# Patient Record
Sex: MALE | Race: WHITE | Employment: OTHER | URBAN - NONMETROPOLITAN AREA
[De-identification: names, ages, dates, MRNs, and addresses within clinical notes are randomized per-mention and may not be internally consistent; named-entity substitution may affect disease eponyms.]

---

## 2018-01-28 ENCOUNTER — NURSE TRIAGE (OUTPATIENT)
Dept: ADMINISTRATIVE | Age: 83
End: 2018-01-28

## 2018-01-28 NOTE — TELEPHONE ENCOUNTER
Reason for Disposition   [1] Fever AND [2] no signs of serious infection or localizing symptoms (all other triage questions negative)     Taking antibiotics for UTI. Answer Assessment - Initial Assessment Questions  1. TEMPERATURE: \"What is the most recent temperature? \"  \"How was it measured? \"       100.0  2. ONSET: \"When did the fever start? \"       Today   3. SYMPTOMS: \"Do you have any other symptoms besides the fever? \"  (e.g., colds, headache, sore throat, earache, cough, rash, diarrhea, vomiting, abdominal pain)      Cough and Dx. With UTI. 4. CAUSE: If there are no symptoms, ask: \"What do you think is causing the fever? \"      UTI   5. CONTACTS: \"Does anyone else in the family have an infection? \"      No   6. TREATMENT: \"What have you done so far to treat this fever? \" (e.g., medications)      Rx. Cephalexin. 7. IMMUNOCOMPROMISE: \"Do you have of the following: diabetes, HIV positive, splenectomy, cancer chemotherapy, chronic steroid treatment, transplant patient, etc.\"      No.   8. PREGNANCY: \"Is there any chance you are pregnant? \" \"When was your last menstrual period? \"      Male   9. TRAVEL: \"Have you traveled out of the country in the last month? \" (e.g., travel history, exposures)      No.    Protocols used:  [facility]

## 2019-05-29 ENCOUNTER — HOSPITAL ENCOUNTER (INPATIENT)
Age: 84
LOS: 9 days | Discharge: HOME | DRG: 472 | End: 2019-06-07
Attending: HOSPITALIST | Admitting: HOSPITALIST
Payer: MEDICARE

## 2019-05-29 DIAGNOSIS — Z00.6 ENCOUNTER FOR EXAMINATION FOR NORMAL COMPARISON AND CONTROL IN CLINICAL RESEARCH PROGRAM: ICD-10-CM

## 2019-05-29 PROBLEM — M46.22 ACUTE OSTEOMYELITIS OF CERVICAL SPINE (HCC): Status: ACTIVE | Noted: 2019-05-29

## 2019-05-29 LAB
ALBUMIN SERPL-MCNC: 3.4 G/DL (ref 3.5–5.1)
ALP BLD-CCNC: 84 U/L (ref 38–126)
ALT SERPL-CCNC: 31 U/L (ref 11–66)
ANION GAP SERPL CALCULATED.3IONS-SCNC: 12 MEQ/L (ref 8–16)
AST SERPL-CCNC: 24 U/L (ref 5–40)
BASOPHILS # BLD: 0.3 %
BASOPHILS ABSOLUTE: 0 THOU/MM3 (ref 0–0.1)
BILIRUB SERPL-MCNC: 0.4 MG/DL (ref 0.3–1.2)
BUN BLDV-MCNC: 24 MG/DL (ref 7–22)
C-REACTIVE PROTEIN: 0.69 MG/DL (ref 0–1)
C-REACTIVE PROTEIN: 0.79 MG/DL (ref 0–1)
CALCIUM SERPL-MCNC: 8.7 MG/DL (ref 8.5–10.5)
CHLORIDE BLD-SCNC: 104 MEQ/L (ref 98–111)
CO2: 23 MEQ/L (ref 23–33)
CREAT SERPL-MCNC: 1.4 MG/DL (ref 0.4–1.2)
EOSINOPHIL # BLD: 1.5 %
EOSINOPHILS ABSOLUTE: 0.2 THOU/MM3 (ref 0–0.4)
ERYTHROCYTE [DISTWIDTH] IN BLOOD BY AUTOMATED COUNT: 12.9 % (ref 11.5–14.5)
ERYTHROCYTE [DISTWIDTH] IN BLOOD BY AUTOMATED COUNT: 46.9 FL (ref 35–45)
GFR SERPL CREATININE-BSD FRML MDRD: 48 ML/MIN/1.73M2
GLUCOSE BLD-MCNC: 157 MG/DL (ref 70–108)
HCT VFR BLD CALC: 40 % (ref 42–52)
HEMOGLOBIN: 13.1 GM/DL (ref 14–18)
IMMATURE GRANS (ABS): 0.07 THOU/MM3 (ref 0–0.07)
IMMATURE GRANULOCYTES: 0.6 %
LYMPHOCYTES # BLD: 19.5 %
LYMPHOCYTES ABSOLUTE: 2.4 THOU/MM3 (ref 1–4.8)
MCH RBC QN AUTO: 32.8 PG (ref 26–33)
MCHC RBC AUTO-ENTMCNC: 32.8 GM/DL (ref 32.2–35.5)
MCV RBC AUTO: 100 FL (ref 80–94)
MONOCYTES # BLD: 9.3 %
MONOCYTES ABSOLUTE: 1.2 THOU/MM3 (ref 0.4–1.3)
NUCLEATED RED BLOOD CELLS: 0 /100 WBC
PLATELET # BLD: 212 THOU/MM3 (ref 130–400)
PMV BLD AUTO: 9.2 FL (ref 9.4–12.4)
POTASSIUM REFLEX MAGNESIUM: 4.9 MEQ/L (ref 3.5–5.2)
RBC # BLD: 4 MILL/MM3 (ref 4.7–6.1)
SEDIMENTATION RATE, ERYTHROCYTE: 27 MM/HR (ref 0–10)
SEG NEUTROPHILS: 68.8 %
SEGMENTED NEUTROPHILS ABSOLUTE COUNT: 8.5 THOU/MM3 (ref 1.8–7.7)
SODIUM BLD-SCNC: 139 MEQ/L (ref 135–145)
TOTAL PROTEIN: 6.6 G/DL (ref 6.1–8)
WBC # BLD: 12.4 THOU/MM3 (ref 4.8–10.8)

## 2019-05-29 PROCEDURE — 36415 COLL VENOUS BLD VENIPUNCTURE: CPT

## 2019-05-29 PROCEDURE — 1200000000 HC SEMI PRIVATE

## 2019-05-29 PROCEDURE — 86140 C-REACTIVE PROTEIN: CPT

## 2019-05-29 PROCEDURE — 85651 RBC SED RATE NONAUTOMATED: CPT

## 2019-05-29 PROCEDURE — 6370000000 HC RX 637 (ALT 250 FOR IP): Performed by: HOSPITALIST

## 2019-05-29 PROCEDURE — 80053 COMPREHEN METABOLIC PANEL: CPT

## 2019-05-29 PROCEDURE — 85025 COMPLETE CBC W/AUTO DIFF WBC: CPT

## 2019-05-29 PROCEDURE — 99222 1ST HOSP IP/OBS MODERATE 55: CPT | Performed by: NEUROLOGICAL SURGERY

## 2019-05-29 PROCEDURE — 6360000002 HC RX W HCPCS: Performed by: HOSPITALIST

## 2019-05-29 PROCEDURE — 99223 1ST HOSP IP/OBS HIGH 75: CPT | Performed by: HOSPITALIST

## 2019-05-29 PROCEDURE — 2580000003 HC RX 258: Performed by: HOSPITALIST

## 2019-05-29 PROCEDURE — 2709999900 HC NON-CHARGEABLE SUPPLY

## 2019-05-29 PROCEDURE — 87040 BLOOD CULTURE FOR BACTERIA: CPT

## 2019-05-29 RX ORDER — PANTOPRAZOLE SODIUM 40 MG/1
40 TABLET, DELAYED RELEASE ORAL
Status: DISCONTINUED | OUTPATIENT
Start: 2019-05-30 | End: 2019-06-07 | Stop reason: HOSPADM

## 2019-05-29 RX ORDER — LEVOTHYROXINE SODIUM 0.12 MG/1
125 TABLET ORAL DAILY
Status: DISCONTINUED | OUTPATIENT
Start: 2019-05-30 | End: 2019-06-07 | Stop reason: HOSPADM

## 2019-05-29 RX ORDER — TRIMETHOPRIM 100 MG/1
100 TABLET ORAL DAILY
COMMUNITY

## 2019-05-29 RX ORDER — ISOSORBIDE MONONITRATE 60 MG/1
60 TABLET, EXTENDED RELEASE ORAL DAILY
Status: DISCONTINUED | OUTPATIENT
Start: 2019-05-30 | End: 2019-06-07 | Stop reason: HOSPADM

## 2019-05-29 RX ORDER — SODIUM CHLORIDE 0.9 % (FLUSH) 0.9 %
10 SYRINGE (ML) INJECTION PRN
Status: DISCONTINUED | OUTPATIENT
Start: 2019-05-29 | End: 2019-06-05

## 2019-05-29 RX ORDER — LEVOTHYROXINE SODIUM 0.12 MG/1
125 TABLET ORAL DAILY
COMMUNITY

## 2019-05-29 RX ORDER — ACETAMINOPHEN 325 MG/1
650 TABLET ORAL EVERY 6 HOURS PRN
Status: DISCONTINUED | OUTPATIENT
Start: 2019-05-29 | End: 2019-06-05

## 2019-05-29 RX ORDER — SODIUM CHLORIDE 9 MG/ML
INJECTION, SOLUTION INTRAVENOUS CONTINUOUS
Status: DISCONTINUED | OUTPATIENT
Start: 2019-05-29 | End: 2019-06-03

## 2019-05-29 RX ORDER — AMLODIPINE BESYLATE 5 MG/1
5 TABLET ORAL 2 TIMES DAILY
Status: DISCONTINUED | OUTPATIENT
Start: 2019-05-29 | End: 2019-06-07 | Stop reason: HOSPADM

## 2019-05-29 RX ORDER — ATORVASTATIN CALCIUM 40 MG/1
40 TABLET, FILM COATED ORAL NIGHTLY
Status: DISCONTINUED | OUTPATIENT
Start: 2019-05-29 | End: 2019-06-07 | Stop reason: HOSPADM

## 2019-05-29 RX ORDER — SENNA PLUS 8.6 MG/1
1 TABLET ORAL DAILY PRN
Status: DISCONTINUED | OUTPATIENT
Start: 2019-05-29 | End: 2019-06-05

## 2019-05-29 RX ORDER — CLOPIDOGREL BISULFATE 75 MG/1
75 TABLET ORAL DAILY
Status: DISCONTINUED | OUTPATIENT
Start: 2019-05-30 | End: 2019-05-29

## 2019-05-29 RX ORDER — NITROGLYCERIN 0.4 MG/1
0.4 TABLET SUBLINGUAL EVERY 5 MIN PRN
Status: DISCONTINUED | OUTPATIENT
Start: 2019-05-29 | End: 2019-06-07 | Stop reason: HOSPADM

## 2019-05-29 RX ORDER — ATORVASTATIN CALCIUM 40 MG/1
40 TABLET, FILM COATED ORAL NIGHTLY
COMMUNITY

## 2019-05-29 RX ORDER — POTASSIUM CHLORIDE 7.45 MG/ML
10 INJECTION INTRAVENOUS PRN
Status: DISCONTINUED | OUTPATIENT
Start: 2019-05-29 | End: 2019-06-05

## 2019-05-29 RX ORDER — ASPIRIN 81 MG/1
81 TABLET ORAL DAILY
Status: DISCONTINUED | OUTPATIENT
Start: 2019-05-30 | End: 2019-06-07 | Stop reason: HOSPADM

## 2019-05-29 RX ORDER — TRIMETHOPRIM 100 MG/1
100 TABLET ORAL DAILY
Status: DISCONTINUED | OUTPATIENT
Start: 2019-05-30 | End: 2019-06-07 | Stop reason: HOSPADM

## 2019-05-29 RX ORDER — ONDANSETRON 2 MG/ML
4 INJECTION INTRAMUSCULAR; INTRAVENOUS EVERY 6 HOURS PRN
Status: DISCONTINUED | OUTPATIENT
Start: 2019-05-29 | End: 2019-06-05

## 2019-05-29 RX ORDER — PANTOPRAZOLE SODIUM 40 MG/1
40 GRANULE, DELAYED RELEASE ORAL
COMMUNITY

## 2019-05-29 RX ORDER — RANOLAZINE 500 MG/1
500 TABLET, EXTENDED RELEASE ORAL 2 TIMES DAILY
COMMUNITY

## 2019-05-29 RX ORDER — DOCUSATE SODIUM 100 MG/1
100 CAPSULE, LIQUID FILLED ORAL 2 TIMES DAILY PRN
Status: DISCONTINUED | OUTPATIENT
Start: 2019-05-29 | End: 2019-06-07 | Stop reason: HOSPADM

## 2019-05-29 RX ORDER — SODIUM CHLORIDE 0.9 % (FLUSH) 0.9 %
10 SYRINGE (ML) INJECTION EVERY 12 HOURS SCHEDULED
Status: DISCONTINUED | OUTPATIENT
Start: 2019-05-29 | End: 2019-06-05

## 2019-05-29 RX ORDER — ISOSORBIDE MONONITRATE 60 MG/1
60 TABLET, EXTENDED RELEASE ORAL DAILY
COMMUNITY

## 2019-05-29 RX ORDER — POTASSIUM CHLORIDE 20 MEQ/1
40 TABLET, EXTENDED RELEASE ORAL PRN
Status: DISCONTINUED | OUTPATIENT
Start: 2019-05-29 | End: 2019-06-05

## 2019-05-29 RX ORDER — RANOLAZINE 500 MG/1
500 TABLET, EXTENDED RELEASE ORAL 2 TIMES DAILY
Status: DISCONTINUED | OUTPATIENT
Start: 2019-05-29 | End: 2019-06-07 | Stop reason: HOSPADM

## 2019-05-29 RX ADMIN — ATORVASTATIN CALCIUM 40 MG: 40 TABLET, FILM COATED ORAL at 20:30

## 2019-05-29 RX ADMIN — CEFTRIAXONE SODIUM 1 G: 1 INJECTION, POWDER, FOR SOLUTION INTRAMUSCULAR; INTRAVENOUS at 12:41

## 2019-05-29 RX ADMIN — DOCUSATE SODIUM 100 MG: 100 CAPSULE, LIQUID FILLED ORAL at 20:39

## 2019-05-29 RX ADMIN — METOPROLOL TARTRATE 25 MG: 25 TABLET ORAL at 20:30

## 2019-05-29 RX ADMIN — SODIUM CHLORIDE: 9 INJECTION, SOLUTION INTRAVENOUS at 12:27

## 2019-05-29 RX ADMIN — ACETAMINOPHEN 650 MG: 325 TABLET ORAL at 12:24

## 2019-05-29 RX ADMIN — ENOXAPARIN SODIUM 40 MG: 40 INJECTION SUBCUTANEOUS at 20:32

## 2019-05-29 RX ADMIN — AMLODIPINE BESYLATE 5 MG: 5 TABLET ORAL at 20:30

## 2019-05-29 RX ADMIN — RANOLAZINE 500 MG: 500 TABLET, FILM COATED, EXTENDED RELEASE ORAL at 20:30

## 2019-05-29 RX ADMIN — ACETAMINOPHEN 650 MG: 325 TABLET ORAL at 17:43

## 2019-05-29 ASSESSMENT — PAIN DESCRIPTION - ORIENTATION: ORIENTATION: INNER;MID;POSTERIOR

## 2019-05-29 ASSESSMENT — PAIN DESCRIPTION - FREQUENCY: FREQUENCY: INTERMITTENT

## 2019-05-29 ASSESSMENT — PAIN DESCRIPTION - ONSET: ONSET: ON-GOING

## 2019-05-29 ASSESSMENT — PAIN DESCRIPTION - LOCATION: LOCATION: BACK;NECK

## 2019-05-29 ASSESSMENT — PAIN DESCRIPTION - DESCRIPTORS: DESCRIPTORS: SHOOTING;SHARP

## 2019-05-29 ASSESSMENT — PAIN SCALES - GENERAL
PAINLEVEL_OUTOF10: 4
PAINLEVEL_OUTOF10: 5
PAINLEVEL_OUTOF10: 5
PAINLEVEL_OUTOF10: 4

## 2019-05-29 ASSESSMENT — PAIN DESCRIPTION - PAIN TYPE: TYPE: ACUTE PAIN

## 2019-05-29 NOTE — H&P
Yes Historical Provider, MD   isosorbide mononitrate (IMDUR) 60 MG extended release tablet Take 60 mg by mouth daily   Yes Historical Provider, MD   trimethoprim (TRIMPEX) 100 MG tablet Take 100 mg by mouth daily   Yes Historical Provider, MD   atorvastatin (LIPITOR) 40 MG tablet Take 40 mg by mouth nightly   Yes Historical Provider, MD   ranolazine (RANEXA) 500 MG extended release tablet Take 500 mg by mouth 2 times daily   Yes Historical Provider, MD   pantoprazole sodium (PROTONIX) 40 MG PACK packet Take 40 mg by mouth every morning (before breakfast)   Yes Historical Provider, MD   clopidogrel (PLAVIX) 75 MG tablet Take 75 mg by mouth daily. Yes Historical Provider, MD   metoprolol (LOPRESSOR) 50 MG tablet Take 25 mg by mouth 2 times daily. Yes Historical Provider, MD   aspirin EC 81 MG EC tablet Take 81 mg by mouth daily. Yes Historical Provider, MD   amlodipine (NORVASC) 10 MG tablet Take 5 mg by mouth 2 times daily. Yes Historical Provider, MD   acetaminophen (TYLENOL) 500 MG tablet Take 500 mg by mouth every 6 hours as needed. Yes Historical Provider, MD   docusate sodium (COLACE) 100 MG capsule Take 100 mg by mouth 2 times daily as needed    Yes Historical Provider, MD   nitroGLYCERIN (NITROSTAT) 0.4 MG SL tablet Place 0.4 mg under the tongue every 5 minutes as needed. Historical Provider, MD       Allergies:  Lopid [gemfibrozil]; Zocor [simvastatin]; Flomax [tamsulosin hcl]; Levaquin [levofloxacin]; and Amoxicillin-pot clavulanate    Social History:      The patient currently lives at home    TOBACCO:   reports that he has quit smoking. He quit smokeless tobacco use about 52 years ago. ETOH:   reports that he drinks alcohol.       Family History:      Reviewed in detail and Positive as follows:        Problem Relation Age of Onset    Diabetes Mother     Stroke Mother     Heart Disease Father        Diet:  DIET GENERAL; No Added Salt (3-4 GM)    REVIEW OF SYSTEMS:   Pertinent positives as noted in the HPI. All other systems reviewed and negative. PHYSICAL EXAM:    /62   Pulse 66   Temp 97.8 °F (36.6 °C) (Oral)   Resp 16   Ht 5' 8\" (1.727 m)   Wt 179 lb 2 oz (81.3 kg)   SpO2 94%   BMI 27.24 kg/m²     General appearance:  No apparent distress, appears stated age and cooperative. HEENT:  Normal cephalic, atraumatic without obvious deformity. Pupils equal, round, and reactive to light. Extra ocular muscles intact. Conjunctivae/corneas clear. Neck: No jugular venous distention. Trachea midline. Respiratory:  Normal respiratory effort. Clear to auscultation, bilaterally without Rales/Wheezes/Rhonchi. Cardiovascular:  Regular rate and rhythm with normal S1/S2 without murmurs, rubs or gallops. Abdomen: Soft, non-tender, non-distended with normal bowel sounds. Musculoskeletal:  No clubbing, cyanosis or edema bilaterally. Full range of motion without deformity. Skin: Skin color, texture, turgor normal.  No rashes or lesions. Neurologic:  Neurovascularly intact without any focal sensory/motor deficits. Cranial nerves: II-XII intact, grossly non-focal.  Psychiatric:  Alert and oriented, thought content appropriate, normal insight  Capillary Refill: Brisk,< 3 seconds   Peripheral Pulses: +2 palpable, equal bilaterally       Labs:     Recent Labs     05/29/19  1154   WBC 12.4*   HGB 13.1*   HCT 40.0*        Recent Labs     05/29/19  1154      K 4.9      CO2 23   BUN 24*   CREATININE 1.4*   CALCIUM 8.7     Recent Labs     05/29/19  1154   AST 24   ALT 31   BILITOT 0.4   ALKPHOS 84     No results for input(s): INR in the last 72 hours. No results for input(s): [place] Audrey in the last 72 hours.     Urinalysis:      Lab Results   Component Value Date    NITRU NEGATIVE 11/22/2011    WBCUA 15-25 11/22/2011    BACTERIA NONE 11/22/2011    RBCUA 0-2 11/22/2011    BLOODU NEGATIVE 11/22/2011    GLUCOSEU NEGATIVE 11/22/2011       Intake & Output:  No intake/output data recorded. No intake/output data recorded. Radiology:       No orders to display        DVT prophylaxis: Lovenox    Code Status: Full Code      Disposition:Home    Active Hospital Problems    Diagnosis Date Noted    [facility] [M46.22] 05/29/2019     Priority: High    Hyperlipidemia [E78.5]     Hypertension [I10]     CAD (coronary artery disease) [I25.10]     Personal history of DVT (deep vein thrombosis) [Z86.718]        PLAN:    1. Acute osteomyelitis of cervical spine - On Vancomycin and Rocephin empirically. Consult ID. Blood culture and CRP ordered. MRI report not available at this time. 2. HTN, HLD, CAD - stable. Continue home medications. Thank you Sophai Hassan for the opportunity to be involved in this patient's care.     Electronically signed by Frankey Fee, DO on 5/29/2019 at 1:47 PM

## 2019-05-29 NOTE — PROGRESS NOTES
Pharmacy Note  Vancomycin Consult    Fer Payment is a 81 y.o. male started on Vancomycin for osteomyelitis of cervical spine; consult received from Dr. Nehal Martinez to manage therapy. Also receiving the following antibiotics: ceftriaxone. Patient Active Problem List   Diagnosis    CAD (coronary artery disease)    Personal history of DVT (deep vein thrombosis)    Hyperlipidemia    Hypertension    Stented coronary artery, last 2004    Transverse myelitis, 1997    Pulmonary embolism (HCC)    Acute osteomyelitis of cervical spine (HCC)       Allergies:  Lopid [gemfibrozil]; Zocor [simvastatin]; Flomax [tamsulosin hcl]; Levaquin [levofloxacin]; and Amoxicillin-pot clavulanate     Temp max: 97.8    Recent Labs     05/29/19  1154   BUN 24*       Recent Labs     05/29/19  1154   CREATININE 1.4*       Recent Labs     05/29/19  1154   WBC 12.4*       No intake or output data in the 24 hours ending 05/29/19 1244    Culture Date Source Results   none                            Ht Readings from Last 1 Encounters:   05/29/19 5' 8\" (1.727 m)        Wt Readings from Last 1 Encounters:   05/29/19 179 lb 2 oz (81.3 kg)         Body mass index is 27.24 kg/m². Estimated Creatinine Clearance: 39 mL/min (A) (based on SCr of 1.4 mg/dL (H)). Goal Trough Level: 15 to 20 mcg/mL    Assessment/Plan:  Will initiate vancomycin 1250mg IV every 24 hours. Timing of trough level will be determined based on culture results, renal function, and clinical response. Thank you for the consult. Will continue to follow.      Tory Alvarado, PharmD, BCPS 5/29/2019 12:49 PM

## 2019-05-29 NOTE — CONSULTS
[address] [id number]                                  CONSULTATION    PATIENT NAME: Amaya Sheridan                      :        1935  MED REC NO:   [id number]                           ROOM:       0014  ACCOUNT NO:   [de-identified]                           ADMIT DATE: 2019  PROVIDER:     Jammie Tenorio. Tanner Hoffman M.D.    Barbsara Yen:  2019    HISTORY OF PRESENT ILLNESS:  This is an 81-year-old male patient who was  admitted to the hospital due to neck pain for the last three weeks. The  patient had a previous history of neck surgery 40 years ago, and for the  last three weeks, he has been having neck pain, radiating to his upper  extremities. This has been going on for the last three weeks. The  patient had an MRI of his neck yesterday and there was a finding  suspicious for like discitis, for which reason, he was advised to come  to the hospital.    His MRI done on 2019 reported that there is abnormal edema within  the paraspinal soft tissue and prevertebral soft tissue, just ventral to  the C6 and C7 vertebral bodies on the T1 sequence. He has degenerative  disk disease with loss of disk space, abnormal edema identified on the  endplate of C6 to C7. There was a question, small amount of epidural  phlegmon versus prominence of the posterior longitudinal ligament, and  the impression was severe endplate edema with paraspinal and  prevertebral edema at C6-C7. Findings could be seen in the setting of  discitis or osteomyelitis or neurogenic Charcot changes. The patient denies any fever or chills. He has no any history of trauma  or recent intervention. His neck surgery was almost 40 years ago. He  had a spinal fusion in  and , and he had lower back surgeries  two in , one in , , and . He was empirically started on  antibiotics. He denies any weakness on his extremities.   No fever or  chills. PAST MEDICAL HISTORY:  Include coronary artery disease, basal skin  cancer, hyperlipidemia, hypertension, history of DVT, coronary artery  disease, degenerative back disease, history of transverse myelitis in  1997, blood clot in 1997, basal cell cancer 2002. PAST SURGICAL HISTORY:  Include appendix surgery 1938, tonsils removed  in 1942, finger surgery in 1958, spinal fusion as noted above on his  neck 1976 and 1977, angioplasty, heart catheterization, the last one was  in 2017; stents in 2002, right hip replaced in 2011, Zenker's  diverticulum 2012 and 2017, three heart bypass, six heart bypass in  2012, shoulder replaced in 2018. MEDICATIONS:  Include cholecalciferol, amlodipine, aspirin, isosorbide  mononitrate, levothyroxine, Colace, metoprolol, Lipitor, Plavix,  pantoprazole, and nitroglycerin. ALLERGIES:  She has allergies to LOPID, ZOCOR, FLOMAX, LEVAQUIN, and  AUGMENTIN. REVIEW OF SYSTEMS:  As noted above. PHYSICAL EXAMINATION:  VITAL SIGNS:  Temperature is 97.8, respirations 16, pulse 66, blood  pressure 117/62. HEENT:  He has slightly pale conjunctivae. Anicteric sclerae. CHEST:  Bilateral air entry. CARDIOVASCULAR SYSTEM:  Regular. ABDOMEN:  Soft. EXTREMITIES:  No cyanosis or clubbing. CNS:  He is conscious, oriented to person, place, and time. No weakness  was noted on CNS. Reflexes were normal.    DIAGNOSTICS:  Sodium of 139, potassium 4.9, chloride 104, bicarb 23, BUN  24, creatinine 1.4. ALT of 31, AST 24. WBC 12.4, hemoglobin 13.1,  hematocrit 40, and platelets of 086. Blood culture, preliminary was  negative. IMPRESSION:  He is an 56-year-old male patient who presented with a  three-week history of neck pain, radiating into his arms. He had a  previous history of fusion on his neck. He has an abnormal MRI with  findings as noted above. RECOMMENDATIONS:  We will check his ESR, C-reactive protein.   At this  time, I do not see any neurologic deficit or obvious abscess to warrant  IV antibiotics. We will ask Interventional Radiology to aspirate the  area for gram stain and culture. We will consult orthopedic surgeon to  evaluate the patient. Based on findings, further recommendations will  follow.         Jose Nash M.D.    D: 05/29/2019 14:46:02       T: 05/29/2019 15:57:23     [email or web address]  Job#: [id number]     Doc#: [id number]    CC:

## 2019-05-30 ENCOUNTER — APPOINTMENT (OUTPATIENT)
Dept: MRI IMAGING | Age: 84
DRG: 472 | End: 2019-05-30
Attending: HOSPITALIST
Payer: MEDICARE

## 2019-05-30 LAB
ALBUMIN SERPL-MCNC: 3.5 G/DL (ref 3.5–5.1)
ALP BLD-CCNC: 79 U/L (ref 38–126)
ALT SERPL-CCNC: 32 U/L (ref 11–66)
ANION GAP SERPL CALCULATED.3IONS-SCNC: 13 MEQ/L (ref 8–16)
AST SERPL-CCNC: 22 U/L (ref 5–40)
BILIRUB SERPL-MCNC: 0.5 MG/DL (ref 0.3–1.2)
BUN BLDV-MCNC: 21 MG/DL (ref 7–22)
CALCIUM SERPL-MCNC: 8.8 MG/DL (ref 8.5–10.5)
CHLORIDE BLD-SCNC: 107 MEQ/L (ref 98–111)
CO2: 23 MEQ/L (ref 23–33)
CREAT SERPL-MCNC: 1.2 MG/DL (ref 0.4–1.2)
ERYTHROCYTE [DISTWIDTH] IN BLOOD BY AUTOMATED COUNT: 12.8 % (ref 11.5–14.5)
ERYTHROCYTE [DISTWIDTH] IN BLOOD BY AUTOMATED COUNT: 45.9 FL (ref 35–45)
GFR SERPL CREATININE-BSD FRML MDRD: 58 ML/MIN/1.73M2
GLUCOSE BLD-MCNC: 126 MG/DL (ref 70–108)
HCT VFR BLD CALC: 39.7 % (ref 42–52)
HEMOGLOBIN: 12.8 GM/DL (ref 14–18)
MCH RBC QN AUTO: 31.9 PG (ref 26–33)
MCHC RBC AUTO-ENTMCNC: 32.2 GM/DL (ref 32.2–35.5)
MCV RBC AUTO: 99 FL (ref 80–94)
PLATELET # BLD: 208 THOU/MM3 (ref 130–400)
PMV BLD AUTO: 9.2 FL (ref 9.4–12.4)
POTASSIUM REFLEX MAGNESIUM: 5.1 MEQ/L (ref 3.5–5.2)
RBC # BLD: 4.01 MILL/MM3 (ref 4.7–6.1)
SODIUM BLD-SCNC: 143 MEQ/L (ref 135–145)
TOTAL PROTEIN: 6.5 G/DL (ref 6.1–8)
WBC # BLD: 7.5 THOU/MM3 (ref 4.8–10.8)

## 2019-05-30 PROCEDURE — 6370000000 HC RX 637 (ALT 250 FOR IP): Performed by: HOSPITALIST

## 2019-05-30 PROCEDURE — 2709999900 HC NON-CHARGEABLE SUPPLY

## 2019-05-30 PROCEDURE — 85027 COMPLETE CBC AUTOMATED: CPT

## 2019-05-30 PROCEDURE — 3209999900 MRI COMPARISON OF OUTSIDE FILMS

## 2019-05-30 PROCEDURE — 1200000000 HC SEMI PRIVATE

## 2019-05-30 PROCEDURE — 36415 COLL VENOUS BLD VENIPUNCTURE: CPT

## 2019-05-30 PROCEDURE — 6360000004 HC RX CONTRAST MEDICATION: Performed by: INTERNAL MEDICINE

## 2019-05-30 PROCEDURE — A9579 GAD-BASE MR CONTRAST NOS,1ML: HCPCS | Performed by: INTERNAL MEDICINE

## 2019-05-30 PROCEDURE — 72156 MRI NECK SPINE W/O & W/DYE: CPT

## 2019-05-30 PROCEDURE — 80053 COMPREHEN METABOLIC PANEL: CPT

## 2019-05-30 PROCEDURE — 99232 SBSQ HOSP IP/OBS MODERATE 35: CPT | Performed by: INTERNAL MEDICINE

## 2019-05-30 PROCEDURE — 6360000002 HC RX W HCPCS: Performed by: INTERNAL MEDICINE

## 2019-05-30 RX ORDER — LORAZEPAM 2 MG/ML
1 INJECTION INTRAMUSCULAR ONCE
Status: COMPLETED | OUTPATIENT
Start: 2019-05-30 | End: 2019-05-30

## 2019-05-30 RX ADMIN — ACETAMINOPHEN 650 MG: 325 TABLET ORAL at 01:15

## 2019-05-30 RX ADMIN — RANOLAZINE 500 MG: 500 TABLET, FILM COATED, EXTENDED RELEASE ORAL at 10:00

## 2019-05-30 RX ADMIN — DOCUSATE SODIUM 100 MG: 100 CAPSULE, LIQUID FILLED ORAL at 20:10

## 2019-05-30 RX ADMIN — LORAZEPAM 1 MG: 2 INJECTION INTRAMUSCULAR; INTRAVENOUS at 16:13

## 2019-05-30 RX ADMIN — GADOTERIDOL 15 ML: 279.3 INJECTION, SOLUTION INTRAVENOUS at 17:40

## 2019-05-30 RX ADMIN — PANTOPRAZOLE SODIUM 40 MG: 40 TABLET, DELAYED RELEASE ORAL at 05:43

## 2019-05-30 RX ADMIN — METOPROLOL TARTRATE 25 MG: 25 TABLET ORAL at 10:00

## 2019-05-30 RX ADMIN — AMLODIPINE BESYLATE 5 MG: 5 TABLET ORAL at 20:10

## 2019-05-30 RX ADMIN — ACETAMINOPHEN 650 MG: 325 TABLET ORAL at 08:16

## 2019-05-30 RX ADMIN — RANOLAZINE 500 MG: 500 TABLET, FILM COATED, EXTENDED RELEASE ORAL at 20:10

## 2019-05-30 RX ADMIN — ACETAMINOPHEN 650 MG: 325 TABLET ORAL at 14:23

## 2019-05-30 RX ADMIN — AMLODIPINE BESYLATE 5 MG: 5 TABLET ORAL at 10:00

## 2019-05-30 RX ADMIN — VITAMIN D, TAB 1000IU (100/BT) 1000 UNITS: 25 TAB at 10:00

## 2019-05-30 RX ADMIN — METOPROLOL TARTRATE 25 MG: 25 TABLET ORAL at 20:10

## 2019-05-30 RX ADMIN — ISOSORBIDE MONONITRATE 60 MG: 60 TABLET ORAL at 10:00

## 2019-05-30 RX ADMIN — ATORVASTATIN CALCIUM 40 MG: 40 TABLET, FILM COATED ORAL at 20:10

## 2019-05-30 RX ADMIN — LEVOTHYROXINE SODIUM 125 MCG: 125 TABLET ORAL at 05:43

## 2019-05-30 ASSESSMENT — PAIN SCALES - GENERAL
PAINLEVEL_OUTOF10: 7
PAINLEVEL_OUTOF10: 3
PAINLEVEL_OUTOF10: 6
PAINLEVEL_OUTOF10: 5
PAINLEVEL_OUTOF10: 3
PAINLEVEL_OUTOF10: 5

## 2019-05-30 ASSESSMENT — PAIN DESCRIPTION - LOCATION: LOCATION: BACK;NECK

## 2019-05-30 ASSESSMENT — PAIN DESCRIPTION - FREQUENCY: FREQUENCY: INTERMITTENT

## 2019-05-30 ASSESSMENT — PAIN DESCRIPTION - DESCRIPTORS: DESCRIPTORS: SHOOTING;SHARP

## 2019-05-30 ASSESSMENT — PAIN DESCRIPTION - PAIN TYPE: TYPE: ACUTE PAIN

## 2019-05-30 ASSESSMENT — PAIN DESCRIPTION - ORIENTATION: ORIENTATION: INNER;MID;POSTERIOR

## 2019-05-30 ASSESSMENT — PAIN DESCRIPTION - ONSET: ONSET: ON-GOING

## 2019-05-30 NOTE — PROGRESS NOTES
Hospitalist Progress Note    Patient:  Mary Walsh      Unit/Bed:5K-14/014-A    YOB: 1935    MRN: [id number]       Acct: [de-identified]     PCP: Cooper Campbell    Date of Admission: 5/29/2019    Chief Complaint: neck pain     Hospital Course: 81 y.o. male was sent to [address] [place] [address] by his PCP with MRI result of cervical osteomyelitis. Patient was having neck pain and arm pain for past 3 weeks without fever or chills. Patient was getting multiple imaging studies and medical management but his symptoms persisted. MRI finally showed a lesion on cervical bone at the level of C6-7. Patient denies having any other symptom associated with infection other than pain. No recent illness. Denies IV drug abuse. Pt had previous neck surgery about 40 years ago. MRI done on 05/28/2019 reported that there is abnormal edema within the paraspinal soft tissue and prevertebral soft tissue, just ventral to the C6 and C7 vertebral bodies on the T1 sequence. He has degenerative disk disease with loss of disk space, abnormal edema identified on the endplate of C6 to C7. There was a question, small amount of epidural phlegmon versus prominence of the posterior longitudinal ligament, and the impression was severe endplate edema with paraspinal and prevertebral edema at C6-C7. Findings could be seen in the setting of discitis or osteomyelitis or neurogenic Charcot changes. Pt admitted for IR bone biopsy, ID and NSx on board. Subjective: no acute events overnight. Continues to have neck pain. No extremity weakness, numbness or burning.        Medications:  Reviewed    Infusion Medications    sodium chloride 75 mL/hr at 05/30/19 7073     Scheduled Medications    amLODIPine  5 mg Oral BID    [Held by provider] aspirin EC  81 mg Oral Daily    atorvastatin  40 mg Oral Nightly    isosorbide mononitrate  60 mg Oral Daily    levothyroxine  125 mcg Oral Daily    metoprolol tartrate  25 mg Oral BID  pantoprazole  40 mg Oral QAM AC    ranolazine  500 mg Oral BID    trimethoprim  100 mg Oral Daily    vitamin D  1,000 Units Oral Daily    sodium chloride flush  10 mL Intravenous 2 times per day    [Held by provider] enoxaparin  40 mg Subcutaneous Daily     PRN Meds: docusate sodium, nitroGLYCERIN, sodium chloride flush, ondansetron, potassium chloride **OR** potassium alternative oral replacement **OR** potassium chloride, magnesium sulfate, senna, acetaminophen      Intake/Output Summary (Last 24 hours) at 5/30/2019 1318  Last data filed at 5/30/2019 0519  Gross per 24 hour   Intake 1210.93 ml   Output 1875 ml   Net -664.07 ml       Diet:  DIET GENERAL; No Added Salt (3-4 GM)    Exam:  BP (!) 146/66   Pulse 64   Temp 98.7 °F (37.1 °C) (Oral)   Resp 18   Ht 5' 8\" (1.727 m)   Wt 179 lb 2 oz (81.3 kg)   SpO2 94%   BMI 27.24 kg/m²     General appearance: No apparent distress, appears stated age and cooperative. HEENT: Pupils equal, round, and reactive to light. Conjunctivae/corneas clear. Neck: neck pain. No jugular venous distention. Trachea midline. Respiratory:  Normal respiratory effort. Clear to auscultation, bilaterally without Rales/Wheezes/Rhonchi. Cardiovascular: Regular rate and rhythm with normal S1/S2 without murmurs, rubs or gallops. Abdomen: Soft, non-tender, non-distended with normal bowel sounds. Musculoskeletal: passive and active ROM x 4 extremities. Skin: Skin color, texture, turgor normal.  No rashes or lesions. Neurologic:  Neurovascularly intact without any focal sensory/motor deficits.  Cranial nerves: II-XII intact, grossly non-focal.  Psychiatric: Alert and oriented, thought content appropriate, normal insight  Capillary Refill: Brisk,< 3 seconds   Peripheral Pulses: +2 palpable, equal bilaterally       Labs:   Recent Labs     05/29/19  1154 05/30/19  0629   WBC 12.4* 7.5   HGB 13.1* 12.8*   HCT 40.0* 39.7*    208     Recent Labs     05/29/19  1154 05/30/19  0629  143   K 4.9 5.1    107   CO2 23 23   BUN 24* 21   CREATININE 1.4* 1.2   CALCIUM 8.7 8.8     Recent Labs     05/29/19  1154 05/30/19  0629   AST 24 22   ALT 31 32   BILITOT 0.4 0.5   ALKPHOS 84 79     No results for input(s): INR in the last 72 hours. No results for input(s): Zohreh Lacrosse in the last 72 hours. Urinalysis:      Lab Results   Component Value Date    NITRU NEGATIVE 11/22/2011    WBCUA 15-25 11/22/2011    BACTERIA NONE 11/22/2011    RBCUA 0-2 11/22/2011    BLOODU NEGATIVE 11/22/2011    GLUCOSEU NEGATIVE 11/22/2011       Radiology:  IR Interventional Radiology Procedure Request    (Results Pending)       Diet: DIET GENERAL; No Added Salt (3-4 GM)    DVT prophylaxis: [] Lovenox                                 [x] SCDs                                 [] SQ Heparin                                 [] Encourage ambulation           [] Already on Anticoagulation     Disposition:    [] Home       [] TCU       [] Rehab       [] Psych       [] SNF       [] [place]       [] Other-    Code Status: Full Code    PT/OT Eval Status: pending     Assessment/Plan:    Anticipated Discharge in : TBD     Active Hospital Problems    Diagnosis Date Noted    Acute osteomyelitis of cervical spine ([facility] [M46.22] 05/29/2019    Hyperlipidemia [E78.5]     Hypertension [I10]     CAD (coronary artery disease) [I25.10]     Personal history of DVT (deep vein thrombosis) [Z86.718]        Assessment/Plan:    1. Acute OM of Cervical Spine: no history of IV drug abuse. History of cervical neck surgery 40 years ago. Pending Cervical spin mri with contrast. pending IR guided aspirate for gram stain and culture. NSx on board. Hold Abx for now. 2. Essential HTN: cont Norvasc, IMDUR, and Lopressor. 3. Hyperlipidemia: cont Statin  4. CAD: hold ASA and Plavix. Cont BB and statin. 5. Hypothyroidism: cont synthroid 125 mcg daily   6.  Urinary Incontinence: pt self cath's         Electronically signed by Harper Gray MD on 5/30/2019 at 1:18 PM

## 2019-05-30 NOTE — PROGRESS NOTES
Message sent to Dr Gayla Holliday as he will need something to medicate him for the MRI because he is claustrophobic.

## 2019-05-30 NOTE — CARE COORDINATION
5/30/19, 8:55 AM      Cinthia Pablo       Admitted from: Direct admit from PCP's office due to results of MRI of cervical spine. 5/29/2019/ [address] day: 1   Location: [facility]014[facility] Reason for admit: Acute osteomyelitis of cervical spine ([facility] [M46.22] Status: Inpt. Admit order signed?: yes  PMH:  has a past medical history of CAD (coronary artery disease), Cancer ([facility] Hyperlipidemia, Hypertension, Personal history of DVT (deep vein thrombosis), and Transverse myelitis ([facility] Procedure: 5/30/19 planned biopsy of cervical lesion. Pertinent abnormal Imaging:No scans to report. [facility] cervical spine [facility].   Medications:  Scheduled Meds:   amLODIPine  5 mg Oral BID    aspirin EC  81 mg Oral Daily    atorvastatin  40 mg Oral Nightly    isosorbide mononitrate  60 mg Oral Daily    levothyroxine  125 mcg Oral Daily    metoprolol tartrate  25 mg Oral BID    pantoprazole  40 mg Oral QAM AC    ranolazine  500 mg Oral BID    trimethoprim  100 mg Oral Daily    vitamin D  1,000 Units Oral Daily    sodium chloride flush  10 mL Intravenous 2 times per day    enoxaparin  40 mg Subcutaneous Daily     Continuous Infusions:   sodium chloride 75 mL/hr at 05/30/19 7683      Pertinent Info/Orders/Treatment Plan:  Blood cultures pending. Consults to ID and Ortho, IV fluids, Aspirin and Lovenox on hold, prn Tylenol for pain, Potassium replacement protocol, aspiration of cervical lesion in radiology today, oxygen, up with assistance. Diet: DIET GENERAL; No Added Salt (3-4 GM)   Smoking status:  reports that he has quit smoking. He quit smokeless tobacco use about 52 years ago.    PCP: Benjamin Mckenzie  Readmission: No  Readmission Risk Score: 9%    Discharge Planning  Current Residence:  Private Residence  Living Arrangements:  Spouse/Significant Other   Support Systems:  Spouse/Significant Other, Children  Current Services PTA:     Potential Assistance Needed:  N/A  Potential Assistance Purchasing Medications:  No  Does patient want to participate in local refill/ meds to beds program?  No  Type of Home Care Services:  None  Patient expects to be discharged to:  home  Expected Discharge date:  05/31/19  Follow Up Appointment: Best Day/ Time:    Discharge Plan: Met with Lexii Watters. He is from home with his wife. He states he was still able to drive until two days ago when the pain in his neck became so severe he could not stand to drive. Lexii Watters states his wife is able to drive without any difficulties. He gets his medications [facility] the [facility]. Lexii Watters states he is indepenedent with his ADL's. He has a walker and wheelchair at home if needed. Lexii Watters denies the need for services or DME at discharge.     Evaluation: no

## 2019-05-30 NOTE — PROGRESS NOTES
Progress note: Infectious diseases    Patient - Monika Velasquez,  Age - 81 y.o.    - 1935      Room Number - 5K-14/014-A   MRN -  [id number]   Acct # - [de-identified]  Date of Admission -  2019 10:53 AM    SUBJECTIVE:   He has neck pain  Discussed with hospitalist.  OBJECTIVE   VITALS    height is 5' 8\" (1.727 m) and weight is 179 lb 2 oz (81.3 kg). His oral temperature is 97.7 °F (36.5 °C). His blood pressure is 109/60 and his pulse is 66. His respiration is 16 and oxygen saturation is 94%. Wt Readings from Last 3 Encounters:   19 179 lb 2 oz (81.3 kg)   11 173 lb (78.5 kg)       I/O (24 Hours)    Intake/Output Summary (Last 24 hours) at 2019 1619  Last data filed at 2019 1444  Gross per 24 hour   Intake 2096.3 ml   Output 2973 ml   Net -876.7 ml       General Appearance  Awake, alert, oriented,  not  In acute distress  HEENT - normocephalic, atraumatic, pink conjunctiva,  anicteric sclera  Neck - Supple, no mass  Lungs -  Bilateral good air entry, no rhonchi, no wheeze  Cardiovascular - Heart sounds are normal.  Regular rate and rhythm without murmur, gallop or rub.   Abdomen - soft, not distended, nontender,   Neurologic - oriented  Skin - No bruising or bleeding  Extremities - No edema, no cyanosis, clubbing     MEDICATIONS:      amLODIPine  5 mg Oral BID    [Held by provider] aspirin EC  81 mg Oral Daily    atorvastatin  40 mg Oral Nightly    isosorbide mononitrate  60 mg Oral Daily    levothyroxine  125 mcg Oral Daily    metoprolol tartrate  25 mg Oral BID    pantoprazole  40 mg Oral QAM AC    ranolazine  500 mg Oral BID    trimethoprim  100 mg Oral Daily    vitamin D  1,000 Units Oral Daily    sodium chloride flush  10 mL Intravenous 2 times per day    [Held by provider] enoxaparin  40 mg Subcutaneous Daily      sodium chloride 75 mL/hr at 19 0625     docusate sodium,

## 2019-05-31 LAB
ANION GAP SERPL CALCULATED.3IONS-SCNC: 13 MEQ/L (ref 8–16)
BASOPHILS # BLD: 0.5 %
BASOPHILS ABSOLUTE: 0 THOU/MM3 (ref 0–0.1)
BUN BLDV-MCNC: 17 MG/DL (ref 7–22)
CALCIUM SERPL-MCNC: 8.4 MG/DL (ref 8.5–10.5)
CHLORIDE BLD-SCNC: 104 MEQ/L (ref 98–111)
CO2: 20 MEQ/L (ref 23–33)
CREAT SERPL-MCNC: 1 MG/DL (ref 0.4–1.2)
EOSINOPHIL # BLD: 2.8 %
EOSINOPHILS ABSOLUTE: 0.2 THOU/MM3 (ref 0–0.4)
ERYTHROCYTE [DISTWIDTH] IN BLOOD BY AUTOMATED COUNT: 12.9 % (ref 11.5–14.5)
ERYTHROCYTE [DISTWIDTH] IN BLOOD BY AUTOMATED COUNT: 46.9 FL (ref 35–45)
GFR SERPL CREATININE-BSD FRML MDRD: 71 ML/MIN/1.73M2
GLUCOSE BLD-MCNC: 104 MG/DL (ref 70–108)
HCT VFR BLD CALC: 38.7 % (ref 42–52)
HEMOGLOBIN: 12.5 GM/DL (ref 14–18)
IMMATURE GRANS (ABS): 0.03 THOU/MM3 (ref 0–0.07)
IMMATURE GRANULOCYTES: 0.4 %
LYMPHOCYTES # BLD: 27.3 %
LYMPHOCYTES ABSOLUTE: 2.2 THOU/MM3 (ref 1–4.8)
MAGNESIUM: 1.9 MG/DL (ref 1.6–2.4)
MCH RBC QN AUTO: 32.2 PG (ref 26–33)
MCHC RBC AUTO-ENTMCNC: 32.3 GM/DL (ref 32.2–35.5)
MCV RBC AUTO: 99.7 FL (ref 80–94)
MONOCYTES # BLD: 12.5 %
MONOCYTES ABSOLUTE: 1 THOU/MM3 (ref 0.4–1.3)
NUCLEATED RED BLOOD CELLS: 0 /100 WBC
PLATELET # BLD: 200 THOU/MM3 (ref 130–400)
PMV BLD AUTO: 9.4 FL (ref 9.4–12.4)
POTASSIUM SERPL-SCNC: 4.2 MEQ/L (ref 3.5–5.2)
RBC # BLD: 3.88 MILL/MM3 (ref 4.7–6.1)
SEG NEUTROPHILS: 56.5 %
SEGMENTED NEUTROPHILS ABSOLUTE COUNT: 4.5 THOU/MM3 (ref 1.8–7.7)
SODIUM BLD-SCNC: 137 MEQ/L (ref 135–145)
WBC # BLD: 7.9 THOU/MM3 (ref 4.8–10.8)

## 2019-05-31 PROCEDURE — 85025 COMPLETE CBC W/AUTO DIFF WBC: CPT

## 2019-05-31 PROCEDURE — 80048 BASIC METABOLIC PNL TOTAL CA: CPT

## 2019-05-31 PROCEDURE — 99232 SBSQ HOSP IP/OBS MODERATE 35: CPT | Performed by: INTERNAL MEDICINE

## 2019-05-31 PROCEDURE — 36415 COLL VENOUS BLD VENIPUNCTURE: CPT

## 2019-05-31 PROCEDURE — 1200000000 HC SEMI PRIVATE

## 2019-05-31 PROCEDURE — 6370000000 HC RX 637 (ALT 250 FOR IP): Performed by: HOSPITALIST

## 2019-05-31 PROCEDURE — 83735 ASSAY OF MAGNESIUM: CPT

## 2019-05-31 PROCEDURE — 2709999900 HC NON-CHARGEABLE SUPPLY

## 2019-05-31 RX ADMIN — ACETAMINOPHEN 650 MG: 325 TABLET ORAL at 10:43

## 2019-05-31 RX ADMIN — ATORVASTATIN CALCIUM 40 MG: 40 TABLET, FILM COATED ORAL at 19:30

## 2019-05-31 RX ADMIN — RANOLAZINE 500 MG: 500 TABLET, FILM COATED, EXTENDED RELEASE ORAL at 11:21

## 2019-05-31 RX ADMIN — LEVOTHYROXINE SODIUM 125 MCG: 125 TABLET ORAL at 06:16

## 2019-05-31 RX ADMIN — ACETAMINOPHEN 650 MG: 325 TABLET ORAL at 16:31

## 2019-05-31 RX ADMIN — METOPROLOL TARTRATE 25 MG: 25 TABLET ORAL at 11:21

## 2019-05-31 RX ADMIN — ISOSORBIDE MONONITRATE 60 MG: 60 TABLET ORAL at 11:21

## 2019-05-31 RX ADMIN — AMLODIPINE BESYLATE 5 MG: 5 TABLET ORAL at 19:30

## 2019-05-31 RX ADMIN — VITAMIN D, TAB 1000IU (100/BT) 1000 UNITS: 25 TAB at 11:21

## 2019-05-31 RX ADMIN — METOPROLOL TARTRATE 25 MG: 25 TABLET ORAL at 19:30

## 2019-05-31 RX ADMIN — ACETAMINOPHEN 650 MG: 325 TABLET ORAL at 03:34

## 2019-05-31 RX ADMIN — RANOLAZINE 500 MG: 500 TABLET, FILM COATED, EXTENDED RELEASE ORAL at 19:30

## 2019-05-31 RX ADMIN — DOCUSATE SODIUM 100 MG: 100 CAPSULE, LIQUID FILLED ORAL at 11:32

## 2019-05-31 RX ADMIN — AMLODIPINE BESYLATE 5 MG: 5 TABLET ORAL at 11:21

## 2019-05-31 RX ADMIN — ACETAMINOPHEN 650 MG: 325 TABLET ORAL at 23:05

## 2019-05-31 RX ADMIN — PANTOPRAZOLE SODIUM 40 MG: 40 TABLET, DELAYED RELEASE ORAL at 06:16

## 2019-05-31 RX ADMIN — TRIMETHOPRIM 100 MG: 100 TABLET ORAL at 11:21

## 2019-05-31 ASSESSMENT — PAIN SCALES - GENERAL
PAINLEVEL_OUTOF10: 4
PAINLEVEL_OUTOF10: 1
PAINLEVEL_OUTOF10: 1
PAINLEVEL_OUTOF10: 3
PAINLEVEL_OUTOF10: 4
PAINLEVEL_OUTOF10: 8

## 2019-05-31 ASSESSMENT — PAIN DESCRIPTION - PAIN TYPE
TYPE: ACUTE PAIN
TYPE: ACUTE PAIN

## 2019-05-31 ASSESSMENT — PAIN DESCRIPTION - LOCATION
LOCATION: BACK;NECK
LOCATION: BACK;NECK

## 2019-05-31 ASSESSMENT — PAIN DESCRIPTION - ORIENTATION: ORIENTATION: INNER;MID;POSTERIOR

## 2019-05-31 NOTE — PROGRESS NOTES
Dr Masood Everett notified the results of the MRi and Neurosurgery also notified. Results were called to me that the MRI showed osteomylitis at C5 and C6 with also a slight epidural leak. These were called to me by Dr Enedina Saldaña in radiology. Message also sent to Dr Vanessa Ocaiso.

## 2019-05-31 NOTE — PROGRESS NOTES
Progress note: Infectious diseases    Patient - Fletcher Herrera,  Age - 81 y.o.    - 1935      Room Number - 5K-14/014-A   MRN -  [id number]   Acct # - [de-identified]  Date of Admission -  2019 10:53 AM    SUBJECTIVE:   No new issues  MRI report noted  Has changes discites/OM and fluid collection  ESR and CRP are normal  OBJECTIVE   VITALS    height is 5' 8\" (1.727 m) and weight is 179 lb 2 oz (81.3 kg). His oral temperature is 97.7 °F (36.5 °C). His blood pressure is 135/68 and his pulse is 66. His respiration is 16 and oxygen saturation is 95%. Wt Readings from Last 3 Encounters:   19 179 lb 2 oz (81.3 kg)   11 173 lb (78.5 kg)       I/O (24 Hours)    Intake/Output Summary (Last 24 hours) at 2019 1050  Last data filed at 2019 2110  Gross per 24 hour   Intake 2020.29 ml   Output 2073 ml   Net -52.71 ml       General Appearance  Awake, alert, oriented,  not  In acute distress  HEENT - normocephalic, atraumatic, pink conjunctiva,  anicteric sclera  Neck - Supple, no mass  Lungs -  Bilateral good air entry, no rhonchi, no wheeze  Cardiovascular - Heart sounds are normal.  Regular rate and rhythm without murmur, gallop or rub.   Abdomen - soft, not distended, nontender,   Neurologic - oriented  Skin - No bruising or bleeding  Extremities - No edema, no cyanosis, clubbing     MEDICATIONS:      amLODIPine  5 mg Oral BID    [Held by provider] aspirin EC  81 mg Oral Daily    atorvastatin  40 mg Oral Nightly    isosorbide mononitrate  60 mg Oral Daily    levothyroxine  125 mcg Oral Daily    metoprolol tartrate  25 mg Oral BID    pantoprazole  40 mg Oral QAM AC    ranolazine  500 mg Oral BID    trimethoprim  100 mg Oral Daily    vitamin D  1,000 Units Oral Daily    sodium chloride flush  10 mL Intravenous 2 times per day    [Held by provider] enoxaparin  40 mg Subcutaneous Daily     

## 2019-05-31 NOTE — PROGRESS NOTES
Hospitalist Progress Note    Patient:  Kya Najera      Unit/Bed:5K-14/014-A    YOB: 1935    MRN: [id number]       Acct: [de-identified]     PCP: Kiara Newell    Date of Admission: 5/29/2019    Chief Complaint: neck pain     Hospital Course: 81 y. o. male was sent to [facility] by his PCP with MRI result of cervical osteomyelitis.  Patient was having neck pain and arm pain for past 3 weeks without fever or chills.  Patient was getting multiple imaging studies and medical management but his symptoms persisted. MRI finally showed a lesion on cervical bone at the level of C6-7. Patient denies having any other symptom associated with infection other than pain. No recent illness.  Denies IV drug abuse. Pt had previous neck surgery about 40 years ago.      MRI done on 05/28/2019 reported that there is abnormal edema within the paraspinal soft tissue and prevertebral soft tissue, just ventral to the C6 and C7 vertebral bodies on the T1 sequence.  He has degenerative disk disease with loss of disk space, abnormal edema identified on the endplate of C6 to C7.  There was a question, small amount of epidural phlegmon versus prominence of the posterior longitudinal ligament, and the impression was severe endplate edema with paraspinal and prevertebral edema at C6-C7.  Findings could be seen in the setting of discitis or osteomyelitis or neurogenic Charcot changes.      Pt admitted for IR bone biopsy, ID and NSx on board. Subjective:  no acute events overnight. Continues to have neck pain. No extremity weakness, numbness or burning.       Medications:  Reviewed    Infusion Medications    sodium chloride 75 mL/hr at 05/30/19 7723     Scheduled Medications    amLODIPine  5 mg Oral BID    [Held by provider] aspirin EC  81 mg Oral Daily    atorvastatin  40 mg Oral Nightly    isosorbide mononitrate  60 mg Oral Daily    levothyroxine  125 mcg Oral Daily    metoprolol tartrate  25 mg Oral BID    pantoprazole  40 mg Oral QAM AC    ranolazine  500 mg Oral BID    trimethoprim  100 mg Oral Daily    vitamin D  1,000 Units Oral Daily    sodium chloride flush  10 mL Intravenous 2 times per day    [Held by provider] enoxaparin  40 mg Subcutaneous Daily     PRN Meds: docusate sodium, nitroGLYCERIN, sodium chloride flush, ondansetron, potassium chloride **OR** potassium alternative oral replacement **OR** potassium chloride, magnesium sulfate, senna, acetaminophen      Intake/Output Summary (Last 24 hours) at 5/31/2019 1236  Last data filed at 5/30/2019 2110  Gross per 24 hour   Intake 2020.29 ml   Output 2073 ml   Net -52.71 ml       Diet:  DIET GENERAL; No Added Salt (3-4 GM)    Exam:  /68   Pulse 66   Temp 97.7 °F (36.5 °C) (Oral)   Resp 16   Ht 5' 8\" (1.727 m)   Wt 179 lb 2 oz (81.3 kg)   SpO2 95%   BMI 27.24 kg/m²     General appearance: No apparent distress, appears stated age and cooperative. HEENT: Pupils equal, round, and reactive to light. Conjunctivae/corneas clear. Neck: Supple, with full range of motion. No jugular venous distention. Trachea midline. Respiratory:  Normal respiratory effort. Clear to auscultation, bilaterally without Rales/Wheezes/Rhonchi. Cardiovascular: Regular rate and rhythm with normal S1/S2 without murmurs, rubs or gallops. Abdomen: Soft, non-tender, non-distended with normal bowel sounds. Musculoskeletal: passive and active ROM x 4 extremities. Skin: Skin color, texture, turgor normal.  No rashes or lesions. Neurologic:  Neurovascularly intact without any focal sensory/motor deficits.  Cranial nerves: II-XII intact, grossly non-focal.  Psychiatric: Alert and oriented, thought content appropriate, normal insight  Capillary Refill: Brisk,< 3 seconds   Peripheral Pulses: +2 palpable, equal bilaterally       Labs:   Recent Labs     05/29/19  1154 05/30/19  0629 05/31/19  0529   WBC 12.4* 7.5 7.9   HGB 13.1* 12.8* 12.5*   HCT 40.0* 39.7* 38.7*    208 200     Recent Labs     05/29/19  1154 05/30/19  0629 05/31/19  0529    143 137   K 4.9 5.1 4.2    107 104   CO2 23 23 20*   BUN 24* 21 17   CREATININE 1.4* 1.2 1.0   CALCIUM 8.7 8.8 8.4*     Recent Labs     05/29/19  1154 05/30/19  0629   AST 24 22   ALT 31 32   BILITOT 0.4 0.5   ALKPHOS 84 79     No results for input(s): INR in the last 72 hours. No results for input(s): Bakari Shi in the last 72 hours. Urinalysis:      Lab Results   Component Value Date    NITRU NEGATIVE 11/22/2011    WBCUA 15-25 11/22/2011    BACTERIA NONE 11/22/2011    RBCUA 0-2 11/22/2011    BLOODU NEGATIVE 11/22/2011    GLUCOSEU NEGATIVE 11/22/2011       Radiology:  MRI CERVICAL SPINE W WO CONTRAST   Final Result      1. Subtle hyperintense T2 signal and enhancement is noted within the C6-7 disc space. Marrow edema and enhancement is also noted within the adjacent C6 and C7 marrow spaces. This is suspicious for discitis/osteomyelitis. There is also a small,    peripherally enhancing fluid collection extending from C6 to T1 which is suspicious for a small epidural abscess. 2. Soft tissue swelling and enhancement is noted within the prevertebral soft tissues surrounding the C6-7 level which is suspicious for infectious inflammatory changes. No defined fluid collection is identified. Note is also made of an area of signal    loss adjacent to the esophagus along the C5-6 level which may correspond to a gas-filled esophageal diverticulum. 3. Multilevel spondylotic changes are present throughout the cervical spine and are further discussed by level in the findings. Findings were conveyed to the patient's nurse, Lorne Murdock, at [phone] hours on 5/31/2019. **This report has been created using voice recognition software. It may contain minor errors which are inherent in voice recognition technology. **      Final report electronically signed by Dr. Wayne Olivarez on 5/31/2019 7:58 AM      MRI COMPARISON OF OUTSIDE FILMS   Final Result          Diet: DIET GENERAL; No Added Salt (3-4 GM)    DVT prophylaxis: [] Lovenox                                 [x] SCDs                                 [] SQ Heparin                                 [] Encourage ambulation           [] Already on Anticoagulation     Disposition:    [x] Home       [] TCU       [] Rehab       [] Psych       [] SNF       [] [facility]       [] Other-    Code Status: Full Code    Assessment/Plan:    Anticipated Discharge in : Biopsy on hold until Monday (5 days for plavix and ASA washout)     Active Hospital Problems    Diagnosis Date Noted    Acute osteomyelitis of cervical spine ([facility] [address] [M46.22] 05/29/2019    Hyperlipidemia [E78.5]     Hypertension [I10]     CAD (coronary artery disease) [I25.10]     Personal history of DVT (deep vein thrombosis) [Z86.718]          Assessment/Plan:     1. Acute OM of Cervical Spine: no history of IV drug abuse. History of cervical neck surgery 40 years ago. Cervical spin mri with contrast showing OM with possible abscess. Pending IR guided aspirate for gram stain and culture. Will have to wait until 6/3 for plavix and asa washout. NSx on board. Hold Abx for now. 2. Essential HTN: cont Norvasc, IMDUR, and Lopressor. 3. Hyperlipidemia: cont Statin  4. CAD: hold ASA and Plavix. Cont BB and statin. 5. Hypothyroidism: cont synthroid 125 mcg daily   6.  Recurrent UTI with Urinary Incontinence: pt self cath's and on Trimpex                Electronically signed by Meka Veloz MD on 5/31/2019 at 12:36 PM

## 2019-06-01 ENCOUNTER — APPOINTMENT (OUTPATIENT)
Dept: CT IMAGING | Age: 84
DRG: 472 | End: 2019-06-01
Attending: HOSPITALIST
Payer: MEDICARE

## 2019-06-01 PROCEDURE — 1200000000 HC SEMI PRIVATE

## 2019-06-01 PROCEDURE — 72125 CT NECK SPINE W/O DYE: CPT

## 2019-06-01 PROCEDURE — 99232 SBSQ HOSP IP/OBS MODERATE 35: CPT | Performed by: HOSPITALIST

## 2019-06-01 PROCEDURE — 99231 SBSQ HOSP IP/OBS SF/LOW 25: CPT | Performed by: PHYSICIAN ASSISTANT

## 2019-06-01 PROCEDURE — 2709999900 HC NON-CHARGEABLE SUPPLY

## 2019-06-01 PROCEDURE — 6370000000 HC RX 637 (ALT 250 FOR IP): Performed by: HOSPITALIST

## 2019-06-01 PROCEDURE — 2580000003 HC RX 258: Performed by: HOSPITALIST

## 2019-06-01 RX ADMIN — RANOLAZINE 500 MG: 500 TABLET, FILM COATED, EXTENDED RELEASE ORAL at 21:41

## 2019-06-01 RX ADMIN — RANOLAZINE 500 MG: 500 TABLET, FILM COATED, EXTENDED RELEASE ORAL at 09:52

## 2019-06-01 RX ADMIN — AMLODIPINE BESYLATE 5 MG: 5 TABLET ORAL at 09:52

## 2019-06-01 RX ADMIN — ISOSORBIDE MONONITRATE 60 MG: 60 TABLET ORAL at 09:52

## 2019-06-01 RX ADMIN — PANTOPRAZOLE SODIUM 40 MG: 40 TABLET, DELAYED RELEASE ORAL at 05:59

## 2019-06-01 RX ADMIN — TRIMETHOPRIM 100 MG: 100 TABLET ORAL at 09:52

## 2019-06-01 RX ADMIN — AMLODIPINE BESYLATE 5 MG: 5 TABLET ORAL at 21:44

## 2019-06-01 RX ADMIN — ATORVASTATIN CALCIUM 40 MG: 40 TABLET, FILM COATED ORAL at 21:41

## 2019-06-01 RX ADMIN — METOPROLOL TARTRATE 25 MG: 25 TABLET ORAL at 09:52

## 2019-06-01 RX ADMIN — VITAMIN D, TAB 1000IU (100/BT) 1000 UNITS: 25 TAB at 09:52

## 2019-06-01 RX ADMIN — SODIUM CHLORIDE: 9 INJECTION, SOLUTION INTRAVENOUS at 10:22

## 2019-06-01 RX ADMIN — ACETAMINOPHEN 650 MG: 325 TABLET ORAL at 17:08

## 2019-06-01 RX ADMIN — ACETAMINOPHEN 650 MG: 325 TABLET ORAL at 05:56

## 2019-06-01 RX ADMIN — LEVOTHYROXINE SODIUM 125 MCG: 125 TABLET ORAL at 05:59

## 2019-06-01 RX ADMIN — METOPROLOL TARTRATE 25 MG: 25 TABLET ORAL at 21:41

## 2019-06-01 ASSESSMENT — PAIN SCALES - GENERAL
PAINLEVEL_OUTOF10: 0
PAINLEVEL_OUTOF10: 0
PAINLEVEL_OUTOF10: 2
PAINLEVEL_OUTOF10: 4
PAINLEVEL_OUTOF10: 6
PAINLEVEL_OUTOF10: 3

## 2019-06-01 ASSESSMENT — PAIN DESCRIPTION - PROGRESSION: CLINICAL_PROGRESSION: NOT CHANGED

## 2019-06-01 ASSESSMENT — PAIN DESCRIPTION - FREQUENCY: FREQUENCY: INTERMITTENT

## 2019-06-01 ASSESSMENT — PAIN DESCRIPTION - LOCATION: LOCATION: BACK;NECK

## 2019-06-01 ASSESSMENT — PAIN DESCRIPTION - DESCRIPTORS: DESCRIPTORS: SHOOTING;SHARP

## 2019-06-01 ASSESSMENT — PAIN DESCRIPTION - PAIN TYPE: TYPE: ACUTE PAIN

## 2019-06-01 ASSESSMENT — PAIN - FUNCTIONAL ASSESSMENT: PAIN_FUNCTIONAL_ASSESSMENT: PREVENTS OR INTERFERES SOME ACTIVE ACTIVITIES AND ADLS

## 2019-06-01 ASSESSMENT — PAIN DESCRIPTION - ORIENTATION: ORIENTATION: MID;INNER;POSTERIOR

## 2019-06-01 ASSESSMENT — PAIN DESCRIPTION - ONSET: ONSET: ON-GOING

## 2019-06-01 NOTE — PROGRESS NOTES
Neurosurgery Progress Note    Patient:  Cinthia Pablo      Unit/Bed:5K-14/014-A    YOB: 1935    MRN: [id number]     Acct: [de-identified]     Admit date: 5/29/2019    No chief complaint on file. Patient Seen, Chart, Physician notes, Labs, Radiology studies reviewed. Subjective: Patient is status post admission for a follow-up to suspected osteomyelitis as imaged on MRI of the cervical spine from 5/30/2019. He is resting comfortably today sitting up in a chair without significant complaints. Past, Family, Social History unchanged from admission. Diet:  DIET GENERAL; No Added Salt (3-4 GM)    Medications:  Scheduled Meds:   amLODIPine  5 mg Oral BID    [Held by provider] aspirin EC  81 mg Oral Daily    atorvastatin  40 mg Oral Nightly    isosorbide mononitrate  60 mg Oral Daily    levothyroxine  125 mcg Oral Daily    metoprolol tartrate  25 mg Oral BID    pantoprazole  40 mg Oral QAM AC    ranolazine  500 mg Oral BID    trimethoprim  100 mg Oral Daily    vitamin D  1,000 Units Oral Daily    sodium chloride flush  10 mL Intravenous 2 times per day    [Held by provider] enoxaparin  40 mg Subcutaneous Daily     Continuous Infusions:   sodium chloride 75 mL/hr at 06/01/19 1022     PRN Meds:docusate sodium, nitroGLYCERIN, sodium chloride flush, ondansetron, potassium chloride **OR** potassium alternative oral replacement **OR** potassium chloride, magnesium sulfate, senna, acetaminophen    Objective:  She is sitting up in a chair comfortably without significant complaints. Family was present for the exam and participated in discussions with Dr. Estella Schaumann regarding the patient's probable diagnosis and plan of care. Vitals: /65   Pulse 60   Temp 98 °F (36.7 °C) (Oral)   Resp 18   Ht 5' 8\" (1.727 m)   Wt 179 lb 2 oz (81.3 kg)   SpO2 98%   BMI 27.24 kg/m²   Physical Exam:  Alert and attentive. Language appropriate, with no aphasia.   Pupils equal.  Facial strength symmetric. Physical Exam    ROS:  Review of Systems      24 hour intake/output:    Intake/Output Summary (Last 24 hours) at 6/1/2019 1557  Last data filed at 6/1/2019 1450  Gross per 24 hour   Intake 3450.95 ml   Output 3100 ml   Net 350.95 ml     Last 3 weights: Wt Readings from Last 3 Encounters:   05/29/19 179 lb 2 oz (81.3 kg)   12/01/11 173 lb (78.5 kg)         CBC:   Recent Labs     05/30/19  0629 05/31/19  0529   WBC 7.5 7.9   HGB 12.8* 12.5*    200     BMP:    Recent Labs     05/30/19  0629 05/31/19  0529    137   K 5.1 4.2    104   CO2 23 20*   BUN 21 17   CREATININE 1.2 1.0   GLUCOSE 126* 104     Calcium:  Recent Labs     05/31/19  0529   CALCIUM 8.4*     Magnesium:  Recent Labs     05/31/19  0529   MG 1.9     Glucose:No results for input(s): POCGLU in the last 72 hours. HgbA1C: No results for input(s): LABA1C in the last 72 hours. Lipids: No results for input(s): CHOL, TRIG, HDL, LDLCALC in the last 72 hours. Invalid input(s): LDL    Radiology reports as per the Radiologist  Radiology: Mri Cervical Spine W Wo Contrast    Result Date: 5/31/2019  PROCEDURE: MRI CERVICAL SPINE W WO CONTRAST CLINICAL INFORMATION: NECK PAIN, CERVICAL SPINE, mri on 5/28 showing possible cervical spine OM/discitis. COMPARISON: None available. Correlation is made to MRI of the cervical spine dated May 28, 2019 and performed at [address] [place] [address]. TECHNIQUE: Sagittal T1, T2 and STIR sequences were obtained through the cervical spine. Axial fast and echo and gradient echo T2-weighted images were obtained. Postcontrast axial and sagittal T1-weighted images were obtained. Postcontrast images were obtained after administration of 15 mL ProHance intravenous contrast. FINDINGS: The cervical spine is imaged from the craniocervical junction to the T3 level. No suspicious finding is identified at the cervical medullary junction. No abnormal signal or expansion is present within the cervical spinal cord.  No abnormal enhancement is seen within the cord parenchyma. There is a subtle, peripherally enhancing collection within the epidural space posterior to the C6-T1 levels. The collection measures approximately 0.3 x 0.7 x 3.5 cm. There is straightening of the expected cervical lordosis. Anterolisthesis is noted of C3 on C4 which measures approximately 0.2 cm. There is also minimal anterolisthesis of C7 on T1. Bony fusion is noted across the C4-5 and C5-6 disc spaces. There is subtle hyperintense T2 signal and enhancement within the C6-7 disc space. Marrow edema and enhancement is also noted within the adjacent C6 and C7 marrow spaces. With regards to the disc spaces, at C2-C3, the spinal canal is patent. Uncovertebral joint spurring and facet arthropathy results in mild neural foraminal narrowing on the left without significant neural foraminal narrowing on the right. At C3-C4, there is uncovering of the disc. The spinal canal is patent. Uncovertebral joint spurring and facet arthropathy causes mild to moderate neural foraminal narrowing bilaterally. At C4-C5, the spinal canal is patent. Uncovertebral joint spurring and facet arthropathy causes mild to moderate neural foraminal narrowing on the right and mild neural foraminal narrowing on the left. At C5-C6, the spinal canal is patent. Uncovertebral joint spurring is present causing mild bilateral neural foraminal narrowing. At C6-C7, there is a disc osteophyte complex. This results in mild spinal canal narrowing. Uncovertebral joint spurring is present causing mild bilateral neural foraminal narrowing. At C7-T1, there is a disc osteophyte complex. The spinal canal is patent. Uncovertebral joint spurring causes mild neural foraminal narrowing bilaterally. Soft tissue swelling and enhancement is noted involving the prevertebral soft tissues along the C6-7 level. No defined fluid collection is identified.  Note is also made of signal loss along the right side of the esophagus at the

## 2019-06-01 NOTE — PROGRESS NOTES
Hospitalist Progress Note    Patient:  Cami Urias      Unit/Bed:5K-14/014-A    YOB: 1935    MRN: [id number]       Acct: [de-identified]     PCP: Nicola Fery    Date of Admission: 5/29/2019    Assessment/Plan:    80 y/o male w/ h/o HTN, CAD s/p stents and CABG, lipidemia, and hypothyroid is admitted for acute discites/OM and fluid collection. 1) Acute osteomyelitis of cervical spine: Likely will need cervical surgical intervention. Will consult cardiology for cardiac clearance given extensive h/o cardiac stents and CABG. MRI disclosed subtle hyperintense T2 signal and enhancement is noted within the C6-7 disc space. Marrow edema and enhancement is also noted within the adjacent C6 and C7 marrow spaces. This is suspicious for discitis/osteomyelitis  Stable exam noted. CT cervical spine pending. Followed by ID and neurosurgery. Inflammatory markers negative. 2) H/o CAD: Extensive cardiac history. Plavix and ASA on hold. Cards consulted for clearance. 3) HTN: SBP 130s  C/w norvasc, imdur, and lopressor. Expected discharge date:  4-5 days    Disposition:    [x] Home       [] TCU       [] Rehab       [] Psych       [] SNF       [] [facility]       [] Other-    Subjective (past 24 hours): No new complaint. No overnight event.       Medications:  Reviewed    Infusion Medications    sodium chloride 75 mL/hr at 06/01/19 1022     Scheduled Medications    amLODIPine  5 mg Oral BID    [Held by provider] aspirin EC  81 mg Oral Daily    atorvastatin  40 mg Oral Nightly    isosorbide mononitrate  60 mg Oral Daily    levothyroxine  125 mcg Oral Daily    metoprolol tartrate  25 mg Oral BID    pantoprazole  40 mg Oral QAM AC    ranolazine  500 mg Oral BID    trimethoprim  100 mg Oral Daily    vitamin D  1,000 Units Oral Daily    sodium chloride flush  10 mL Intravenous 2 times per day    [Held by provider] enoxaparin  40 mg Subcutaneous Daily     PRN Meds: docusate sodium, nitroGLYCERIN, sodium chloride flush, ondansetron, potassium chloride **OR** potassium alternative oral replacement **OR** potassium chloride, magnesium sulfate, senna, acetaminophen      Intake/Output Summary (Last 24 hours) at 6/1/2019 1641  Last data filed at 6/1/2019 1450  Gross per 24 hour   Intake 3450.95 ml   Output 3100 ml   Net 350.95 ml       Diet:  DIET GENERAL; No Added Salt (3-4 GM)    Exam:  /65   Pulse 68   Temp 97.5 °F (36.4 °C) (Oral)   Resp 16   Ht 5' 8\" (1.727 m)   Wt 179 lb 2 oz (81.3 kg)   SpO2 96%   BMI 27.24 kg/m²     General appearance: No apparent distress, appears stated age and cooperative. HEENT: Pupils equal, round, and reactive to light. Conjunctivae/corneas clear. Neck: Supple, with full range of motion. No jugular venous distention. Trachea midline. Respiratory:  Normal respiratory effort. Clear to auscultation, bilaterally without Rales/Wheezes/Rhonchi. Cardiovascular: Regular rate and rhythm with normal S1/S2 without murmurs, rubs or gallops. Abdomen: Soft, non-tender, non-distended with normal bowel sounds. Musculoskeletal: passive and active ROM x 4 extremities. Skin: Skin color, texture, turgor normal.  No rashes or lesions. Neurologic:  Neurovascularly intact without any focal sensory/motor deficits. Cranial nerves: II-XII intact, grossly non-focal.  Psychiatric: Alert and oriented, thought content appropriate, normal insight  Capillary Refill: Brisk,< 3 seconds   Peripheral Pulses: +2 palpable, equal bilaterally       Labs:   Recent Labs     05/30/19 0629 05/31/19 0529   WBC 7.5 7.9   HGB 12.8* 12.5*   HCT 39.7* 38.7*    200     Recent Labs     05/30/19 0629 05/31/19 0529    137   K 5.1 4.2    104   CO2 23 20*   BUN 21 17   CREATININE 1.2 1.0   CALCIUM 8.8 8.4*     Recent Labs     05/30/19 0629   AST 22   ALT 32   BILITOT 0.5   ALKPHOS 79     No results for input(s): INR in the last 72 hours.   No results for input(s): Brett Elle in the last 72 hours. Microbiology:      Urinalysis:      Lab Results   Component Value Date    NITRU NEGATIVE 11/22/2011    WBCUA 15-25 11/22/2011    BACTERIA NONE 11/22/2011    RBCUA 0-2 11/22/2011    BLOODU NEGATIVE 11/22/2011    GLUCOSEU NEGATIVE 11/22/2011       Radiology:  MRI CERVICAL SPINE W WO CONTRAST   Final Result      1. Subtle hyperintense T2 signal and enhancement is noted within the C6-7 disc space. Marrow edema and enhancement is also noted within the adjacent C6 and C7 marrow spaces. This is suspicious for discitis/osteomyelitis. There is also a small,    peripherally enhancing fluid collection extending from C6 to T1 which is suspicious for a small epidural abscess. 2. Soft tissue swelling and enhancement is noted within the prevertebral soft tissues surrounding the C6-7 level which is suspicious for infectious inflammatory changes. No defined fluid collection is identified. Note is also made of an area of signal    loss adjacent to the esophagus along the C5-6 level which may correspond to a gas-filled esophageal diverticulum. 3. Multilevel spondylotic changes are present throughout the cervical spine and are further discussed by level in the findings. Findings were conveyed to the patient's nurse, Abdulaziz Parks, at [phone] hours on 5/31/2019. **This report has been created using voice recognition software. It may contain minor errors which are inherent in voice recognition technology. **      Final report electronically signed by Dr. Riya Gutiérrez on 5/31/2019 7:58 AM      MRI COMPARISON OF OUTSIDE FILMS   Final Result      CT CERVICAL SPINE WO CONTRAST    (Results Pending)       DVT prophylaxis: [] Lovenox                                 [] SCDs                                 [] SQ Heparin                                 [] Encourage ambulation           [] Already on Anticoagulation     Code Status: [address] [place] [address] Problems    Diagnosis Date Noted    Acute osteomyelitis of cervical spine ([facility] [address] [M46.22] 05/29/2019    Hyperlipidemia [E78.5]     Hypertension [I10]     CAD (coronary artery disease) [I25.10]     Personal history of DVT (deep vein thrombosis) [K63.197]        Electronically signed by Jimmy Masterson MD on 6/1/2019 at 4:41 PM

## 2019-06-01 NOTE — CONSULTS
chest tightness. Cardiovascular: Negative for chest pain. Gastrointestinal: Negative for abdominal pain. Genitourinary: Negative for difficulty urinating. Musculoskeletal: Positive for myalgias and neck pain. Neurological: Negative for weakness and numbness. Psychiatric/Behavioral: Negative for agitation and confusion. PROBLEM LIST:  Patient Active Problem List   Diagnosis    CAD (coronary artery disease)    Personal history of DVT (deep vein thrombosis)    Hyperlipidemia    Hypertension    Stented coronary artery, last 2004    Transverse myelitis, 1997    Pulmonary embolism ([facility] [address]    Acute osteomyelitis of cervical spine (HCC)       MEDICATIONS:   Prior to Admission medications    Medication Sig Start Date End Date Taking? Authorizing Provider   levothyroxine (SYNTHROID) 125 MCG tablet Take 125 mcg by mouth Daily 1/2 tablet on empty stomach   Yes Historical Provider, MD   vitamin D (CHOLECALCIFEROL) 1000 UNIT TABS tablet Take 1,000 Units by mouth daily   Yes Historical Provider, MD   isosorbide mononitrate (IMDUR) 60 MG extended release tablet Take 60 mg by mouth daily   Yes Historical Provider, MD   trimethoprim (TRIMPEX) 100 MG tablet Take 100 mg by mouth daily   Yes Historical Provider, MD   atorvastatin (LIPITOR) 40 MG tablet Take 40 mg by mouth nightly   Yes Historical Provider, MD   ranolazine (RANEXA) 500 MG extended release tablet Take 500 mg by mouth 2 times daily   Yes Historical Provider, MD   pantoprazole sodium (PROTONIX) 40 MG PACK packet Take 40 mg by mouth every morning (before breakfast)   Yes Historical Provider, MD   clopidogrel (PLAVIX) 75 MG tablet Take 75 mg by mouth daily. Yes Historical Provider, MD   metoprolol (LOPRESSOR) 50 MG tablet Take 25 mg by mouth 2 times daily. Yes Historical Provider, MD   aspirin EC 81 MG EC tablet Take 81 mg by mouth daily. Yes Historical Provider, MD   amlodipine (NORVASC) 10 MG tablet Take 5 mg by mouth 2 times daily. angioplasty ([facility], 1993, [facility], 2000, 2004); Cardiac catheterization (2001, 2006, 2009); Coronary angioplasty with stent ([id number]); and shoulder surgery. SOCIAL HISTORY:   Social History     Tobacco Use    Smoking status: Former Smoker    Smokeless tobacco: Former User     Quit date: 1/1/1967   Substance Use Topics    Alcohol use: Yes       FAMILY HISTORY:  Family History   Problem Relation Age of Onset    Diabetes Mother     Stroke Mother     Heart Disease Father        LABS    Results for Tiffanie Vigil (MRN [id number]) as of 6/1/2019 19:45     Ref. Range 5/29/2019 11:54   WBC Latest Ref Range: 4.8 - 10.8 thou/mm3 12.4 (H)   RBC Latest Ref Range: 4.70 - 6.10 mill/mm3 4.00 (L)   Hemoglobin Quant Latest Ref Range: 14.0 - 18.0 gm/dl 13.1 (L)   Hematocrit Latest Ref Range: 42.0 - 52.0 % 40.0 (L)   MCV Latest Ref Range: 80.0 - 94.0 fL 100.0 (H)   MCH Latest Ref Range: 26.0 - 33.0 pg 32.8   MCHC Latest Ref Range: 32.2 - 35.5 gm/dl 32.8   MPV Latest Ref Range: 9.4 - 12.4 fL 9.2 (L)   RDW-CV Latest Ref Range: 11.5 - 14.5 % 12.9   RDW-SD Latest Ref Range: 35.0 - 45.0 fL 46.9 (H)   Platelet Count Latest Ref Range: 130 - 400 thou/mm3 212   Lymphocytes # Latest Ref Range: 1.0 - 4.8 thou/mm3 2.4   Monocytes # Latest Ref Range: 0.4 - 1.3 thou/mm3 1.2   Eosinophils # Latest Ref Range: 0.0 - 0.4 thou/mm3 0.2   Basophils # Latest Ref Range: 0.0 - 0.1 thou/mm3 0.0   Seg Neutrophils Latest Units: % 68.8   Segs Absolute Latest Ref Range: 1.8 - 7.7 thou/mm3 8.5 (H)   Lymphocytes Latest Units: % 19.5   Monocytes Latest Units: % 9.3   Eosinophils Latest Units: % 1.5   Basophils Latest Units: % 0.3   Immature Grans (Abs) Latest Ref Range: 0.00 - 0.07 thou/mm3 0.07   Immature Granulocytes Latest Units: % 0.6   Nucleated Red Blood Cells Latest Units: /100 wbc 0   Sed Rate Latest Ref Range: 0 - 10 mm/hr 27 (H)        Ref.  Range 5/29/2019 11:54   Sodium Latest Ref Range: 135 - 145 meq/L 139   Potassium Latest Ref Range: 3.5 - 5.2 meq/L 4.9   Chloride Latest Ref Range: 98 - 111 meq/L 104   CO2 Latest Ref Range: 23 - 33 meq/L 23   BUN Latest Ref Range: 7 - 22 mg/dL 24 (H)   Creatinine Latest Ref Range: 0.4 - 1.2 mg/dL 1.4 (H)   Anion Gap Latest Ref Range: 8.0 - 16.0 meq/L 12.0   Est, Glom Filt Rate Latest Units: ml/min/1.73m2 48 (A)   Glucose Latest Ref Range: 70 - 108 mg/dL 157 (H)   Calcium Latest Ref Range: 8.5 - 10.5 mg/dL 8.7   Total Protein Latest Ref Range: 6.1 - 8.0 g/dL 6.6   CRP Latest Ref Range: 0.00 - 1.00 mg/dl 0.69   Albumin Latest Ref Range: 3.5 - 5.1 g/dL 3.4 (L)   Alk Phos Latest Ref Range: 38 - 126 U/L 84   ALT Latest Ref Range: 11 - 66 U/L 31   AST Latest Ref Range: 5 - 40 U/L 24   Bilirubin Latest Ref Range: 0.3 - 1.2 mg/dL 0.4   RADIOLOGY:  Pertinent images have been reviewed. Cervical spine MRI showed:    Subtle hyperintense T2 signal and enhancement is noted within the C6-7 disc space. Marrow edema and enhancement is also noted within the adjacent C6 and C7 marrow spaces. This is suspicious for discitis/osteomyelitis. There is also a small,    peripherally enhancing fluid collection extending from C6 to T1 which is suspicious for a small epidural abscess. EXAMINATION:  Patient awake alert and oriented x3  GCS: 15/15   Pupils: equal and reactive   FCx4 with good strength through out   Sensory: grossly intact  Reflexes: 1+ through out. Planter reflex: Down response  Severe neck pain and tenderness in posterior aspects with C-spine limitation in movement. *Time spent with the patient was 50  minutes. More than 50% was spent counseling/coordinating the patient's care.        Lea Macias MD  Electronically signed 6/1/2019 at 4:00 pm

## 2019-06-02 LAB
ANION GAP SERPL CALCULATED.3IONS-SCNC: 11 MEQ/L (ref 8–16)
BUN BLDV-MCNC: 18 MG/DL (ref 7–22)
CALCIUM SERPL-MCNC: 8.8 MG/DL (ref 8.5–10.5)
CHLORIDE BLD-SCNC: 104 MEQ/L (ref 98–111)
CO2: 24 MEQ/L (ref 23–33)
CREAT SERPL-MCNC: 1.1 MG/DL (ref 0.4–1.2)
ERYTHROCYTE [DISTWIDTH] IN BLOOD BY AUTOMATED COUNT: 13 % (ref 11.5–14.5)
ERYTHROCYTE [DISTWIDTH] IN BLOOD BY AUTOMATED COUNT: 46.4 FL (ref 35–45)
GFR SERPL CREATININE-BSD FRML MDRD: 64 ML/MIN/1.73M2
GLUCOSE BLD-MCNC: 154 MG/DL (ref 70–108)
HCT VFR BLD CALC: 39.2 % (ref 42–52)
HEMOGLOBIN: 13.1 GM/DL (ref 14–18)
MCH RBC QN AUTO: 32.8 PG (ref 26–33)
MCHC RBC AUTO-ENTMCNC: 33.4 GM/DL (ref 32.2–35.5)
MCV RBC AUTO: 98 FL (ref 80–94)
PLATELET # BLD: 237 THOU/MM3 (ref 130–400)
PMV BLD AUTO: 9.3 FL (ref 9.4–12.4)
POTASSIUM SERPL-SCNC: 4.4 MEQ/L (ref 3.5–5.2)
RBC # BLD: 4 MILL/MM3 (ref 4.7–6.1)
SODIUM BLD-SCNC: 139 MEQ/L (ref 135–145)
WBC # BLD: 7.5 THOU/MM3 (ref 4.8–10.8)

## 2019-06-02 PROCEDURE — 6370000000 HC RX 637 (ALT 250 FOR IP): Performed by: HOSPITALIST

## 2019-06-02 PROCEDURE — 2709999900 HC NON-CHARGEABLE SUPPLY

## 2019-06-02 PROCEDURE — 80048 BASIC METABOLIC PNL TOTAL CA: CPT

## 2019-06-02 PROCEDURE — 36415 COLL VENOUS BLD VENIPUNCTURE: CPT

## 2019-06-02 PROCEDURE — 2580000003 HC RX 258: Performed by: HOSPITALIST

## 2019-06-02 PROCEDURE — 85027 COMPLETE CBC AUTOMATED: CPT

## 2019-06-02 PROCEDURE — 1200000000 HC SEMI PRIVATE

## 2019-06-02 PROCEDURE — 99232 SBSQ HOSP IP/OBS MODERATE 35: CPT | Performed by: HOSPITALIST

## 2019-06-02 PROCEDURE — 99223 1ST HOSP IP/OBS HIGH 75: CPT | Performed by: INTERNAL MEDICINE

## 2019-06-02 RX ADMIN — RANOLAZINE 500 MG: 500 TABLET, FILM COATED, EXTENDED RELEASE ORAL at 20:02

## 2019-06-02 RX ADMIN — VITAMIN D, TAB 1000IU (100/BT) 1000 UNITS: 25 TAB at 08:45

## 2019-06-02 RX ADMIN — AMLODIPINE BESYLATE 5 MG: 5 TABLET ORAL at 08:45

## 2019-06-02 RX ADMIN — DOCUSATE SODIUM 100 MG: 100 CAPSULE, LIQUID FILLED ORAL at 08:45

## 2019-06-02 RX ADMIN — ATORVASTATIN CALCIUM 40 MG: 40 TABLET, FILM COATED ORAL at 20:02

## 2019-06-02 RX ADMIN — ACETAMINOPHEN 650 MG: 325 TABLET ORAL at 21:53

## 2019-06-02 RX ADMIN — METOPROLOL TARTRATE 25 MG: 25 TABLET ORAL at 08:46

## 2019-06-02 RX ADMIN — ISOSORBIDE MONONITRATE 60 MG: 60 TABLET ORAL at 08:46

## 2019-06-02 RX ADMIN — ACETAMINOPHEN 650 MG: 325 TABLET ORAL at 06:14

## 2019-06-02 RX ADMIN — SODIUM CHLORIDE: 9 INJECTION, SOLUTION INTRAVENOUS at 01:12

## 2019-06-02 RX ADMIN — TRIMETHOPRIM 100 MG: 100 TABLET ORAL at 08:45

## 2019-06-02 RX ADMIN — PANTOPRAZOLE SODIUM 40 MG: 40 TABLET, DELAYED RELEASE ORAL at 06:14

## 2019-06-02 RX ADMIN — METOPROLOL TARTRATE 25 MG: 25 TABLET ORAL at 20:02

## 2019-06-02 RX ADMIN — LEVOTHYROXINE SODIUM 125 MCG: 125 TABLET ORAL at 06:13

## 2019-06-02 RX ADMIN — RANOLAZINE 500 MG: 500 TABLET, FILM COATED, EXTENDED RELEASE ORAL at 08:45

## 2019-06-02 RX ADMIN — SODIUM CHLORIDE: 9 INJECTION, SOLUTION INTRAVENOUS at 15:19

## 2019-06-02 RX ADMIN — AMLODIPINE BESYLATE 5 MG: 5 TABLET ORAL at 20:02

## 2019-06-02 RX ADMIN — ACETAMINOPHEN 650 MG: 325 TABLET ORAL at 12:23

## 2019-06-02 ASSESSMENT — PAIN DESCRIPTION - DESCRIPTORS: DESCRIPTORS: SHOOTING

## 2019-06-02 ASSESSMENT — PAIN DESCRIPTION - ORIENTATION: ORIENTATION: MID

## 2019-06-02 ASSESSMENT — PAIN DESCRIPTION - PAIN TYPE: TYPE: ACUTE PAIN

## 2019-06-02 ASSESSMENT — PAIN DESCRIPTION - FREQUENCY: FREQUENCY: INTERMITTENT

## 2019-06-02 ASSESSMENT — PAIN DESCRIPTION - ONSET: ONSET: ON-GOING

## 2019-06-02 ASSESSMENT — PAIN SCALES - GENERAL
PAINLEVEL_OUTOF10: 3
PAINLEVEL_OUTOF10: 4
PAINLEVEL_OUTOF10: 0
PAINLEVEL_OUTOF10: 3
PAINLEVEL_OUTOF10: 0
PAINLEVEL_OUTOF10: 2
PAINLEVEL_OUTOF10: 8
PAINLEVEL_OUTOF10: 4

## 2019-06-02 ASSESSMENT — PAIN DESCRIPTION - LOCATION: LOCATION: NECK;BACK

## 2019-06-02 NOTE — CONSULTS
[facility]    Patient's Name/Date of Birth: Shanon Dang / 1935 (53 y.o.)    Date: June 2, 2019     Referring Provider: Tino Hanks MD    CHIEF COMPLAINT:  Pre-op evaluation      HPI: This is a pleasant 81 y.o. male presents with cervical osteomyelitis. 1 week of worsening arm, upper back, and neck pain. MRI documents cervical osteo at C6-7. Patient has hx of CABG. He has complaints of ROBINS and sob with a flight of stairs. He has no chest pain or LOC. Denies palpitations. He is on ASA, Lipitor, Imdur, Metoprolol, Ranolazine. He had a cath a few years ago at [facility] 2012 showing the findings as noted below. He states he does feel chest twinges through out the day. He does not know if the pain is exertional, but thinks it is possible. He takes all of his medications. CORONARY FINDINGS:  DOMINANCE:  Right dominant  LMCA:  No angiographically definable stenosis  LAD:  (mid) 50 % lesion. LAD:  (distal) 90 % lesion  DIAGONAL 1:  (ostial) large diameter vessel 50 % lesion  CIRCUMFLEX:  (ostial) 70 % lesion  OM 1:  (ostial) 80 % lesion, high origin  OM 2: (mid) totally occluded  RCA:  (proximal) previously placed stent is patent. RCA:  (mid) previously  placed stent is patent. RCA:  (mid) 50 % lesion, between previously placed  stents. RCA:  (distal) previously placed stent is patent  R PDA: (proximal) 75 % lesion  R PLB:  (ostial) 99 % lesion  COLLATERALS:  There is Collateral flow from left to right  GRAFTS:  LIMA graft to the LAD (mid/distal) is widely patent  Y graft to the PDA and PLB is widely patent  Saphenous Venous graft to the second OM is totally occluded  Saphenous Venous graft to the first diagonal is widely patent  LV / VALVE FINDINGS:  LV Gram - 60 % EF. Normal EDP. Normal wall motion. Echo: No results found for this or any previous visit.      All labs, EKG's, diagnostic testing and images as well as cardiac cath, stress testing were reviewed vitamin D (CHOLECALCIFEROL) tablet 1,000 Units  1,000 Units Oral Daily Ailyn Charles, DO   1,000 Units at 06/02/19 0845    sodium chloride flush 0.9 % injection 10 mL  10 mL Intravenous 2 times per day Ailyn Charles, DO        sodium chloride flush 0.9 % injection 10 mL  10 mL Intravenous PRN Ailyn Charles, DO        ondansetron [facility]) injection 4 mg  4 mg Intravenous Q6H PRN Ailyn Charles, DO        0.9 % sodium chloride infusion   Intravenous Continuous Ailyn Charles, DO 75 mL/hr at 06/02/19 0112      potassium chloride (KLOR-CON M) extended release tablet 40 mEq  40 mEq Oral PRN Ailyn Charles, DO        Or    potassium bicarb-citric acid (EFFER-K) effervescent tablet 40 mEq  40 mEq Oral PRN Ailyn Charles, DO        Or    potassium chloride 10 mEq/100 mL IVPB (Peripheral Line)  10 mEq Intravenous PRN Ailyn Charles, DO        magnesium sulfate 1 g in dextrose 5 % 100 mL IVPB  1 g Intravenous PRN Ailyn Charles, DO        senna (SENOKOT) tablet 8.6 mg  1 tablet Oral Daily PRN Ailyn Charles, DO        [Held by provider] enoxaparin (LOVENOX) injection 40 mg  40 mg Subcutaneous Daily Ailyn Charles, DO   40 mg at 05/29/19 2032    acetaminophen (TYLENOL) tablet 650 mg  650 mg Oral Q6H PRN Ailyn Charles, DO   650 mg at 06/02/19 1223     Prior to Admission medications    Medication Sig Start Date End Date Taking?  Authorizing Provider   levothyroxine (SYNTHROID) 125 MCG tablet Take 125 mcg by mouth Daily 1/2 tablet on empty stomach   Yes Historical Provider, MD   vitamin D (CHOLECALCIFEROL) 1000 UNIT TABS tablet Take 1,000 Units by mouth daily   Yes Historical Provider, MD   isosorbide mononitrate (IMDUR) 60 MG extended release tablet Take 60 mg by mouth daily   Yes Historical Provider, MD   trimethoprim (TRIMPEX) 100 MG tablet Take 100 mg by mouth daily   Yes Historical Provider, MD   atorvastatin (LIPITOR) 40 MG tablet Take 40 mg by mouth nightly   Yes Historical Provider, MD   ranolazine (RANEXA) 500 MG extended release tablet Take 500 mg by mouth 2 times daily   Yes Historical Provider, MD   pantoprazole sodium (PROTONIX) 40 MG PACK packet Take 40 mg by mouth every morning (before breakfast)   Yes Historical Provider, MD   clopidogrel (PLAVIX) 75 MG tablet Take 75 mg by mouth daily. Yes Historical Provider, MD   metoprolol (LOPRESSOR) 50 MG tablet Take 25 mg by mouth 2 times daily. Yes Historical Provider, MD   aspirin EC 81 MG EC tablet Take 81 mg by mouth daily. Yes Historical Provider, MD   amlodipine (NORVASC) 10 MG tablet Take 5 mg by mouth 2 times daily. Yes Historical Provider, MD   acetaminophen (TYLENOL) 500 MG tablet Take 500 mg by mouth every 6 hours as needed. Yes Historical Provider, MD   docusate sodium (COLACE) 100 MG capsule Take 100 mg by mouth 2 times daily as needed    Yes Historical Provider, MD   nitroGLYCERIN (NITROSTAT) 0.4 MG SL tablet Place 0.4 mg under the tongue every 5 minutes as needed.       Historical Provider, MD   Scheduled Meds:   amLODIPine  5 mg Oral BID    [Held by provider] aspirin EC  81 mg Oral Daily    atorvastatin  40 mg Oral Nightly    isosorbide mononitrate  60 mg Oral Daily    levothyroxine  125 mcg Oral Daily    metoprolol tartrate  25 mg Oral BID    pantoprazole  40 mg Oral QAM AC    ranolazine  500 mg Oral BID    trimethoprim  100 mg Oral Daily    vitamin D  1,000 Units Oral Daily    sodium chloride flush  10 mL Intravenous 2 times per day    [Held by provider] enoxaparin  40 mg Subcutaneous Daily     Continuous Infusions:   sodium chloride 75 mL/hr at 06/02/19 0112     PRN Meds:.docusate sodium, nitroGLYCERIN, sodium chloride flush, ondansetron, potassium chloride **OR** potassium alternative oral replacement **OR** potassium chloride, magnesium sulfate, senna, acetaminophen    Allergies   Allergen Reactions    Lopid [Gemfibrozil] Other (See Comments)     Unable to walk    Zocor [Simvastatin] Other (See Comments)     Unable to walk    Flomax [Tamsulosin Hcl] Other (See Comments)     dizziness    Levaquin [Levofloxacin] Other (See Comments)     Patient cannot remember    Amoxicillin-Pot Clavulanate Rash     Family History   Problem Relation Age of Onset    Diabetes Mother     Stroke Mother     Heart Disease Father      Social History     Socioeconomic History    Marital status:      Spouse name: Not on file    Number of children: Not on file    Years of education: Not on file    Highest education level: Not on file   Occupational History    Occupation: farmer   Social Needs    Financial resource strain: Not on file    Food insecurity:     Worry: Not on file     Inability: Not on file    Transportation needs:     Medical: Not on file     Non-medical: Not on file   Tobacco Use    Smoking status: Former Smoker    Smokeless tobacco: Former User     Quit date: 1/1/1967   Substance and Sexual Activity    Alcohol use: Yes    Drug use: No    Sexual activity: Not on file   Lifestyle    Physical activity:     Days per week: Not on file     Minutes per session: Not on file    Stress: Not on file   Relationships    Social connections:     Talks on phone: Not on file     Gets together: Not on file     Attends [employer] service: Not on file     Active member of club or organization: Not on file     Attends meetings of clubs or organizations: Not on file     Relationship status: Not on file    Intimate partner violence:     Fear of current or ex partner: Not on file     Emotionally abused: Not on file     Physically abused: Not on file     Forced sexual activity: Not on file   Other Topics Concern    Not on file   Social History Narrative    Not on file     ROS:   Constitutional: Denies any recent wt change. Eyes:  Denies any blurring or double vision, no glaucoma  Ears/Nose/Mouth/Throat:  Denies any chronic sinus/rhinitis, bleeding gums  Cardiovascular:  As described above.     Respiratory:  Denies any frequent cough, wheezing or coughing up blood  Genitourinary:  Denies difficulty with urination and kidney stones  Gastrointestinal:  Denies any chronic problems with abdominal pain, nausea, vomiting or diarrhea  Musculoskeletal:  Denies any joint pain, back pain, or difficulty walking  Integumentary:  Denies any rash  Neurological:  No numbness or tingling  Endocrine:  Denies any polydipsia. Hematologic/Lymphatic:  Denies any hemorrhage or lymphatic drainage problems. Labs:  CBC:   Recent Labs     05/31/19  0529 06/02/19  1256   WBC 7.9 7.5   HGB 12.5* 13.1*   HCT 38.7* 39.2*   MCV 99.7* 98.0*    237     BMP:   Recent Labs     05/31/19  0529 06/02/19  1256    139   K 4.2 4.4    104   CO2 20* 24   BUN 17 18   CREATININE 1.0 1.1   MG 1.9  --      Accucheck Glucoses: No results for input(s): POCGLU in the last 72 hours. Cardiac Enzymes: No results for input(s): CKTOTAL, CKMB, CKMBINDEX, TROPONINI in the last 72 hours. PT/INR: No results for input(s): PROTIME, INR in the last 72 hours. APTT: No results for input(s): APTT in the last 72 hours.   Liver Profile:  Lab Results   Component Value Date    AST 22 05/30/2019    ALT 32 05/30/2019    BILITOT 0.5 05/30/2019    ALKPHOS 79 05/30/2019   No results found for: CHOL, HDL, TRIG  TSH: No results found for: TSH  UA:   Lab Results   Component Value Date    COLORU YELLOW 11/22/2011    PHUR 6.0 11/22/2011    WBCUA 15-25 11/22/2011    RBCUA 0-2 11/22/2011    YEAST NONE SEEN 11/22/2011    BACTERIA NONE 11/22/2011    LEUKOCYTESUR MODERATE 11/22/2011    UROBILINOGEN 0.2 11/22/2011    BILIRUBINUR NEGATIVE 11/22/2011    BLOODU NEGATIVE 11/22/2011    GLUCOSEU NEGATIVE 11/22/2011         Physical Exam:  Vitals:    06/02/19 0841   BP: 132/67   Pulse: 58   Resp: 20   Temp: 97.6 °F (36.4 °C)   SpO2: 96%        Intake/Output Summary (Last 24 hours) at 6/2/2019 1428  Last data filed at 6/2/2019 0558  Gross per 24 hour   Intake 2970.06 ml   Output 2275 ml   Net 695.06 ml      General:  No acute distress  Neck: Supple, no JVD, no carotid bruits  Heart: Regular rhythm normal S1 and S2, no rubs, murmurs or gallops  Lungs: clear to ascultation no rales, wheezes, or rhonchi  Abdomen: positive bowel sounds, soft, non-tender, non-distended, no bruits, no masses  Extremities:no clubbing, cyanosis or edema  Neurologic: alert and oriented x 3, cranial nerves 2-12 grossly intact, motor and sensory intact, moving all extremities  Skin: No rashes    Assessment:  Acute osteomyelitis of cervical spine (HCC)   Pre-operative CV exam  Possible upcoming Neurosurgery, unclear date (Intermediate to high risk surgery)  Intermediate risk patient  Patient complaints of ROBINS and intermittent chest discomfort. No cardiac issues on exams. Unable to do > 4 METS  Check Lexiscan and TTE prior to proceeding with procedure to ensure no major cardiac issues that would complicate or increase the cardiac risk of the surgery. Patient accepts the risk of the planned procedure and understands the possibility of manju-operative cardiac event, including but not limited to MI, VT/VF, cardiac arrest, and death, and wishes to proceed with the procedure. Thank you for allowing us to participate in the care of this patient. Please do not hesitate to call us with questions.     Electronically signed by Pallavi Duncan MD on 6/2/2019 at 2:28 PM    Interventional Cardiology - [facility]

## 2019-06-02 NOTE — PROGRESS NOTES
Hospitalist Progress Note    Patient:  Eladio Cordova      Unit/Bed:5K-14/014-A    YOB: 1935    MRN: [id number]       Acct: [de-identified]     PCP: Javy Barry    Date of Admission: 5/29/2019    Assessment/Plan:    80 y/o male w/ h/o HTN, CAD s/p stents and CABG, lipidemia, and hypothyroid is admitted for acute discites/OM and fluid collection.    1) Acute osteomyelitis of cervical spine: Likely will need cervical surgical intervention. Will consult cardiology for cardiac clearance given extensive h/o cardiac stents and CABG. MRI disclosed subtle hyperintense T2 signal and enhancement is noted within the C6-7 disc space. Marrow edema and enhancement is also noted within the adjacent C6 and C7 marrow spaces. This is suspicious for discitis/osteomyelitis  Stable exam noted. CT cervical spine pending. Followed by ID and neurosurgery. Inflammatory markers negative. Awaiting washout. Awaiting IR needle biopsy vs open biopsy.    2) H/o CAD: Extensive cardiac history. Plavix and ASA on hold. Unable to do > 4 METS  Lexiscan and TTE prior to surgery per cards.    3) HTN: SBP 120s  C/w norvasc, imdur, and lopressor. 4) ABELARDO on CKD: Baseline Cr 0.8  Cr 1.4  Supportive care. Expected discharge date:  Awaiting washout ASA and plavix.     Disposition:    [x] Home       [] TCU       [] Rehab       [] Psych       [] SNF       [] [facility]       [] Other-    Chief Complaint: Neck pain      Subjective (past 24 hours):   Persistent intermittent neck pain      Medications:  Reviewed    Infusion Medications    sodium chloride 75 mL/hr at 06/02/19 5739     Scheduled Medications    amLODIPine  5 mg Oral BID    [Held by provider] aspirin EC  81 mg Oral Daily    atorvastatin  40 mg Oral Nightly    isosorbide mononitrate  60 mg Oral Daily    levothyroxine  125 mcg Oral Daily    metoprolol tartrate  25 mg Oral BID    pantoprazole  40 mg Oral QAM AC    ranolazine  500 mg Oral BID  trimethoprim  100 mg Oral Daily    vitamin D  1,000 Units Oral Daily    sodium chloride flush  10 mL Intravenous 2 times per day    [Held by provider] enoxaparin  40 mg Subcutaneous Daily     PRN Meds: docusate sodium, nitroGLYCERIN, sodium chloride flush, ondansetron, potassium chloride **OR** potassium alternative oral replacement **OR** potassium chloride, magnesium sulfate, senna, acetaminophen      Intake/Output Summary (Last 24 hours) at 6/2/2019 1824  Last data filed at 6/2/2019 1359  Gross per 24 hour   Intake 3127.99 ml   Output 2275 ml   Net 852.99 ml       Diet:  DIET GENERAL; No Added Salt (3-4 GM); No Caffeine    Exam:  BP (!) 117/58   Pulse 60   Temp 97.5 °F (36.4 °C) (Oral)   Resp 18   Ht 5' 8\" (1.727 m)   Wt 179 lb 2 oz (81.3 kg)   SpO2 97%   BMI 27.24 kg/m²     General appearance: No apparent distress, appears stated age and cooperative. HEENT: Pupils equal, round, and reactive to light. Conjunctivae/corneas clear. Neck: Supple, with full range of motion. No jugular venous distention. Trachea midline. Respiratory:  Normal respiratory effort. Clear to auscultation, bilaterally without Rales/Wheezes/Rhonchi. Cardiovascular: Regular rate and rhythm with normal S1/S2 without murmurs, rubs or gallops. Abdomen: Soft, non-tender, non-distended with normal bowel sounds. Musculoskeletal: passive and active ROM x 4 extremities. Skin: Skin color, texture, turgor normal.  No rashes or lesions. Neurologic:  Neurovascularly intact without any focal sensory/motor deficits.  Cranial nerves: II-XII intact, grossly non-focal.  Psychiatric: Alert and oriented, thought content appropriate, normal insight  Capillary Refill: Brisk,< 3 seconds   Peripheral Pulses: +2 palpable, equal bilaterally       Labs:   Recent Labs     05/31/19  0529 06/02/19  1256   WBC 7.9 7.5   HGB 12.5* 13.1*   HCT 38.7* 39.2*    237     Recent Labs     05/31/19  0529 06/02/19  1256    139   K 4.2 4.4    are further discussed by level in the findings. Findings were conveyed to the patient's nurse, Jackie Dixon, at [phone] hours on 5/31/2019. **This report has been created using voice recognition software. It may contain minor errors which are inherent in voice recognition technology. **      Final report electronically signed by Dr. Brianna Ware on 5/31/2019 7:58 AM      MRI COMPARISON OF OUTSIDE FILMS   Final Result          DVT prophylaxis: [] Lovenox                                 [x] SCDs                                 [] SQ Heparin                                 [] Encourage ambulation           [] Already on Anticoagulation     Code Status: Full Code    PT/OT Eval Status: N/A    Tele:   [x] yes             [] no    Active Hospital Problems    Diagnosis Date Noted    Acute osteomyelitis of cervical spine ([facility] [address] [M46.22] 05/29/2019    Hyperlipidemia [E78.5]     Hypertension [I10]     CAD (coronary artery disease) [I25.10]     Personal history of DVT (deep vein thrombosis) [D22.146]        Electronically signed by Lacy Stringer MD on 6/2/2019 at 6:24 PM [Oropharynx] : normal oropharynx [Palate] : normal palate [Cardiac Auscultation] : normal cardiac auscultation  [Peripheral Pulses] : positive peripheral pulses [Respiratory Effort] : normal respiratory effort [Auscultation] : lungs clear to auscultation [Liver] : normal liver [Spleen] : normal spleen [Refer to Joint Diagram Below] : refer to joint diagram below [Grossly Intact] : grossly intact [4] : 4 [Normal] : normal [_______] : Knee: [unfilled] [Rash] : no rash [Ulcers] : no ulcers [Peripheral Edema] : no peripheral edema  [Tenderness] : non tender [Cervical] : no cervical adenopathy [de-identified] : interincisor distance 4 cm, slight atrophy R cheek vs L, slight jaw deviation to R, + mild R TMJ tenderness today [de-identified] : no SI joint tenderness [de-identified] : R Achilles, bilateral proximal and distal patella [de-identified] : 15 --> 22 cm

## 2019-06-03 ENCOUNTER — APPOINTMENT (OUTPATIENT)
Dept: NON INVASIVE DIAGNOSTICS | Age: 84
DRG: 472 | End: 2019-06-03
Attending: HOSPITALIST
Payer: MEDICARE

## 2019-06-03 ENCOUNTER — APPOINTMENT (OUTPATIENT)
Dept: GENERAL RADIOLOGY | Age: 84
DRG: 472 | End: 2019-06-03
Attending: HOSPITALIST
Payer: MEDICARE

## 2019-06-03 PROBLEM — N17.9 AKI (ACUTE KIDNEY INJURY) (HCC): Status: ACTIVE | Noted: 2019-06-03

## 2019-06-03 LAB
ABO: NORMAL
ANGLE TEG: 74.4 DEG (ref 53–72)
ANTIBODY SCREEN: NORMAL
APTT: 36.6 SECONDS (ref 22–38)
EPL-TEG: 0 % (ref 0–15)
HEPARIN THERAPY: NO
INR BLD: 0.99 (ref 0.85–1.13)
KINETICS TEG: 0.9 MINUTES (ref 1–3)
LV EF: 53 %
LVEF MODALITY: NORMAL
LY30 (LYSIS) TEG: 0 % (ref 0–7.5)
MA (MAX CLOT) TEG: 3.2 MM (ref 50–70)
REACTION TIME TEG: 3.8 MINUTES (ref 5–10)
RH FACTOR: NORMAL

## 2019-06-03 PROCEDURE — 93306 TTE W/DOPPLER COMPLETE: CPT

## 2019-06-03 PROCEDURE — 71046 X-RAY EXAM CHEST 2 VIEWS: CPT

## 2019-06-03 PROCEDURE — 85610 PROTHROMBIN TIME: CPT

## 2019-06-03 PROCEDURE — 93017 CV STRESS TEST TRACING ONLY: CPT | Performed by: INTERNAL MEDICINE

## 2019-06-03 PROCEDURE — 6370000000 HC RX 637 (ALT 250 FOR IP): Performed by: HOSPITALIST

## 2019-06-03 PROCEDURE — 99233 SBSQ HOSP IP/OBS HIGH 50: CPT | Performed by: NEUROLOGICAL SURGERY

## 2019-06-03 PROCEDURE — 3430000000 HC RX DIAGNOSTIC RADIOPHARMACEUTICAL: Performed by: INTERNAL MEDICINE

## 2019-06-03 PROCEDURE — 36415 COLL VENOUS BLD VENIPUNCTURE: CPT

## 2019-06-03 PROCEDURE — 2709999900 HC NON-CHARGEABLE SUPPLY

## 2019-06-03 PROCEDURE — 86850 RBC ANTIBODY SCREEN: CPT

## 2019-06-03 PROCEDURE — 99232 SBSQ HOSP IP/OBS MODERATE 35: CPT | Performed by: NURSE PRACTITIONER

## 2019-06-03 PROCEDURE — 86901 BLOOD TYPING SEROLOGIC RH(D): CPT

## 2019-06-03 PROCEDURE — 85730 THROMBOPLASTIN TIME PARTIAL: CPT

## 2019-06-03 PROCEDURE — 85347 COAGULATION TIME ACTIVATED: CPT

## 2019-06-03 PROCEDURE — 78452 HT MUSCLE IMAGE SPECT MULT: CPT

## 2019-06-03 PROCEDURE — 86900 BLOOD TYPING SEROLOGIC ABO: CPT

## 2019-06-03 PROCEDURE — 2580000003 HC RX 258: Performed by: NURSE PRACTITIONER

## 2019-06-03 PROCEDURE — 6360000002 HC RX W HCPCS

## 2019-06-03 PROCEDURE — 2580000003 HC RX 258: Performed by: HOSPITALIST

## 2019-06-03 PROCEDURE — A9500 TC99M SESTAMIBI: HCPCS | Performed by: INTERNAL MEDICINE

## 2019-06-03 PROCEDURE — 1200000000 HC SEMI PRIVATE

## 2019-06-03 PROCEDURE — 99233 SBSQ HOSP IP/OBS HIGH 50: CPT | Performed by: INTERNAL MEDICINE

## 2019-06-03 RX ORDER — LIDOCAINE 4 G/G
1 PATCH TOPICAL DAILY
Status: DISCONTINUED | OUTPATIENT
Start: 2019-06-03 | End: 2019-06-07 | Stop reason: HOSPADM

## 2019-06-03 RX ORDER — LIDOCAINE 4 G/G
2 PATCH TOPICAL DAILY
Status: DISCONTINUED | OUTPATIENT
Start: 2019-06-03 | End: 2019-06-07 | Stop reason: HOSPADM

## 2019-06-03 RX ORDER — LIDOCAINE 4 G/G
1 PATCH TOPICAL DAILY
Status: DISCONTINUED | OUTPATIENT
Start: 2019-06-03 | End: 2019-06-03 | Stop reason: SDUPTHER

## 2019-06-03 RX ORDER — LIDOCAINE 4 G/G
3 PATCH TOPICAL DAILY
Status: DISCONTINUED | OUTPATIENT
Start: 2019-06-03 | End: 2019-06-07 | Stop reason: HOSPADM

## 2019-06-03 RX ORDER — SODIUM CHLORIDE 450 MG/100ML
INJECTION, SOLUTION INTRAVENOUS CONTINUOUS
Status: DISCONTINUED | OUTPATIENT
Start: 2019-06-03 | End: 2019-06-05

## 2019-06-03 RX ADMIN — METOPROLOL TARTRATE 25 MG: 25 TABLET ORAL at 20:22

## 2019-06-03 RX ADMIN — VITAMIN D, TAB 1000IU (100/BT) 1000 UNITS: 25 TAB at 11:44

## 2019-06-03 RX ADMIN — ATORVASTATIN CALCIUM 40 MG: 40 TABLET, FILM COATED ORAL at 20:22

## 2019-06-03 RX ADMIN — RANOLAZINE 500 MG: 500 TABLET, FILM COATED, EXTENDED RELEASE ORAL at 11:44

## 2019-06-03 RX ADMIN — ACETAMINOPHEN 650 MG: 325 TABLET ORAL at 20:22

## 2019-06-03 RX ADMIN — AMLODIPINE BESYLATE 5 MG: 5 TABLET ORAL at 11:44

## 2019-06-03 RX ADMIN — Medication 10.5 MILLICURIE: at 09:30

## 2019-06-03 RX ADMIN — METOPROLOL TARTRATE 25 MG: 25 TABLET ORAL at 11:44

## 2019-06-03 RX ADMIN — AMLODIPINE BESYLATE 5 MG: 5 TABLET ORAL at 20:22

## 2019-06-03 RX ADMIN — ACETAMINOPHEN 650 MG: 325 TABLET ORAL at 07:16

## 2019-06-03 RX ADMIN — SODIUM CHLORIDE: 9 INJECTION, SOLUTION INTRAVENOUS at 05:13

## 2019-06-03 RX ADMIN — RANOLAZINE 500 MG: 500 TABLET, FILM COATED, EXTENDED RELEASE ORAL at 20:22

## 2019-06-03 RX ADMIN — ACETAMINOPHEN 650 MG: 325 TABLET ORAL at 15:40

## 2019-06-03 RX ADMIN — ISOSORBIDE MONONITRATE 60 MG: 60 TABLET ORAL at 11:44

## 2019-06-03 RX ADMIN — Medication 10 ML: at 11:45

## 2019-06-03 RX ADMIN — SODIUM CHLORIDE: 4.5 INJECTION, SOLUTION INTRAVENOUS at 15:42

## 2019-06-03 RX ADMIN — Medication 35 MILLICURIE: at 10:34

## 2019-06-03 RX ADMIN — TRIMETHOPRIM 100 MG: 100 TABLET ORAL at 11:45

## 2019-06-03 ASSESSMENT — PAIN SCALES - GENERAL
PAINLEVEL_OUTOF10: 0
PAINLEVEL_OUTOF10: 3
PAINLEVEL_OUTOF10: 7
PAINLEVEL_OUTOF10: 3
PAINLEVEL_OUTOF10: 2
PAINLEVEL_OUTOF10: 3

## 2019-06-03 ASSESSMENT — ENCOUNTER SYMPTOMS
CHEST TIGHTNESS: 0
TROUBLE SWALLOWING: 1
ABDOMINAL PAIN: 0

## 2019-06-03 NOTE — PROGRESS NOTES
Patient was seen and examined in the floor. There was no acute event over the night. Patient stated stated that his neck pain is better but sometimes it is up to 8/10. Also he complained from episodes of pain in his arms that stop the elbow levels. Today physical exam showed no changes comparing with the yesterday neurological exam ( still there is no neurological no neurological deficit). C-spine CT showed: Endplate erosions and mild widening of the anterior disc space at the C6-7 level. This in conjunction with the findings on MRI can represent discitis/osteomyelitis. There is mild thickening of the prevertebral soft tissues at this level. C-spine MRI showed:     1. Subtle hyperintense T2 signal and enhancement is noted within the C6-7 disc space. Marrow edema and enhancement is also noted within the adjacent C6 and C7 marrow spaces. This is suspicious for discitis/osteomyelitis. There is also a small,    peripherally enhancing fluid collection extending from C6 to T1 which is suspicious for a small epidural abscess.       2. Soft tissue swelling and enhancement is noted within the prevertebral soft tissues surrounding the C6-7 level which is suspicious for infectious inflammatory changes. No defined fluid collection is identified. Note is also made of an area of signal    loss adjacent to the esophagus along the C5-6 level which may correspond to a gas-filled esophageal diverticulum. A/P:    - This is an 81year old male with a progressive history of neck pain who underwent C-spine MRI that showed findings suggestive of cervical osteomyelitis. -  There is no neurological deficit and this time.   - To now all the lab results on the blood cultures are inconclusive.  - IR team stated that it is difficult to then to appear from [facility] CT guided biopsy from patient cervical spine Lesion.  - Today, I had long discussion with patient and his family,  I stated to them that in the face of uncertainty regarding the pathology process that is  going on in patient C-spine, and in the face the technical difficultly of CT-guided biopsy and the negative lab results so far. And given that fact that osteomyelitis of the cervical spine can follow a much more dramatic and rapidly deteriorating process, leading to early neurologic deficit, I recommended for patient at this time a more aggressive approach in the form of surgical intervention ( mainly, a biopsy plus ACDF at the level of C6-C7). I emphasized for the patient that he may also need another surgical intervention from posterior aspect of his cervical spine based on the progression of his symptoms.    -  I explained for patient  And his family the recommended surgical intervention and the alternative treatment options in detail ( in front our neurosurgery PA, Florinda Esteban),  as well as the associated risks and benefits. I discussed the possible complications of surgery with patient  And his family in detail,  including excessive blood loss requiring transfusion, infection that may become meningitis, problem with heart lung and kidney as a result of general anesthesia such as heart attack, pneumonia, kidney shutdown and stroke. I also discussed the possible complication of the operations in and around the spine such as, death, paralysis, weakness on one side or the other of the body, decreased sensation or pain on one side or the other of the body, inability to control bowel/bladder, problems with sexual function, postoperative meningitis, postoperative development of a blood clot that may require another operation, leakage of fluid from the wound that may require another operation. . The patient understands that no guarantee can be made regarding the extent of post-operative pain and per op  symptoms relief. - All question and concerns were addressed and answered.    - Patient elected to proceed with recommended surgical intervention.   - The

## 2019-06-03 NOTE — H&P
[facility]  Sedation/Analgesia History & Physical    Patient: Kevyn Kin: 1935  Med Rec#: [id number] Acc#: [id number]   Provider Performing Procedure: Prema Ha MD  Primary Care Physician: Dereck Sullivan    PRE-PROCEDURE   Brief History/Pre-Procedure Diagnosis:The patient is a 81 y.o.,  male with significant past medical history of Zenker's diverticulum now with osteomyelitis. Going to surgery with Dr. Dianne Martinez later today. MEDICAL HISTORY  [x]CAD/Valve  []Liver Disease  [x]Lung Disease []Diabetes  []Hypertension []Renal Disease  []Additional information:       has a past medical history of CAD (coronary artery disease), Cancer ([facility] [address] Hyperlipidemia, Hypertension, Personal history of DVT (deep vein thrombosis), and Transverse myelitis ([facility] [address] SURGICAL HISTORY   has a past surgical history that includes Appendectomy (1284); Tonsillectomy and adenoidectomy (1942); Finger amputation (1958); cervical fusion (4383-9265); back surgery (1995 x2, 1996, 1997, 2010); angioplasty (1991, 1992, 1993, [address], 2000, 2004); Cardiac catheterization (2001, 2006, 2009); Coronary angioplasty with stent (9196-9916); and shoulder surgery.   Additional information:       ALLERGIES   Allergies as of 05/29/2019 - Review Complete 05/29/2019   Allergen Reaction Noted    Lopid [gemfibrozil] Other (See Comments) 05/29/2019    Zocor [simvastatin] Other (See Comments) 05/29/2019    Flomax [tamsulosin hcl] Other (See Comments) 05/29/2019    Levaquin [levofloxacin] Other (See Comments) 05/29/2019    Amoxicillin-pot clavulanate Rash 11/30/2011     Additional information:       MEDICATIONS       Current Facility-Administered Medications:     lidocaine 4 % external patch 1 patch, 1 patch, Transdermal, Daily **OR** lidocaine 4 % external patch 2 patch, 2 patch, Transdermal, Daily **OR** lidocaine 4 % external patch 3 patch, 3 patch, Transdermal, Daily, Valentino Jesus, MD    0.45 % sodium chloride DO    magnesium sulfate 1 g in dextrose 5 % 100 mL IVPB, 1 g, Intravenous, PRN, Mirna Osgood, DO    senna (SENOKOT) tablet 8.6 mg, 1 tablet, Oral, Daily PRN, Mirna Osgood, DO    [Held by provider] enoxaparin (LOVENOX) injection 40 mg, 40 mg, Subcutaneous, Daily, Mirna Osgood, DO, 40 mg at 05/29/19 2032    acetaminophen (TYLENOL) tablet 650 mg, 650 mg, Oral, Q6H PRN, Mirna Osgood, DO, 650 mg at 06/03/19 0716  Prior to Admission medications    Medication Sig Start Date End Date Taking? Authorizing Provider   levothyroxine (SYNTHROID) 125 MCG tablet Take 125 mcg by mouth Daily 1/2 tablet on empty stomach   Yes Historical Provider, MD   vitamin D (CHOLECALCIFEROL) 1000 UNIT TABS tablet Take 1,000 Units by mouth daily   Yes Historical Provider, MD   isosorbide mononitrate (IMDUR) 60 MG extended release tablet Take 60 mg by mouth daily   Yes Historical Provider, MD   trimethoprim (TRIMPEX) 100 MG tablet Take 100 mg by mouth daily   Yes Historical Provider, MD   atorvastatin (LIPITOR) 40 MG tablet Take 40 mg by mouth nightly   Yes Historical Provider, MD   ranolazine (RANEXA) 500 MG extended release tablet Take 500 mg by mouth 2 times daily   Yes Historical Provider, MD   pantoprazole sodium (PROTONIX) 40 MG PACK packet Take 40 mg by mouth every morning (before breakfast)   Yes Historical Provider, MD   clopidogrel (PLAVIX) 75 MG tablet Take 75 mg by mouth daily. Yes Historical Provider, MD   metoprolol (LOPRESSOR) 50 MG tablet Take 25 mg by mouth 2 times daily. Yes Historical Provider, MD   aspirin EC 81 MG EC tablet Take 81 mg by mouth daily. Yes Historical Provider, MD   amlodipine (NORVASC) 10 MG tablet Take 5 mg by mouth 2 times daily. Yes Historical Provider, MD   acetaminophen (TYLENOL) 500 MG tablet Take 500 mg by mouth every 6 hours as needed.      Yes Historical Provider, MD   docusate sodium (COLACE) 100 MG capsule Take 100 mg by mouth 2 times daily as needed    Yes Historical Provider, MD nitroGLYCERIN (NITROSTAT) 0.4 MG SL tablet Place 0.4 mg under the tongue every 5 minutes as needed. Historical Provider, MD     Additional information:       PHYSICAL:    height is 5' 8\" (1.727 m) and weight is 179 lb 2 oz (81.3 kg). His temperature is 97.6 °F (36.4 °C). His blood pressure is 127/62 and his pulse is 58. His respiration is 16 and oxygen saturation is 93%. Heart:  [x]Regular rate and rhythm  []Other:    Lungs:  []Clear    [x]Other: decreased breath sound bilateral.     Abdomen: [x]Soft    []Other:    Mental Status: [x]Alert & Oriented  []Other:      VITAL SIGNS   Patient Vitals for the past 24 hrs:   BP Temp Temp src Pulse Resp SpO2   06/03/19 1140 -- 97.6 °F (36.4 °C) -- -- -- --   06/03/19 0015 127/62 97.8 °F (36.6 °C) Oral 58 16 93 %   06/02/19 1945 131/63 98.6 °F (37 °C) Oral 66 18 97 %   06/02/19 1600 (!) 117/58 97.5 °F (36.4 °C) Oral 60 18 97 %       PLANNED PROCEDURE   [x]EGD  []Colonoscopy []Flex Sigmoid  []ERCP []EUS   []Cystoscopy  [] CATH [] BRONCH   Consent: I have discussed with the patient and/or the patient representative the indication, alternatives, and the possible risks and/or complications of the planned procedure and the anesthesia methods. The patient and/or patient representative appear to understand and agree to proceed. SEDATION ( [facility]CS ANESTHESIA)    Planned agent:[x]Midazolam []Meperidine [x]Sublimaze []Morphine  []Diazepam  []Other:         ASA Classification: Class 3 - A patient with severe systemic disease that limits activity but is not incapacitating    Airway Assessment: normal    Monitoring and Safety: The patient will be placed on a cardiac monitor and vital signs, pulse oximetry and level of consciousness will be continuously evaluated throughout the procedure. The patient will be closely monitored until recovery from the medications is complete and the patient has returned to baseline status.   Respiratory therapy will be on standby during the procedure. [x]Pre-procedure diagnostic studies complete and results available. Comment:    [x]Previous sedation/anesthesia experiences assessed. Comment:    [x]The patient is an appropriate candidate to undergo the planned procedure sedation and anesthesia. (Refer to nursing sedation/analgesia documentation record)  [x]Formulation and discussion of sedation/procedure plan, risks, and expectations with patient and/or responsible adult completed. [x]Patient examined immediately prior to the procedure.  (Refer to nursing sedation/analgesia documentation record)    Loco Hart MD   Electronically signed 6/3/2019 at 3:28 PM

## 2019-06-03 NOTE — PROGRESS NOTES
mass, no thyromegaly   Musculoskeletal: normal range of motion, no joint swelling, deformity or tenderness  Neurological: alert, oriented, normal speech, no focal findings or movement disorder noted    Medications:    lidocaine  1 patch Transdermal Daily    Or    lidocaine  2 patch Transdermal Daily    Or    lidocaine  3 patch Transdermal Daily    amLODIPine  5 mg Oral BID    [Held by provider] aspirin EC  81 mg Oral Daily    atorvastatin  40 mg Oral Nightly    isosorbide mononitrate  60 mg Oral Daily    levothyroxine  125 mcg Oral Daily    metoprolol tartrate  25 mg Oral BID    pantoprazole  40 mg Oral QAM AC    ranolazine  500 mg Oral BID    trimethoprim  100 mg Oral Daily    vitamin D  1,000 Units Oral Daily    sodium chloride flush  10 mL Intravenous 2 times per day    [Held by provider] enoxaparin  40 mg Subcutaneous Daily         docusate sodium 100 mg BID PRN   nitroGLYCERIN 0.4 mg Q5 Min PRN   sodium chloride flush 10 mL PRN   ondansetron 4 mg Q6H PRN   potassium chloride 40 mEq PRN   Or     potassium alternative oral replacement 40 mEq PRN   Or     potassium chloride 10 mEq PRN   magnesium sulfate 1 g PRN   senna 1 tablet Daily PRN   acetaminophen 650 mg Q6H PRN       Diagnostics:      Echo: Completed, results pending    Stress: completed, results pending    Left Heart Cath per Dr. Lala Garza EMR note:  On March 14, 2017, cath findings revealed a patent LIMA to  the LAD, patent Y graft feeding the PDA and PL branches, a patent  diagonal branch, which terminated into 2 diagonal branches. There was  an OM3 lesion, but it was less than 2 mm calcified and medical therapy  was recommended. EF was 45%    Lab Data:    Cardiac Enzymes:  No results for input(s): CKTOTAL, CKMB, CKMBINDEX, TROPONINI in the last 72 hours.     CBC:   Lab Results   Component Value Date    WBC 7.5 06/02/2019    RBC 4.00 06/02/2019    RBC 4.40 11/22/2011    HGB 13.1 06/02/2019    HCT 39.2 06/02/2019     06/02/2019 CMP:  Lab Results   Component Value Date     06/02/2019    K 4.4 06/02/2019    K 5.1 05/30/2019     06/02/2019    CO2 24 06/02/2019    BUN 18 06/02/2019    CREATININE 1.1 06/02/2019    LABGLOM 64 06/02/2019    GLUCOSE 154 06/02/2019    GLUCOSE 106 12/11/2011    CALCIUM 8.8 06/02/2019       Hepatic Function Panel:  Lab Results   Component Value Date    ALKPHOS 79 05/30/2019    ALT 32 05/30/2019    AST 22 05/30/2019    PROT 6.5 05/30/2019    BILITOT 0.5 05/30/2019    LABALBU 3.5 05/30/2019       Magnesium:    Lab Results   Component Value Date    MG 1.9 05/31/2019       PT/INR:  No results found for: PROTIME, INR    HgBA1c:  No results found for: LABA1C    FLP:  No results found for: TRIG, HDL, LDLCALC, LDLDIRECT, LABVLDL    TSH:  No results found for: TSH      Assessment:  · Acute osteomyelitis of cervical spine: possible upcoming neurosurgery  · CAD: H/o CABG, multiple PCI's   [facility] 3/4/2017: On March 14, 2017, cath findings revealed a patent LIMA to the LAD, patent Y graft feeding the PDA and PL branches, a patent diagonal branch, which terminated into 2 diagonal branches. There was an OM3 lesion, but it was less than 2 mm calcified and medical therapy was recommended. · EF 45 per [facility] 2017  · No current chest pain or sob, reports Intermittent ROBINS and chest discomfort with 1 flight of stairs   Unable to do > 4 METS   · H/o DVT    Plan:  · Stress test and echo completed today, results pending  · Intermediate to high risk surgery   Patient accepts the risk of the planned procedure and understands the possibility of manju-operative cardiac event, including but not limited to MI, VT/VF, cardiac arrest, and death, and wishes to proceed with the procedure.   · Continue BB, statin, imdur, ranexa           Electronically signed by NINFA Davis CNP on 6/3/2019 at 1:28 PM

## 2019-06-03 NOTE — CONSULTS
[id number] [facility] CONSULT      Patient: Jorgito Pena  : 1935  Acct#: [de-identified]  Consult seen for:   Jorgito Pena is a 81 y.o. , male , wanting us to rule out esophageal abscess (sent due to cervical osteomyelitis C6-7)       ASSESSMENT:     1. Chronic dysphagia; x 3 years, was already seen by Shawn Luo CNP at our office in January. Pt was supposed to get EGD but rescheduled the first time and then the second time they cancelled. It was never rescheduled. They never returned for follow up. 2. Slight anemia; 13.1/39.2  3. Admitted with cervical spine osteomyelitis   4. CAD with previous MI   5. Depression   6. Kake; has hearing aids  7. HTN  8. Neurogenic bladder   9. Pulmonary fibrosis   10. Hx PE   11. Zenker's diverticulum (already repaired twice)  12. CABG x 6 vessel           PLAN:   1. Esophagram 2019 basically unchanged from 2017 per Shawn Luo CNP notes (office in January). He had a distorted and saccular contour of the right sided cervical esophageal lumen and dysmotility. May be related to residual paris anika diverticulum and post changes. 2. Last Plavix 19 -and asa 19  3. Protonix 40 mg daily   4. Supposed to have cervical spine disc bx of C6-7,  tomorrow. 5. MRI cervical spine report noted; see report below  6. Lovenox already discontinued. Plavix and asa on hold   7. Follow up with DR Kern or Lesley ORTIZ in 2-3 weeks  8. CXR ordered (wife stated infiltrates in bases with recent CXR)   9. Dr Bonita Day talked with DR Jake Christopher; plan for EGD as requested by DR Macias;  tom[facility] at [facility] ; ordered  8. NPO at midnight  11. Stress Test done today, do NOT have results as of yet. 12. Will follow        MRI    Impression       1. Subtle hyperintense T2 signal and enhancement is noted within the C6-7 disc space. Marrow edema and enhancement is also noted within the adjacent C6 and C7 marrow spaces. This is suspicious for discitis/osteomyelitis.  There is also a IONCA in the last 72 hours. Magnesium:No results for input(s): MG in the last 72 hours. Phosphorus:No results for input(s): PHOS in the last 72 hours. Troponin: No results for input(s): CKTOTAL, CKMB, TROPONINI in the last 72 hours. PT/INR:   No results for input(s): PROTIME, INR in the last 72 hours. REVIEW OF SYSTEMS:    GENERAL:  No fever, chills or weight loss. EYES:  No  blurred vision, double vision  CARDIOVASCULAR:  No chest pain or palpitations. Stress test done today   RESPIRATORY:  No dyspnea or cough. GI: dysphagia, chronic   MUSCULOSKELETAL:  + neck pain /arm pain  :   No dysuria or hematuria. SKIN:  No rashes or jaundice. NEUROLOGIC:   No headaches or  seizures,  numbness or tingling of arms, or legs. PSYCH:  No anxiety or depression. ENDOCRINE:   No polyuria or polydipsia. BLOOD:  No anemia, bleeding disorder, blood or blood product transfusion. PHYSICAL EXAMINATION:      Vitals:    06/03/19 1140   BP:    Pulse:    Resp:    Temp: 97.6 °F (36.4 °C)   SpO2:      GEN: Well nourished, well developed. LUNGS:  Clear to auscultation bilaterally. Chest rises equally on inspiration. CARDIOVASCULAR:  Regular rate and rhythm without murmurs, rubs or gallops. ABDOMEN:  Soft, nontender and nondistended with normal bowel sounds. EYES: ELSA. ENT:  Ears symmetric, Neck supple, trachea midline, moist mucous membranes     EXTREMITIES:  No cyanosis, clubbing, or edema. DERM:  No rash or jaundice. NEURO:  Alert and oriented x4. Patient moves all extremities and has gross sensation in all extremities. PSYCHIATRIC: calm, pleasant    HOME MEDICATIONS      Prior to Admission medications    Medication Sig Start Date End Date Taking?  Authorizing Provider   levothyroxine (SYNTHROID) 125 MCG tablet Take 125 mcg by mouth Daily 1/2 tablet on empty stomach   Yes Historical Provider, MD   vitamin D (CHOLECALCIFEROL) 1000 UNIT TABS tablet Take 1,000 Units by mouth daily Yes Historical Provider, MD   isosorbide mononitrate (IMDUR) 60 MG extended release tablet Take 60 mg by mouth daily   Yes Historical Provider, MD   trimethoprim (TRIMPEX) 100 MG tablet Take 100 mg by mouth daily   Yes Historical Provider, MD   atorvastatin (LIPITOR) 40 MG tablet Take 40 mg by mouth nightly   Yes Historical Provider, MD   ranolazine (RANEXA) 500 MG extended release tablet Take 500 mg by mouth 2 times daily   Yes Historical Provider, MD   pantoprazole sodium (PROTONIX) 40 MG PACK packet Take 40 mg by mouth every morning (before breakfast)   Yes Historical Provider, MD   clopidogrel (PLAVIX) 75 MG tablet Take 75 mg by mouth daily. Yes Historical Provider, MD   metoprolol (LOPRESSOR) 50 MG tablet Take 25 mg by mouth 2 times daily. Yes Historical Provider, MD   aspirin EC 81 MG EC tablet Take 81 mg by mouth daily. Yes Historical Provider, MD   amlodipine (NORVASC) 10 MG tablet Take 5 mg by mouth 2 times daily. Yes Historical Provider, MD   acetaminophen (TYLENOL) 500 MG tablet Take 500 mg by mouth every 6 hours as needed. Yes Historical Provider, MD   docusate sodium (COLACE) 100 MG capsule Take 100 mg by mouth 2 times daily as needed    Yes Historical Provider, MD   nitroGLYCERIN (NITROSTAT) 0.4 MG SL tablet Place 0.4 mg under the tongue every 5 minutes as needed.       Historical Provider, MD       MEDICATIONS    Scheduled Meds:   lidocaine  1 patch Transdermal Daily    Or    lidocaine  2 patch Transdermal Daily    Or    lidocaine  3 patch Transdermal Daily    amLODIPine  5 mg Oral BID    [Held by provider] aspirin EC  81 mg Oral Daily    atorvastatin  40 mg Oral Nightly    isosorbide mononitrate  60 mg Oral Daily    levothyroxine  125 mcg Oral Daily    metoprolol tartrate  25 mg Oral BID    pantoprazole  40 mg Oral QAM AC    ranolazine  500 mg Oral BID    trimethoprim  100 mg Oral Daily    vitamin D  1,000 Units Oral Daily    sodium chloride flush  10 mL Intravenous 2 times per day   [facility] by provider] enoxaparin  40 mg Subcutaneous Daily     Continuous Infusions:  PRN Meds:.docusate sodium, nitroGLYCERIN, sodium chloride flush, ondansetron, potassium chloride **OR** potassium alternative oral replacement **OR** potassium chloride, magnesium sulfate, senna, acetaminophen    ALLERGIES   is allergic to lopid [gemfibrozil]; zocor [simvastatin]; flomax [tamsulosin hcl]; levaquin [levofloxacin]; and amoxicillin-pot clavulanate. SOCIAL HISTORY    Social History  Social History     Socioeconomic History    Marital status:      Spouse name: None    Number of children: None    Years of education: None    Highest education level: None   Occupational History    Occupation: farmer   Social Needs    Financial resource strain: None    Food insecurity:     Worry: None     Inability: None    Transportation needs:     Medical: None     Non-medical: None   Tobacco Use    Smoking status: Former Smoker    Smokeless tobacco: Former User     Quit date: 1/1/1967   Substance and Sexual Activity    Alcohol use: Yes    Drug use: No    Sexual activity: None   Lifestyle    Physical activity:     Days per week: None     Minutes per session: None    Stress: None   Relationships    Social connections:     Talks on phone: None     Gets together: None     Attends Mandaen service: None     Active member of club or organization: None     Attends meetings of clubs or organizations: None     Relationship status: None    Intimate partner violence:     Fear of current or ex partner: None     Emotionally abused: None     Physically abused: None     Forced sexual activity: None   Other Topics Concern    None   Social History Narrative    None       FAMILY HISTORY    family history includes Diabetes in his mother; Heart Disease in his father; Stroke in his mother.          REVIEW OF DIAGNOSTIC TESTING AND LABS:      Hospitalist/Attending provider notes, consulting physician notes, laboratory results and procedure notes reviewed prior to seeing the patient.    Note done in collaboration with DR Veena Cabrera MD  Electronically signed by NINFA Cortes CNP on 6/3/19 at 2:13 PM

## 2019-06-03 NOTE — PROCEDURES
[address] [address] Endoscopy     EGD Report    Patient: Morenita Gramajo  : 1935  Acct#: [de-identified]     BRIEF HISTORY AND INDICATIONS:    The patient is a 81 y.o.,  male with significant past medical history of  Continued Zenker's diverticulum now with cervical  osteomyelitis. Going to surgery with Dr. Tegan Watts later today. Pt. Had prior repair of Zenker's x 2 , with continued dysphagia. Indication for the EGD today is to make sure there is no communication of the Esophagus and the Osteomyelitis. Plavix has been held for the procedure. Surgeon who did diverticulum repair - is at [address] [place] [address]. Pt. Had seen Job Mcdaniel CNP early this year but was unable to have the EGD done sooner because patient was having issues with bronchitis. Wife's name is Purcell Kanner. David Sherman PREMEDICATION:     Lidocaine gargle to the oropharynx,   Fentanyl 75 mcg IV,  Versed 2.5 mg IV. IVCS start time: 5099  IVCS stop time:  0650  Transfer to recovery:  0655       CO2 used for air insufflation during this procedure. INSTRUMENT:  Olympus GIF H-180 gastroscope  and pediatric EGD scope    The risks and benefits of upper endoscopy with biopsy and dilation were  described to the patient, including but not limited to bleeding, infection, poking a hole someplace requiring surgery to fix it, having reaction to medication, and death. The patient understood these risks and provided informed consent. The patient was placed in the left lateral decubitus position. Conscious sedation was administered . The patient was continuously monitored to ensure adequate sedation and patient safety. A forward-viewing Olympus endoscope was lubricated and inserted through the mouth into the oropharynx. Under direct visualization, the upper esophagus was intubated. The scope was advanced to the esophagus and stomach to second portion of duodenum. Scope was slowly withdrawn with good views of mucosal surfaces. The scope was retroflexed in the fundus. Findings and maneuvers are listed in impression below. The patient tolerated the procedure well. The scope was removed. The patient was removed to the recovery area. There were no immediate complications. ESOPHAGUS:  Zenker's pouch at the UES. No sign of infection noted . Otherwise, normal exam.      STOMACH:  Normal.        DUODENUM:  The pylorus was normal and the duodenal bulb distended well and  appeared normal.  Post bulbar area was well-visualized and was normal.     DILATION:  A Dilation was NOT performed today as patient is getting surgery for neck osteomyelitis. Due to the risks of dilation. IMPRESSION:      1. Zenker's diverticulum pouch without sign of infection or fistula . Underlying dysmotility. 2.   Dilation NOT performed. .      3. Otherwise, normal exam to the second portion of the duodenum . RECOMMENDATIONS:    1. Call GI prn.   2. Proceed with Surgery today with Dr. Amy Benavides for osteomyelitis. 3. Pt. May follow up as an outpatient with Dr. Yue Vidales for dilation .        Specimens: were not obtained    (The following sections must be completed)  Post-Sedation Vital Signs: Vital signs were reviewed and were stable after the procedure (see flow sheet for vitals)            Post-Sedation Exam: Lungs: clear to auscultation bilaterally and Cardiovascular: regular rate and rhythm           Complications: none        Alicia Burris MD, [facility]

## 2019-06-03 NOTE — PROGRESS NOTES
Hospitalist Progress Note    Patient:  Ramirez Almazan      Unit/Bed:5K-14/014-A    YOB: 1935    MRN: [id number]       Acct: [de-identified]     PCP: Chano Yee    Date of Admission: 5/29/2019    Reason for admission: 3 wk hx of pain neck, back, sudden onset. Comes and goes. Difficult to turn neck. Is severe. Pt had zenobia on admission, now resolved    Expected discharge date:  ? Disposition: Home    Hospital Course: Pt presented with above  Outpt MRI suggested the possibility of osteomyelitis. Await bx. To have stress test today     In the Last 24 hours: No new events in the past 24 hours. Medications:  Reviewed    Infusion Medications   Scheduled Medications    amLODIPine  5 mg Oral BID    [Held by provider] aspirin EC  81 mg Oral Daily    atorvastatin  40 mg Oral Nightly    isosorbide mononitrate  60 mg Oral Daily    levothyroxine  125 mcg Oral Daily    metoprolol tartrate  25 mg Oral BID    pantoprazole  40 mg Oral QAM AC    ranolazine  500 mg Oral BID    trimethoprim  100 mg Oral Daily    vitamin D  1,000 Units Oral Daily    sodium chloride flush  10 mL Intravenous 2 times per day    [Held by provider] enoxaparin  40 mg Subcutaneous Daily     PRN Meds: docusate sodium, nitroGLYCERIN, sodium chloride flush, ondansetron, potassium chloride **OR** potassium alternative oral replacement **OR** potassium chloride, magnesium sulfate, senna, acetaminophen      Intake/Output Summary (Last 24 hours) at 6/3/2019 0715  Last data filed at 6/3/2019 0456  Gross per 24 hour   Intake 2553.24 ml   Output 2000 ml   Net 553.24 ml       Diet:  DIET GENERAL; No Added Salt (3-4 GM);  No Caffeine    Exam:  /62   Pulse 58   Temp 97.8 °F (36.6 °C) (Oral)   Resp 16   Ht 5' 8\" (1.727 m)   Wt 179 lb 2 oz (81.3 kg)   SpO2 93%   BMI 27.24 kg/m²     Physical Examination: General appearance - alert, well appearing, and in no distress  Mental status - alert, oriented to person, greater than left. This is most noted in the anterior disc space. This corresponds to the area of edema on CT. This can are present osteomyelitis. The cortex of the superior endplate of C7 is indistinct. These findings can represent osteomyelitis. There is a solid fusion across the discs at the C4-C6 levels. There is 4 mm of anterolisthesis of C3 on C4. This is due to facet degenerative changes. The facet joints at this level are fused. There is some mild prevertebral soft tissue swelling anterior to the C6-7 level. There is some gas density seen in the esophagus. In the cervical soft tissues, vascular calcifications are noted. There are calcified pleural plaques bilaterally in the lung apices. There are no suspicious findings in the lung apices. 1. Endplate erosions and mild widening of the anterior disc space at the C6-7 level. This in conjunction with the findings on MRI can represent discitis/osteomyelitis. There is mild thickening of the prevertebral soft tissues at this level. **This report has been created using voice recognition software. It may contain minor errors which are inherent in voice recognition technology. ** Final report electronically signed by Dr. Shyann Arevalo on 6/2/2019 8:29 AM    Mri Cervical Spine W Wo Contrast    Result Date: 5/31/2019  PROCEDURE: MRI CERVICAL SPINE W WO CONTRAST CLINICAL INFORMATION: NECK PAIN, CERVICAL SPINE, mri on 5/28 showing possible cervical spine OM/discitis. COMPARISON: None available. Correlation is made to MRI of the cervical spine dated May 28, 2019 and performed at [facility]. TECHNIQUE: Sagittal T1, T2 and STIR sequences were obtained through the cervical spine. Axial fast and echo and gradient echo T2-weighted images were obtained. Postcontrast axial and sagittal T1-weighted images were obtained.  Postcontrast images were obtained after administration of 15 mL ProHance intravenous contrast. FINDINGS: The cervical spine is imaged from the artery disease) [I25.10]     Personal history of DVT (deep vein thrombosis) [Z86.555]        1. Acute cervical/thoracic pain. To have bx am  2. Cad, s/p cabg.   Yaz Ferrari feels he needs a stress tesst.  3. hbp- controlled        Electronically signed by Jennifer Allen MD on 6/3/2019 at 7:15 AM

## 2019-06-04 ENCOUNTER — ANESTHESIA EVENT (OUTPATIENT)
Dept: OPERATING ROOM | Age: 84
DRG: 472 | End: 2019-06-04
Payer: MEDICARE

## 2019-06-04 ENCOUNTER — ANESTHESIA (OUTPATIENT)
Dept: OPERATING ROOM | Age: 84
DRG: 472 | End: 2019-06-04
Payer: MEDICARE

## 2019-06-04 LAB
ANION GAP SERPL CALCULATED.3IONS-SCNC: 11 MEQ/L (ref 8–16)
BLOOD CULTURE, ROUTINE: NORMAL
BLOOD CULTURE, ROUTINE: NORMAL
BUN BLDV-MCNC: 20 MG/DL (ref 7–22)
CALCIUM SERPL-MCNC: 9 MG/DL (ref 8.5–10.5)
CHLORIDE BLD-SCNC: 105 MEQ/L (ref 98–111)
CO2: 24 MEQ/L (ref 23–33)
CREAT SERPL-MCNC: 1.3 MG/DL (ref 0.4–1.2)
GFR SERPL CREATININE-BSD FRML MDRD: 53 ML/MIN/1.73M2
GLUCOSE BLD-MCNC: 104 MG/DL (ref 70–108)
POTASSIUM SERPL-SCNC: 4.6 MEQ/L (ref 3.5–5.2)
SODIUM BLD-SCNC: 140 MEQ/L (ref 135–145)

## 2019-06-04 PROCEDURE — 6370000000 HC RX 637 (ALT 250 FOR IP)

## 2019-06-04 PROCEDURE — 6370000000 HC RX 637 (ALT 250 FOR IP): Performed by: PHYSICIAN ASSISTANT

## 2019-06-04 PROCEDURE — 6370000000 HC RX 637 (ALT 250 FOR IP): Performed by: HOSPITALIST

## 2019-06-04 PROCEDURE — 80048 BASIC METABOLIC PNL TOTAL CA: CPT

## 2019-06-04 PROCEDURE — 99152 MOD SED SAME PHYS/QHP 5/>YRS: CPT | Performed by: INTERNAL MEDICINE

## 2019-06-04 PROCEDURE — 3609017100 HC EGD: Performed by: INTERNAL MEDICINE

## 2019-06-04 PROCEDURE — 36415 COLL VENOUS BLD VENIPUNCTURE: CPT

## 2019-06-04 PROCEDURE — 6370000000 HC RX 637 (ALT 250 FOR IP): Performed by: INTERNAL MEDICINE

## 2019-06-04 PROCEDURE — 1200000000 HC SEMI PRIVATE

## 2019-06-04 PROCEDURE — 2709999900 HC NON-CHARGEABLE SUPPLY

## 2019-06-04 PROCEDURE — 99233 SBSQ HOSP IP/OBS HIGH 50: CPT | Performed by: INTERNAL MEDICINE

## 2019-06-04 PROCEDURE — 2580000003 HC RX 258: Performed by: INTERNAL MEDICINE

## 2019-06-04 PROCEDURE — 6360000002 HC RX W HCPCS: Performed by: INTERNAL MEDICINE

## 2019-06-04 RX ORDER — DIPHENHYDRAMINE HCL 25 MG
25 TABLET ORAL NIGHTLY PRN
Status: DISCONTINUED | OUTPATIENT
Start: 2019-06-04 | End: 2019-06-07 | Stop reason: HOSPADM

## 2019-06-04 RX ORDER — MIDAZOLAM HYDROCHLORIDE 1 MG/ML
INJECTION INTRAMUSCULAR; INTRAVENOUS PRN
Status: DISCONTINUED | OUTPATIENT
Start: 2019-06-04 | End: 2019-06-04 | Stop reason: ALTCHOICE

## 2019-06-04 RX ORDER — FENTANYL CITRATE 50 UG/ML
INJECTION, SOLUTION INTRAMUSCULAR; INTRAVENOUS PRN
Status: DISCONTINUED | OUTPATIENT
Start: 2019-06-04 | End: 2019-06-04 | Stop reason: ALTCHOICE

## 2019-06-04 RX ADMIN — METOPROLOL TARTRATE 25 MG: 25 TABLET ORAL at 20:44

## 2019-06-04 RX ADMIN — ATORVASTATIN CALCIUM 40 MG: 40 TABLET, FILM COATED ORAL at 20:44

## 2019-06-04 RX ADMIN — RANOLAZINE 500 MG: 500 TABLET, FILM COATED, EXTENDED RELEASE ORAL at 20:44

## 2019-06-04 RX ADMIN — LEVOTHYROXINE SODIUM 125 MCG: 125 TABLET ORAL at 09:47

## 2019-06-04 RX ADMIN — DIPHENHYDRAMINE HCL 25 MG: 25 TABLET ORAL at 22:11

## 2019-06-04 RX ADMIN — ACETAMINOPHEN 650 MG: 325 TABLET ORAL at 22:16

## 2019-06-04 RX ADMIN — SODIUM CHLORIDE: 4.5 INJECTION, SOLUTION INTRAVENOUS at 17:49

## 2019-06-04 RX ADMIN — ACETAMINOPHEN 650 MG: 325 TABLET ORAL at 05:47

## 2019-06-04 RX ADMIN — DIPHENHYDRAMINE HCL 25 MG: 25 TABLET ORAL at 00:50

## 2019-06-04 RX ADMIN — PANTOPRAZOLE SODIUM 40 MG: 40 TABLET, DELAYED RELEASE ORAL at 09:47

## 2019-06-04 ASSESSMENT — PAIN SCALES - GENERAL
PAINLEVEL_OUTOF10: 0
PAINLEVEL_OUTOF10: 4
PAINLEVEL_OUTOF10: 2
PAINLEVEL_OUTOF10: 3
PAINLEVEL_OUTOF10: 0
PAINLEVEL_OUTOF10: 6
PAINLEVEL_OUTOF10: 0

## 2019-06-04 ASSESSMENT — PAIN - FUNCTIONAL ASSESSMENT: PAIN_FUNCTIONAL_ASSESSMENT: 0-10

## 2019-06-04 NOTE — PROGRESS NOTES
Drowsy, arouses to verbal stimuli. Dr Merline Speller in to speak with pt and family regarding findings and plan of care.

## 2019-06-04 NOTE — PROGRESS NOTES
Called surgery to see where at on line up. Stated dr. Traci Trujillo was on his way up to talk to family. Pt and family informed.

## 2019-06-04 NOTE — PROGRESS NOTES
Progress note: Infectious diseases    Patient - Paolo Wilson,  Age - 81 y.o.    - 1935      Room Number - 5K-14/014-A   MRN -  [id number]   Acct # - [de-identified]  Date of Admission -  2019 10:53 AM    SUBJECTIVE:   Surgery planned for tomorrow  OBJECTIVE   VITALS    height is 5' 8\" (1.727 m) and weight is 179 lb 2 oz (81.3 kg). His oral temperature is 98.1 °F (36.7 °C). His blood pressure is 101/58 (abnormal) and his pulse is 71. His respiration is 16 and oxygen saturation is 95%. Wt Readings from Last 3 Encounters:   19 179 lb 2 oz (81.3 kg)   11 173 lb (78.5 kg)       I/O (24 Hours)    Intake/Output Summary (Last 24 hours) at 6/3/2019 2009  Last data filed at 6/3/2019 1535  Gross per 24 hour   Intake 940.1 ml   Output 2100 ml   Net -1159.9 ml       General Appearance  Awake, alert, oriented,  not  In acute distress  HEENT - normocephalic, atraumatic, pink conjunctiva,  anicteric sclera  Neck - Supple, no mass  Lungs -  Bilateral good air entry, no rhonchi, no wheeze  Cardiovascular - Heart sounds are normal.  Regular rate and rhythm without murmur, gallop or rub.   Abdomen - soft, not distended, nontender,   Neurologic - oriented  Skin - No bruising or bleeding  Extremities - No edema, no cyanosis, clubbing     MEDICATIONS:      lidocaine  1 patch Transdermal Daily    Or    lidocaine  2 patch Transdermal Daily    Or    lidocaine  3 patch Transdermal Daily    amLODIPine  5 mg Oral BID    [Held by provider] aspirin EC  81 mg Oral Daily    atorvastatin  40 mg Oral Nightly    isosorbide mononitrate  60 mg Oral Daily    levothyroxine  125 mcg Oral Daily    metoprolol tartrate  25 mg Oral BID    pantoprazole  40 mg Oral QAM AC    ranolazine  500 mg Oral BID    trimethoprim  100 mg Oral Daily    vitamin D  1,000 Units Oral Daily    sodium chloride flush  10 mL Intravenous 2 times per day

## 2019-06-05 ENCOUNTER — APPOINTMENT (OUTPATIENT)
Dept: GENERAL RADIOLOGY | Age: 84
DRG: 472 | End: 2019-06-05
Attending: HOSPITALIST
Payer: MEDICARE

## 2019-06-05 VITALS
DIASTOLIC BLOOD PRESSURE: 71 MMHG | SYSTOLIC BLOOD PRESSURE: 115 MMHG | RESPIRATION RATE: 3 BRPM | TEMPERATURE: 97.5 F | OXYGEN SATURATION: 96 %

## 2019-06-05 PROBLEM — R07.9 CHEST PAIN: Status: ACTIVE | Noted: 2017-12-28

## 2019-06-05 PROBLEM — N39.0 RECURRENT UTI: Status: ACTIVE | Noted: 2018-05-01

## 2019-06-05 PROBLEM — I10 ESSENTIAL HYPERTENSION, BENIGN: Status: ACTIVE | Noted: 2019-06-05

## 2019-06-05 PROBLEM — N18.30: Status: ACTIVE | Noted: 2018-01-08

## 2019-06-05 PROBLEM — I20.0 ANGINA PECTORIS, UNSTABLE (HCC): Status: ACTIVE | Noted: 2017-03-14

## 2019-06-05 PROBLEM — N17.9 AKI (ACUTE KIDNEY INJURY) (HCC): Status: RESOLVED | Noted: 2019-06-03 | Resolved: 2019-06-05

## 2019-06-05 PROBLEM — N31.9 NEUROGENIC BLADDER: Status: ACTIVE | Noted: 2019-06-05

## 2019-06-05 PROBLEM — N26.1 ATROPHIC KIDNEY: Status: ACTIVE | Noted: 2019-06-05

## 2019-06-05 PROBLEM — Z22.322 MRSA NASAL COLONIZATION: Status: ACTIVE | Noted: 2019-06-05

## 2019-06-05 PROBLEM — R06.1 STRIDOR: Status: ACTIVE | Noted: 2019-06-05

## 2019-06-05 PROBLEM — J84.10 PULMONARY FIBROSIS (HCC): Status: ACTIVE | Noted: 2019-06-05

## 2019-06-05 LAB
ANION GAP SERPL CALCULATED.3IONS-SCNC: 11 MEQ/L (ref 8–16)
BUN BLDV-MCNC: 17 MG/DL (ref 7–22)
CALCIUM SERPL-MCNC: 8 MG/DL (ref 8.5–10.5)
CHLORIDE BLD-SCNC: 107 MEQ/L (ref 98–111)
CO2: 21 MEQ/L (ref 23–33)
CREAT SERPL-MCNC: 1.2 MG/DL (ref 0.4–1.2)
EKG ATRIAL RATE: 66 BPM
EKG P AXIS: 46 DEGREES
EKG P-R INTERVAL: 204 MS
EKG Q-T INTERVAL: 398 MS
EKG QRS DURATION: 86 MS
EKG QTC CALCULATION (BAZETT): 417 MS
EKG R AXIS: 48 DEGREES
EKG T AXIS: 94 DEGREES
EKG VENTRICULAR RATE: 66 BPM
GFR SERPL CREATININE-BSD FRML MDRD: 58 ML/MIN/1.73M2
GLUCOSE BLD-MCNC: 117 MG/DL (ref 70–108)
MRSA SCREEN RT-PCR: NEGATIVE
POTASSIUM SERPL-SCNC: 4.6 MEQ/L (ref 3.5–5.2)
SODIUM BLD-SCNC: 139 MEQ/L (ref 135–145)
TROPONIN T: < 0.01 NG/ML
VANCOMYCIN RESISTANT ENTEROCOCCUS: NEGATIVE

## 2019-06-05 PROCEDURE — 2709999900 HC NON-CHARGEABLE SUPPLY: Performed by: NEUROLOGICAL SURGERY

## 2019-06-05 PROCEDURE — 93005 ELECTROCARDIOGRAM TRACING: CPT | Performed by: INTERNAL MEDICINE

## 2019-06-05 PROCEDURE — 87500 VANOMYCIN DNA AMP PROBE: CPT

## 2019-06-05 PROCEDURE — 7100000001 HC PACU RECOVERY - ADDTL 15 MIN: Performed by: NEUROLOGICAL SURGERY

## 2019-06-05 PROCEDURE — 80048 BASIC METABOLIC PNL TOTAL CA: CPT

## 2019-06-05 PROCEDURE — 0RB30ZZ EXCISION OF CERVICAL VERTEBRAL DISC, OPEN APPROACH: ICD-10-PCS | Performed by: NEUROLOGICAL SURGERY

## 2019-06-05 PROCEDURE — 6370000000 HC RX 637 (ALT 250 FOR IP): Performed by: PHYSICIAN ASSISTANT

## 2019-06-05 PROCEDURE — 2709999900 HC NON-CHARGEABLE SUPPLY

## 2019-06-05 PROCEDURE — 22853 INSJ BIOMECHANICAL DEVICE: CPT | Performed by: NEUROLOGICAL SURGERY

## 2019-06-05 PROCEDURE — 6360000002 HC RX W HCPCS: Performed by: ANESTHESIOLOGY

## 2019-06-05 PROCEDURE — 72040 X-RAY EXAM NECK SPINE 2-3 VW: CPT

## 2019-06-05 PROCEDURE — 2500000003 HC RX 250 WO HCPCS: Performed by: NURSE ANESTHETIST, CERTIFIED REGISTERED

## 2019-06-05 PROCEDURE — 3209999900 FLUORO FOR SURGICAL PROCEDURES

## 2019-06-05 PROCEDURE — 6360000002 HC RX W HCPCS

## 2019-06-05 PROCEDURE — 87205 SMEAR GRAM STAIN: CPT

## 2019-06-05 PROCEDURE — 88311 DECALCIFY TISSUE: CPT

## 2019-06-05 PROCEDURE — 2580000003 HC RX 258: Performed by: NURSE ANESTHETIST, CERTIFIED REGISTERED

## 2019-06-05 PROCEDURE — 7100000000 HC PACU RECOVERY - FIRST 15 MIN: Performed by: NEUROLOGICAL SURGERY

## 2019-06-05 PROCEDURE — 84484 ASSAY OF TROPONIN QUANT: CPT

## 2019-06-05 PROCEDURE — 6360000002 HC RX W HCPCS: Performed by: NURSE ANESTHETIST, CERTIFIED REGISTERED

## 2019-06-05 PROCEDURE — 87070 CULTURE OTHR SPECIMN AEROBIC: CPT

## 2019-06-05 PROCEDURE — 2500000003 HC RX 250 WO HCPCS: Performed by: NEUROLOGICAL SURGERY

## 2019-06-05 PROCEDURE — 2720000010 HC SURG SUPPLY STERILE: Performed by: NEUROLOGICAL SURGERY

## 2019-06-05 PROCEDURE — 88304 TISSUE EXAM BY PATHOLOGIST: CPT

## 2019-06-05 PROCEDURE — 99024 POSTOP FOLLOW-UP VISIT: CPT | Performed by: PHYSICIAN ASSISTANT

## 2019-06-05 PROCEDURE — 87081 CULTURE SCREEN ONLY: CPT

## 2019-06-05 PROCEDURE — 99221 1ST HOSP IP/OBS SF/LOW 40: CPT | Performed by: NURSE PRACTITIONER

## 2019-06-05 PROCEDURE — 6360000002 HC RX W HCPCS: Performed by: PHYSICIAN ASSISTANT

## 2019-06-05 PROCEDURE — 2780000010 HC IMPLANT OTHER: Performed by: NEUROLOGICAL SURGERY

## 2019-06-05 PROCEDURE — 2580000003 HC RX 258: Performed by: NEUROLOGICAL SURGERY

## 2019-06-05 PROCEDURE — 2580000003 HC RX 258: Performed by: PHYSICIAN ASSISTANT

## 2019-06-05 PROCEDURE — 94640 AIRWAY INHALATION TREATMENT: CPT

## 2019-06-05 PROCEDURE — 0RG10A0 FUSION OF CERVICAL VERTEBRAL JOINT WITH INTERBODY FUSION DEVICE, ANTERIOR APPROACH, ANTERIOR COLUMN, OPEN APPROACH: ICD-10-PCS | Performed by: NEUROLOGICAL SURGERY

## 2019-06-05 PROCEDURE — 3700000001 HC ADD 15 MINUTES (ANESTHESIA): Performed by: NEUROLOGICAL SURGERY

## 2019-06-05 PROCEDURE — 2000000000 HC ICU R&B

## 2019-06-05 PROCEDURE — 22551 ARTHRD ANT NTRBDY CERVICAL: CPT | Performed by: NEUROLOGICAL SURGERY

## 2019-06-05 PROCEDURE — 87075 CULTR BACTERIA EXCEPT BLOOD: CPT

## 2019-06-05 PROCEDURE — 3700000000 HC ANESTHESIA ATTENDED CARE: Performed by: NEUROLOGICAL SURGERY

## 2019-06-05 PROCEDURE — 6370000000 HC RX 637 (ALT 250 FOR IP): Performed by: NEUROLOGICAL SURGERY

## 2019-06-05 PROCEDURE — 87641 MR-STAPH DNA AMP PROBE: CPT

## 2019-06-05 PROCEDURE — C1713 ANCHOR/SCREW BN/BN,TIS/BN: HCPCS | Performed by: NEUROLOGICAL SURGERY

## 2019-06-05 PROCEDURE — 99232 SBSQ HOSP IP/OBS MODERATE 35: CPT | Performed by: INTERNAL MEDICINE

## 2019-06-05 PROCEDURE — 36415 COLL VENOUS BLD VENIPUNCTURE: CPT

## 2019-06-05 PROCEDURE — 93010 ELECTROCARDIOGRAM REPORT: CPT | Performed by: NUCLEAR MEDICINE

## 2019-06-05 PROCEDURE — 3600000015 HC SURGERY LEVEL 5 ADDTL 15MIN: Performed by: NEUROLOGICAL SURGERY

## 2019-06-05 PROCEDURE — 3600000005 HC SURGERY LEVEL 5 BASE: Performed by: NEUROLOGICAL SURGERY

## 2019-06-05 DEVICE — ALLOGRAFT BNE 2.5 CC PK VITOSS BA BIMODAL: Type: IMPLANTABLE DEVICE | Status: FUNCTIONAL

## 2019-06-05 DEVICE — SCREW SPNL L14MM DIA3.5MM 14DEG CONVERGING ANG ANT CERV TI: Type: IMPLANTABLE DEVICE | Site: SPINE CERVICAL | Status: FUNCTIONAL

## 2019-06-05 RX ORDER — MORPHINE SULFATE 2 MG/ML
2 INJECTION, SOLUTION INTRAMUSCULAR; INTRAVENOUS EVERY 5 MIN PRN
Status: DISCONTINUED | OUTPATIENT
Start: 2019-06-05 | End: 2019-06-05 | Stop reason: HOSPADM

## 2019-06-05 RX ORDER — ONDANSETRON 2 MG/ML
4 INJECTION INTRAMUSCULAR; INTRAVENOUS EVERY 6 HOURS PRN
Status: DISCONTINUED | OUTPATIENT
Start: 2019-06-05 | End: 2019-06-07 | Stop reason: HOSPADM

## 2019-06-05 RX ORDER — SODIUM CHLORIDE FOR INHALATION 0.9 %
3 VIAL, NEBULIZER (ML) INHALATION ONCE
Status: COMPLETED | OUTPATIENT
Start: 2019-06-05 | End: 2019-06-05

## 2019-06-05 RX ORDER — GLYCOPYRROLATE 1 MG/5 ML
SYRINGE (ML) INTRAVENOUS PRN
Status: DISCONTINUED | OUTPATIENT
Start: 2019-06-05 | End: 2019-06-05 | Stop reason: SDUPTHER

## 2019-06-05 RX ORDER — DEXAMETHASONE SODIUM PHOSPHATE 4 MG/ML
4 INJECTION, SOLUTION INTRA-ARTICULAR; INTRALESIONAL; INTRAMUSCULAR; INTRAVENOUS; SOFT TISSUE ONCE
Status: COMPLETED | OUTPATIENT
Start: 2019-06-06 | End: 2019-06-06

## 2019-06-05 RX ORDER — SUCCINYLCHOLINE/SOD CL,ISO/PF 200MG/10ML
SYRINGE (ML) INTRAVENOUS PRN
Status: DISCONTINUED | OUTPATIENT
Start: 2019-06-05 | End: 2019-06-05 | Stop reason: SDUPTHER

## 2019-06-05 RX ORDER — MEPERIDINE HYDROCHLORIDE 25 MG/ML
12.5 INJECTION INTRAMUSCULAR; INTRAVENOUS; SUBCUTANEOUS EVERY 5 MIN PRN
Status: DISCONTINUED | OUTPATIENT
Start: 2019-06-05 | End: 2019-06-05 | Stop reason: HOSPADM

## 2019-06-05 RX ORDER — PROPOFOL 10 MG/ML
INJECTION, EMULSION INTRAVENOUS PRN
Status: DISCONTINUED | OUTPATIENT
Start: 2019-06-05 | End: 2019-06-05 | Stop reason: SDUPTHER

## 2019-06-05 RX ORDER — ONDANSETRON 2 MG/ML
4 INJECTION INTRAMUSCULAR; INTRAVENOUS
Status: DISCONTINUED | OUTPATIENT
Start: 2019-06-05 | End: 2019-06-05 | Stop reason: HOSPADM

## 2019-06-05 RX ORDER — SODIUM CHLORIDE 0.9 % (FLUSH) 0.9 %
10 SYRINGE (ML) INJECTION PRN
Status: DISCONTINUED | OUTPATIENT
Start: 2019-06-05 | End: 2019-06-07 | Stop reason: HOSPADM

## 2019-06-05 RX ORDER — SODIUM CHLORIDE 0.9 % (FLUSH) 0.9 %
10 SYRINGE (ML) INJECTION EVERY 12 HOURS SCHEDULED
Status: DISCONTINUED | OUTPATIENT
Start: 2019-06-05 | End: 2019-06-07 | Stop reason: HOSPADM

## 2019-06-05 RX ORDER — FENTANYL CITRATE 50 UG/ML
50 INJECTION, SOLUTION INTRAMUSCULAR; INTRAVENOUS EVERY 5 MIN PRN
Status: DISCONTINUED | OUTPATIENT
Start: 2019-06-05 | End: 2019-06-05 | Stop reason: HOSPADM

## 2019-06-05 RX ORDER — FENTANYL CITRATE 50 UG/ML
INJECTION, SOLUTION INTRAMUSCULAR; INTRAVENOUS PRN
Status: DISCONTINUED | OUTPATIENT
Start: 2019-06-05 | End: 2019-06-05 | Stop reason: SDUPTHER

## 2019-06-05 RX ORDER — LIDOCAINE HYDROCHLORIDE 20 MG/ML
INJECTION, SOLUTION INTRAVENOUS PRN
Status: DISCONTINUED | OUTPATIENT
Start: 2019-06-05 | End: 2019-06-05 | Stop reason: SDUPTHER

## 2019-06-05 RX ORDER — DIPHENHYDRAMINE HYDROCHLORIDE 50 MG/ML
12.5 INJECTION INTRAMUSCULAR; INTRAVENOUS
Status: DISCONTINUED | OUTPATIENT
Start: 2019-06-05 | End: 2019-06-05 | Stop reason: HOSPADM

## 2019-06-05 RX ORDER — SODIUM CHLORIDE 9 MG/ML
INJECTION, SOLUTION INTRAVENOUS CONTINUOUS PRN
Status: DISCONTINUED | OUTPATIENT
Start: 2019-06-05 | End: 2019-06-05 | Stop reason: SDUPTHER

## 2019-06-05 RX ORDER — HYDRALAZINE HYDROCHLORIDE 20 MG/ML
5 INJECTION INTRAMUSCULAR; INTRAVENOUS EVERY 10 MIN PRN
Status: DISCONTINUED | OUTPATIENT
Start: 2019-06-05 | End: 2019-06-05 | Stop reason: HOSPADM

## 2019-06-05 RX ORDER — LABETALOL HYDROCHLORIDE 5 MG/ML
5 INJECTION, SOLUTION INTRAVENOUS EVERY 5 MIN PRN
Status: DISCONTINUED | OUTPATIENT
Start: 2019-06-05 | End: 2019-06-05 | Stop reason: HOSPADM

## 2019-06-05 RX ORDER — DOCUSATE SODIUM 100 MG/1
100 CAPSULE, LIQUID FILLED ORAL 2 TIMES DAILY
Status: DISCONTINUED | OUTPATIENT
Start: 2019-06-05 | End: 2019-06-07 | Stop reason: HOSPADM

## 2019-06-05 RX ORDER — KETOROLAC TROMETHAMINE 30 MG/ML
15 INJECTION, SOLUTION INTRAMUSCULAR; INTRAVENOUS ONCE
Status: COMPLETED | OUTPATIENT
Start: 2019-06-05 | End: 2019-06-05

## 2019-06-05 RX ORDER — KETOROLAC TROMETHAMINE 30 MG/ML
INJECTION, SOLUTION INTRAMUSCULAR; INTRAVENOUS
Status: COMPLETED
Start: 2019-06-05 | End: 2019-06-05

## 2019-06-05 RX ORDER — EPHEDRINE SULFATE/0.9% NACL/PF 50 MG/5 ML
SYRINGE (ML) INTRAVENOUS PRN
Status: DISCONTINUED | OUTPATIENT
Start: 2019-06-05 | End: 2019-06-05 | Stop reason: SDUPTHER

## 2019-06-05 RX ORDER — DEXAMETHASONE SODIUM PHOSPHATE 4 MG/ML
4 INJECTION, SOLUTION INTRA-ARTICULAR; INTRALESIONAL; INTRAMUSCULAR; INTRAVENOUS; SOFT TISSUE EVERY 6 HOURS
Status: DISCONTINUED | OUTPATIENT
Start: 2019-06-05 | End: 2019-06-05

## 2019-06-05 RX ORDER — PROPOFOL 10 MG/ML
INJECTION, EMULSION INTRAVENOUS CONTINUOUS PRN
Status: DISCONTINUED | OUTPATIENT
Start: 2019-06-05 | End: 2019-06-05 | Stop reason: SDUPTHER

## 2019-06-05 RX ORDER — CEFAZOLIN SODIUM 1 G/3ML
INJECTION, POWDER, FOR SOLUTION INTRAMUSCULAR; INTRAVENOUS PRN
Status: DISCONTINUED | OUTPATIENT
Start: 2019-06-05 | End: 2019-06-05 | Stop reason: SDUPTHER

## 2019-06-05 RX ORDER — HYDROCODONE BITARTRATE AND ACETAMINOPHEN 5; 325 MG/1; MG/1
1 TABLET ORAL EVERY 4 HOURS PRN
Status: DISCONTINUED | OUTPATIENT
Start: 2019-06-05 | End: 2019-06-07 | Stop reason: HOSPADM

## 2019-06-05 RX ORDER — PHENYLEPHRINE HYDROCHLORIDE 10 MG/ML
INJECTION INTRAVENOUS PRN
Status: DISCONTINUED | OUTPATIENT
Start: 2019-06-05 | End: 2019-06-05 | Stop reason: SDUPTHER

## 2019-06-05 RX ADMIN — ISOSORBIDE MONONITRATE 60 MG: 60 TABLET ORAL at 15:32

## 2019-06-05 RX ADMIN — KETOROLAC TROMETHAMINE 15 MG: 30 INJECTION, SOLUTION INTRAMUSCULAR at 13:05

## 2019-06-05 RX ADMIN — SODIUM CHLORIDE: 9 INJECTION, SOLUTION INTRAVENOUS at 08:45

## 2019-06-05 RX ADMIN — DEXTROSE MONOHYDRATE 2 G: 50 INJECTION, SOLUTION INTRAVENOUS at 18:18

## 2019-06-05 RX ADMIN — Medication 10 ML: at 22:00

## 2019-06-05 RX ADMIN — LEVOTHYROXINE SODIUM 125 MCG: 125 TABLET ORAL at 15:32

## 2019-06-05 RX ADMIN — ATORVASTATIN CALCIUM 40 MG: 40 TABLET, FILM COATED ORAL at 22:00

## 2019-06-05 RX ADMIN — PHENYLEPHRINE HYDROCHLORIDE 150 MCG: 10 INJECTION INTRAVENOUS at 10:57

## 2019-06-05 RX ADMIN — ISODIUM CHLORIDE 3 ML: 0.03 SOLUTION RESPIRATORY (INHALATION) at 16:55

## 2019-06-05 RX ADMIN — DOCUSATE SODIUM 100 MG: 100 CAPSULE, LIQUID FILLED ORAL at 22:00

## 2019-06-05 RX ADMIN — PHENYLEPHRINE HYDROCHLORIDE 150 MCG: 10 INJECTION INTRAVENOUS at 11:08

## 2019-06-05 RX ADMIN — NITROGLYCERIN 0.4 MG: 0.4 TABLET, ORALLY DISINTEGRATING SUBLINGUAL at 18:28

## 2019-06-05 RX ADMIN — DOCUSATE SODIUM 100 MG: 100 CAPSULE, LIQUID FILLED ORAL at 15:31

## 2019-06-05 RX ADMIN — PHENYLEPHRINE HYDROCHLORIDE 100 MCG: 10 INJECTION INTRAVENOUS at 09:17

## 2019-06-05 RX ADMIN — Medication 10 MG: at 09:36

## 2019-06-05 RX ADMIN — METOPROLOL TARTRATE 25 MG: 25 TABLET ORAL at 15:32

## 2019-06-05 RX ADMIN — MORPHINE SULFATE 2 MG: 2 INJECTION, SOLUTION INTRAMUSCULAR; INTRAVENOUS at 12:55

## 2019-06-05 RX ADMIN — PHENYLEPHRINE HYDROCHLORIDE 150 MCG: 10 INJECTION INTRAVENOUS at 11:37

## 2019-06-05 RX ADMIN — PHENYLEPHRINE HYDROCHLORIDE 100 MCG: 10 INJECTION INTRAVENOUS at 10:26

## 2019-06-05 RX ADMIN — PHENYLEPHRINE HYDROCHLORIDE 150 MCG: 10 INJECTION INTRAVENOUS at 11:32

## 2019-06-05 RX ADMIN — PROPOFOL 50 MG: 10 INJECTION, EMULSION INTRAVENOUS at 09:19

## 2019-06-05 RX ADMIN — RACEPINEPHRINE HYDROCHLORIDE 11.25 MG: 11.25 SOLUTION RESPIRATORY (INHALATION) at 16:55

## 2019-06-05 RX ADMIN — PHENYLEPHRINE HYDROCHLORIDE 150 MCG: 10 INJECTION INTRAVENOUS at 11:27

## 2019-06-05 RX ADMIN — FENTANYL CITRATE 50 MCG: 50 INJECTION INTRAMUSCULAR; INTRAVENOUS at 12:35

## 2019-06-05 RX ADMIN — CEFAZOLIN 2000 MG: 1 INJECTION, POWDER, FOR SOLUTION INTRAMUSCULAR; INTRAVENOUS; PARENTERAL at 09:16

## 2019-06-05 RX ADMIN — TRIMETHOPRIM 100 MG: 100 TABLET ORAL at 15:32

## 2019-06-05 RX ADMIN — LIDOCAINE HYDROCHLORIDE 100 MG: 20 INJECTION, SOLUTION INTRAVENOUS at 08:24

## 2019-06-05 RX ADMIN — PHENYLEPHRINE HYDROCHLORIDE 100 MCG: 10 INJECTION INTRAVENOUS at 10:31

## 2019-06-05 RX ADMIN — DEXAMETHASONE SODIUM PHOSPHATE 4 MG: 4 INJECTION, SOLUTION INTRAMUSCULAR; INTRAVENOUS at 18:19

## 2019-06-05 RX ADMIN — PHENYLEPHRINE HYDROCHLORIDE 100 MCG: 10 INJECTION INTRAVENOUS at 09:48

## 2019-06-05 RX ADMIN — PROPOFOL 130 MG: 10 INJECTION, EMULSION INTRAVENOUS at 08:24

## 2019-06-05 RX ADMIN — PHENYLEPHRINE HYDROCHLORIDE 150 MCG: 10 INJECTION INTRAVENOUS at 11:20

## 2019-06-05 RX ADMIN — Medication 10 MG: at 10:31

## 2019-06-05 RX ADMIN — PHENYLEPHRINE HYDROCHLORIDE 150 MCG: 10 INJECTION INTRAVENOUS at 11:02

## 2019-06-05 RX ADMIN — PHENYLEPHRINE HYDROCHLORIDE 150 MCG: 10 INJECTION INTRAVENOUS at 09:31

## 2019-06-05 RX ADMIN — PANTOPRAZOLE SODIUM 40 MG: 40 TABLET, DELAYED RELEASE ORAL at 15:38

## 2019-06-05 RX ADMIN — Medication 10 MG: at 09:09

## 2019-06-05 RX ADMIN — RANOLAZINE 500 MG: 500 TABLET, FILM COATED, EXTENDED RELEASE ORAL at 15:31

## 2019-06-05 RX ADMIN — RANOLAZINE 500 MG: 500 TABLET, FILM COATED, EXTENDED RELEASE ORAL at 22:00

## 2019-06-05 RX ADMIN — Medication 0.2 MG: at 09:04

## 2019-06-05 RX ADMIN — HYDROCODONE BITARTRATE AND ACETAMINOPHEN 1 TABLET: 5; 325 TABLET ORAL at 15:31

## 2019-06-05 RX ADMIN — SODIUM CHLORIDE: 9 INJECTION, SOLUTION INTRAVENOUS at 08:37

## 2019-06-05 RX ADMIN — PHENYLEPHRINE HYDROCHLORIDE 150 MCG: 10 INJECTION INTRAVENOUS at 10:51

## 2019-06-05 RX ADMIN — PROPOFOL 20 MG: 10 INJECTION, EMULSION INTRAVENOUS at 08:41

## 2019-06-05 RX ADMIN — SODIUM CHLORIDE: 9 INJECTION, SOLUTION INTRAVENOUS at 10:39

## 2019-06-05 RX ADMIN — Medication 20 MG: at 08:50

## 2019-06-05 RX ADMIN — PHENYLEPHRINE HYDROCHLORIDE 150 MCG: 10 INJECTION INTRAVENOUS at 10:04

## 2019-06-05 RX ADMIN — PHENYLEPHRINE HYDROCHLORIDE 150 MCG: 10 INJECTION INTRAVENOUS at 10:43

## 2019-06-05 RX ADMIN — FENTANYL CITRATE 50 MCG: 50 INJECTION INTRAMUSCULAR; INTRAVENOUS at 12:45

## 2019-06-05 RX ADMIN — PHENYLEPHRINE HYDROCHLORIDE 150 MCG: 10 INJECTION INTRAVENOUS at 09:40

## 2019-06-05 RX ADMIN — PROPOFOL 100 MCG/KG/MIN: 10 INJECTION, EMULSION INTRAVENOUS at 08:49

## 2019-06-05 RX ADMIN — Medication 140 MG: at 08:24

## 2019-06-05 RX ADMIN — KETOROLAC TROMETHAMINE 15 MG: 30 INJECTION, SOLUTION INTRAMUSCULAR; INTRAVENOUS at 13:05

## 2019-06-05 RX ADMIN — FENTANYL CITRATE 100 MCG: 50 INJECTION INTRAMUSCULAR; INTRAVENOUS at 12:15

## 2019-06-05 RX ADMIN — VITAMIN D, TAB 1000IU (100/BT) 1000 UNITS: 25 TAB at 15:32

## 2019-06-05 ASSESSMENT — PULMONARY FUNCTION TESTS
PIF_VALUE: 21
PIF_VALUE: 0
PIF_VALUE: 19
PIF_VALUE: 0
PIF_VALUE: 19
PIF_VALUE: 21
PIF_VALUE: 20
PIF_VALUE: 19
PIF_VALUE: 22
PIF_VALUE: 20
PIF_VALUE: 19
PIF_VALUE: 20
PIF_VALUE: 19
PIF_VALUE: 20
PIF_VALUE: 2
PIF_VALUE: 20
PIF_VALUE: 19
PIF_VALUE: 19
PIF_VALUE: 20
PIF_VALUE: 19
PIF_VALUE: 21
PIF_VALUE: 19
PIF_VALUE: 20
PIF_VALUE: 19
PIF_VALUE: 36
PIF_VALUE: 20
PIF_VALUE: 20
PIF_VALUE: 26
PIF_VALUE: 22
PIF_VALUE: 20
PIF_VALUE: 18
PIF_VALUE: 21
PIF_VALUE: 1
PIF_VALUE: 20
PIF_VALUE: 21
PIF_VALUE: 19
PIF_VALUE: 21
PIF_VALUE: 19
PIF_VALUE: 27
PIF_VALUE: 20
PIF_VALUE: 21
PIF_VALUE: 20
PIF_VALUE: 20
PIF_VALUE: 22
PIF_VALUE: 2
PIF_VALUE: 20
PIF_VALUE: 2
PIF_VALUE: 21
PIF_VALUE: 19
PIF_VALUE: 19
PIF_VALUE: 21
PIF_VALUE: 20
PIF_VALUE: 19
PIF_VALUE: 20
PIF_VALUE: 21
PIF_VALUE: 20
PIF_VALUE: 20
PIF_VALUE: 28
PIF_VALUE: 19
PIF_VALUE: 22
PIF_VALUE: 20
PIF_VALUE: 19
PIF_VALUE: 20
PIF_VALUE: 19
PIF_VALUE: 21
PIF_VALUE: 5
PIF_VALUE: 19
PIF_VALUE: 0
PIF_VALUE: 19
PIF_VALUE: 21
PIF_VALUE: 19
PIF_VALUE: 21
PIF_VALUE: 20
PIF_VALUE: 19
PIF_VALUE: 20
PIF_VALUE: 20
PIF_VALUE: 21
PIF_VALUE: 21
PIF_VALUE: 22
PIF_VALUE: 19
PIF_VALUE: 29
PIF_VALUE: 29
PIF_VALUE: 19
PIF_VALUE: 19
PIF_VALUE: 22
PIF_VALUE: 21
PIF_VALUE: 20
PIF_VALUE: 22
PIF_VALUE: 20
PIF_VALUE: 20
PIF_VALUE: 2
PIF_VALUE: 19
PIF_VALUE: 20
PIF_VALUE: 21
PIF_VALUE: 20
PIF_VALUE: 19
PIF_VALUE: 26
PIF_VALUE: 19
PIF_VALUE: 19
PIF_VALUE: 21
PIF_VALUE: 19
PIF_VALUE: 1
PIF_VALUE: 20
PIF_VALUE: 22
PIF_VALUE: 20
PIF_VALUE: 20
PIF_VALUE: 21
PIF_VALUE: 3
PIF_VALUE: 19
PIF_VALUE: 20
PIF_VALUE: 19
PIF_VALUE: 18
PIF_VALUE: 19
PIF_VALUE: 20
PIF_VALUE: 25
PIF_VALUE: 20
PIF_VALUE: 20
PIF_VALUE: 19
PIF_VALUE: 19
PIF_VALUE: 3
PIF_VALUE: 19
PIF_VALUE: 19
PIF_VALUE: 21
PIF_VALUE: 5
PIF_VALUE: 20
PIF_VALUE: 19
PIF_VALUE: 2
PIF_VALUE: 20
PIF_VALUE: 21
PIF_VALUE: 20
PIF_VALUE: 19
PIF_VALUE: 21
PIF_VALUE: 20
PIF_VALUE: 21
PIF_VALUE: 19
PIF_VALUE: 19
PIF_VALUE: 21
PIF_VALUE: 19
PIF_VALUE: 19
PIF_VALUE: 16
PIF_VALUE: 19
PIF_VALUE: 21
PIF_VALUE: 21
PIF_VALUE: 19
PIF_VALUE: 20
PIF_VALUE: 19
PIF_VALUE: 19
PIF_VALUE: 20
PIF_VALUE: 20
PIF_VALUE: 22
PIF_VALUE: 21
PIF_VALUE: 21
PIF_VALUE: 20
PIF_VALUE: 19
PIF_VALUE: 21
PIF_VALUE: 19
PIF_VALUE: 19
PIF_VALUE: 20
PIF_VALUE: 21
PIF_VALUE: 21
PIF_VALUE: 0
PIF_VALUE: 20
PIF_VALUE: 21
PIF_VALUE: 32
PIF_VALUE: 20
PIF_VALUE: 21
PIF_VALUE: 19
PIF_VALUE: 19
PIF_VALUE: 21
PIF_VALUE: 20
PIF_VALUE: 19
PIF_VALUE: 20
PIF_VALUE: 19
PIF_VALUE: 21
PIF_VALUE: 21
PIF_VALUE: 20
PIF_VALUE: 20
PIF_VALUE: 21
PIF_VALUE: 20
PIF_VALUE: 22
PIF_VALUE: 19
PIF_VALUE: 21
PIF_VALUE: 20
PIF_VALUE: 19
PIF_VALUE: 20
PIF_VALUE: 20
PIF_VALUE: 19
PIF_VALUE: 20
PIF_VALUE: 22
PIF_VALUE: 20
PIF_VALUE: 19
PIF_VALUE: 20
PIF_VALUE: 19
PIF_VALUE: 19
PIF_VALUE: 20
PIF_VALUE: 20
PIF_VALUE: 31
PIF_VALUE: 1
PIF_VALUE: 21
PIF_VALUE: 20
PIF_VALUE: 16
PIF_VALUE: 20
PIF_VALUE: 33
PIF_VALUE: 21
PIF_VALUE: 19
PIF_VALUE: 19
PIF_VALUE: 1
PIF_VALUE: 21
PIF_VALUE: 19
PIF_VALUE: 1
PIF_VALUE: 44
PIF_VALUE: 20
PIF_VALUE: 21

## 2019-06-05 ASSESSMENT — PAIN DESCRIPTION - DESCRIPTORS
DESCRIPTORS: ACHING;DULL
DESCRIPTORS: ACHING

## 2019-06-05 ASSESSMENT — PAIN SCALES - GENERAL
PAINLEVEL_OUTOF10: 9
PAINLEVEL_OUTOF10: 6
PAINLEVEL_OUTOF10: 0
PAINLEVEL_OUTOF10: 6
PAINLEVEL_OUTOF10: 0
PAINLEVEL_OUTOF10: 3
PAINLEVEL_OUTOF10: 3
PAINLEVEL_OUTOF10: 8
PAINLEVEL_OUTOF10: 6

## 2019-06-05 ASSESSMENT — PAIN DESCRIPTION - LOCATION
LOCATION: BACK
LOCATION: CHEST

## 2019-06-05 ASSESSMENT — PAIN DESCRIPTION - PAIN TYPE
TYPE: ACUTE PAIN
TYPE: ACUTE PAIN

## 2019-06-05 ASSESSMENT — PAIN DESCRIPTION - PROGRESSION: CLINICAL_PROGRESSION: RESOLVED

## 2019-06-05 ASSESSMENT — PAIN DESCRIPTION - ONSET: ONSET: PROGRESSIVE

## 2019-06-05 ASSESSMENT — PAIN DESCRIPTION - ORIENTATION: ORIENTATION: ANTERIOR

## 2019-06-05 NOTE — PROGRESS NOTES
Nutrition Assessment    Type and Reason for Visit: Initial(Length of  Stay Review)    Nutrition Recommendations:   Resume diet when appropriate, plan wound healing ONS initiation then. Nutrition Assessment:   Pt. nutritionally compromised AEB increased nutrient needs for post-surgical wound healing. At risk for further nutrition compromise r/t intermittent NPO status during admit for testing/procedures, intermittent dysphagia, increased nutrient needs to support post-op healing, and need for nutrition support. Will provide wound healing supplement Clay BID when diet resumed. Encouraged good nutrition when diet resumed with spouse and family, as pt sleeping. Will recommend diet initiation when appropriate. Malnutrition Assessment:  · Malnutrition Status: No malnutrition    Nutrition Risk Level: Moderate    Nutrient Needs:  · Estimated Daily Total Kcal: 8562-1113 kcals (20-25 kcals/kg/day based on 81 kg)  · Estimated Daily Protein (g): 98 grams or more (1.4 grams/kg/day based on 70 kg IBW)    Nutrition Diagnosis:   · Problem: Increased nutrient needs  · Etiology: related to Increased demand for energy/nutrients(post-op wound healing)     Signs and symptoms:  as evidenced by Presence of wounds    Objective Information:  · Nutrition-Focused Physical Findings: S/P OR today, osteomyelitis of cervcial spine/possible abscess, s/p ACDF C6-7, biopsy of disc C6-7. Pt just returned from surgery, sleeping. Wife and family present. Wife reports pt with good appetite prior to admit and now. Occasional intermittent dysphagia, known Zenker's diverticulum s/p repair x 2 at [facility]. Note s/p EGD, no dilation done, Zenker's divertic with no sign of infection or fistula. 1 bowel movement in the last 24 hours.    · Wound Type: Surgical Wound(neck incision, s/p ACDF C6-7 and biopsy of disc C6-7 )  · Current Nutrition Therapies:  · Oral Diet Orders: NPO(Previous General, ROLANDO, no caffeine)   · Oral Diet intake: %  · Oral Nutrition Supplement (ONS) Orders: Wound Healing Oral Supplement(Plan Clay BID)  · ONS intake: Unable to assess  · Anthropometric Measures:  · Ht: 5' 8\" (172.7 cm)   · Current Body Wt: 179 lb 2 oz (81.3 kg)(5/29/19 no edema)  · Admission Body Wt: 179 lb 2 oz (81.3 kg)(5/29/19 no edema)  · Usual Body Wt: (Per wife~ 172#, no previous EMR weights)  · % Weight Change:  ,  No significant weight changes reported  · Ideal Body Wt: 154 lb (69.9 kg),   · BMI Classification: BMI 25.0 - 29.9 Overweight    Nutrition Interventions:   (When diet resumed plan wound healing ONS start.)  Continued Inpatient Monitoring, Education Initiated, Coordination of Care(Discussed the need for good nutrition post-op to support healing.)    Nutrition Evaluation:   · Evaluation: Goals set   · Goals: When diet resumed pt will consume 75% or more of meals during LOS to assist with post-op wound healing.     · Monitoring: Nutrition Progression, Supplement Intake, Diet Tolerance, Wound Healing, Weight, Pertinent Labs, Chewing/Swallowing, Monitor Bowel Function      Electronically signed by Deisi Tenorio RD, LD on 6/5/19 at 2:26 PM    Contact Number: [phone]

## 2019-06-05 NOTE — ANESTHESIA POSTPROCEDURE EVALUATION
Department of Anesthesiology  Postprocedure Note    Patient: Shefali Caballero  MRN: [id number]  YOB: 1935  Date of evaluation: 6/5/2019  Time:  2:18 PM     Procedure Summary     Date:  06/05/19 Room / Location:  [facility] OR [address] [place] [address] OR    Anesthesia Start:  0815 Anesthesia Stop:  1296    Procedure:  ACDF C6-7 AND BIOPSY OF DISC C6-7 (N/A Neck) Diagnosis:  (OSTEOMYELITIS)    Surgeon:  Jessica Abdi MD Responsible Provider:  Sammi Elizabeth MD    Anesthesia Type:  general ASA Status:  3          Anesthesia Type: general    Edward Phase I: Edward Score: 8    Edward Phase II: Edward Score: 9    Last vitals: Reviewed and per EMR flowsheets.        Anesthesia Post Evaluation    Patient location during evaluation: PACU  Patient participation: complete - patient participated  Level of consciousness: awake and alert  Airway patency: patent  Nausea & Vomiting: no nausea  Complications: no  Cardiovascular status: blood pressure returned to baseline and hemodynamically stable  Respiratory status: acceptable and spontaneous ventilation  Hydration status: euvolemic

## 2019-06-05 NOTE — PROGRESS NOTES
relief and a hospitalist who responded to a rapid response called has recommended and arranged for overnight placement in the ICU for continued observation. I inspected the incision site there was no evidence of hematoma, abnormal swelling, or discharge from the incision site. Vitals: BP (!) 126/59   Pulse 65   Temp 97 °F (36.1 °C) (Oral)   Resp 16   Ht 5' 8\" (1.727 m)   Wt 179 lb 2 oz (81.3 kg)   SpO2 100%   BMI 27.24 kg/m²   Physical Exam:  Alert and attentive. Language appropriate, with no aphasia. Pupils equal.  Facial strength symmetric. Physical Exam   virtually unchanged from admission. Thorough physical exam was halted due to acute respiratory distress. ROS:  Review of Systems  comprehensive review of systems is unobtainable due to patient's status. Patient was complaining of prior to the administration of a breathing treatment performed bedside. 24 hour intake/output:    Intake/Output Summary (Last 24 hours) at 6/5/2019 1742  Last data filed at 6/5/2019 1239  Gross per 24 hour   Intake 3599.69 ml   Output 2195 ml   Net 1404.69 ml     Last 3 weights: Wt Readings from Last 3 Encounters:   05/29/19 179 lb 2 oz (81.3 kg)   12/01/11 173 lb (78.5 kg)         CBC: No results for input(s): WBC, HGB, PLT in the last 72 hours. BMP:    Recent Labs     06/04/19  0951      K 4.6      CO2 24   BUN 20   CREATININE 1.3*   GLUCOSE 104     Calcium:  Recent Labs     06/04/19  0951   CALCIUM 9.0     Magnesium:No results for input(s): MG in the last 72 hours. Glucose:No results for input(s): POCGLU in the last 72 hours. HgbA1C: No results for input(s): LABA1C in the last 72 hours. Lipids: No results for input(s): CHOL, TRIG, HDL, LDLCALC in the last 72 hours. Invalid input(s): LDL    Radiology reports as per the Radiologist  Radiology: Xr Chest Standard (2 Vw)    Result Date: 6/3/2019  PROCEDURE: XR CHEST (2 VW) CLINICAL INFORMATION: pre op; hx pulmonary fibrosis.  COMPARISON: November 22, 2011 TECHNIQUE: PA and lateral views the chest. FINDINGS: Postoperative sternotomy changes are visualized. Diffuse coarse interstitial markings are present with basilar volume loss. Chronic interstitial disease cannot be excluded including developing fibrosis at the lung bases. There is no venous congestion or obvious pneumothorax present. The heart remains grossly within normal limits. There is been interval left shoulder arthroplasty. 1. Interval postsurgical changes of the chest and left shoulder. 2. Progressive coarsening of interstitial markings. Correlate with progression of interstitial disease including fibrosis of the posterior lung bases. Superimposed inflammatory bronchitis is not excluded. **This report has been created using voice recognition software. It may contain minor errors which are inherent in voice recognition technology. ** Final report electronically signed by Dr. Ladonna Gomez on 6/3/2019 11:13 PM    Xr Cervical Spine (2-3 Views)    Result Date: 6/5/2019  PROCEDURE: XR CERVICAL SPINE (2-3 VIEWS) CLINICAL INFORMATION: fusion c6-7. COMPARISON: CT 6/01/2019 TECHNIQUE: 49 seconds of fluoroscopy were used. FINDINGS: Image shows a needle from an anterior approach which appears to be pointing towards the 6 level. Fusion is then placed appears to be the C6-C7 level. C7 not well seen. Intraoperative images as above. **This report has been created using voice recognition software. It may contain minor errors which are inherent in voice recognition technology. ** Final report electronically signed by Dr. Waylan Angelucci on 6/5/2019 2:05 PM    Ct Cervical Spine Wo Contrast    Result Date: 6/2/2019  PROCEDURE: CT CERVICAL SPINE WO CONTRAST CLINICAL INFORMATION: To rule out osteo abnormalities. Hypertension. Cervical fusion. COMPARISON: MR cervical spine 5/30/2019. MR cervical spine 5/28/2019.  TECHNIQUE: 3 mm noncontrast axial images were obtained through the cervical spine with sagittal and coronal reconstructions. All CT scans at this facility use dose modulation, iterative reconstruction, and/or weight-based dosing when appropriate to reduce radiation dose to as low as reasonably achievable. FINDINGS: There is lucency in the disc space at the C6-7 level. There is some erosion of the endplates on the right greater than left. This is most noted in the anterior disc space. This corresponds to the area of edema on CT. This can are present osteomyelitis. The cortex of the superior endplate of C7 is indistinct. These findings can represent osteomyelitis. There is a solid fusion across the discs at the C4-C6 levels. There is 4 mm of anterolisthesis of C3 on C4. This is due to facet degenerative changes. The facet joints at this level are fused. There is some mild prevertebral soft tissue swelling anterior to the C6-7 level. There is some gas density seen in the esophagus. In the cervical soft tissues, vascular calcifications are noted. There are calcified pleural plaques bilaterally in the lung apices. There are no suspicious findings in the lung apices. 1. Endplate erosions and mild widening of the anterior disc space at the C6-7 level. This in conjunction with the findings on MRI can represent discitis/osteomyelitis. There is mild thickening of the prevertebral soft tissues at this level. **This report has been created using voice recognition software. It may contain minor errors which are inherent in voice recognition technology. ** Final report electronically signed by Dr. Chad Shepherd on 6/2/2019 8:29 AM    Mri Cervical Spine W Wo Contrast    Result Date: 5/31/2019  PROCEDURE: MRI CERVICAL SPINE W WO CONTRAST CLINICAL INFORMATION: NECK PAIN, CERVICAL SPINE, mri on 5/28 showing possible cervical spine OM/discitis. COMPARISON: None available. Correlation is made to MRI of the cervical spine dated May 28, 2019 and performed at [facility].  TECHNIQUE: Sagittal T1, T2 and STIR sequences were obtained through the cervical spine. Axial fast and echo and gradient echo T2-weighted images were obtained. Postcontrast axial and sagittal T1-weighted images were obtained. Postcontrast images were obtained after administration of 15 mL ProHance intravenous contrast. FINDINGS: The cervical spine is imaged from the craniocervical junction to the T3 level. No suspicious finding is identified at the cervical medullary junction. No abnormal signal or expansion is present within the cervical spinal cord. No abnormal enhancement is seen within the cord parenchyma. There is a subtle, peripherally enhancing collection within the epidural space posterior to the C6-T1 levels. The collection measures approximately 0.3 x 0.7 x 3.5 cm. There is straightening of the expected cervical lordosis. Anterolisthesis is noted of C3 on C4 which measures approximately 0.2 cm. There is also minimal anterolisthesis of C7 on T1. Bony fusion is noted across the C4-5 and C5-6 disc spaces. There is subtle hyperintense T2 signal and enhancement within the C6-7 disc space. Marrow edema and enhancement is also noted within the adjacent C6 and C7 marrow spaces. With regards to the disc spaces, at C2-C3, the spinal canal is patent. Uncovertebral joint spurring and facet arthropathy results in mild neural foraminal narrowing on the left without significant neural foraminal narrowing on the right. At C3-C4, there is uncovering of the disc. The spinal canal is patent. Uncovertebral joint spurring and facet arthropathy causes mild to moderate neural foraminal narrowing bilaterally. At C4-C5, the spinal canal is patent. Uncovertebral joint spurring and facet arthropathy causes mild to moderate neural foraminal narrowing on the right and mild neural foraminal narrowing on the left. At C5-C6, the spinal canal is patent. Uncovertebral joint spurring is present causing mild bilateral neural foraminal narrowing. At C6-C7, there is a disc osteophyte complex. This results in mild spinal canal narrowing. Uncovertebral joint spurring is present causing mild bilateral neural foraminal narrowing. At C7-T1, there is a disc osteophyte complex. The spinal canal is patent. Uncovertebral joint spurring causes mild neural foraminal narrowing bilaterally. Soft tissue swelling and enhancement is noted involving the prevertebral soft tissues along the C6-7 level. No defined fluid collection is identified. Note is also made of signal loss along the right side of the esophagus at the C5-6 level which may correspond to a gas-filled esophageal diverticulum. 1. Subtle hyperintense T2 signal and enhancement is noted within the C6-7 disc space. Marrow edema and enhancement is also noted within the adjacent C6 and C7 marrow spaces. This is suspicious for discitis/osteomyelitis. There is also a small, peripherally enhancing fluid collection extending from C6 to T1 which is suspicious for a small epidural abscess. 2. Soft tissue swelling and enhancement is noted within the prevertebral soft tissues surrounding the C6-7 level which is suspicious for infectious inflammatory changes. No defined fluid collection is identified. Note is also made of an area of signal loss adjacent to the esophagus along the C5-6 level which may correspond to a gas-filled esophageal diverticulum. 3. Multilevel spondylotic changes are present throughout the cervical spine and are further discussed by level in the findings. Findings were conveyed to the patient's nurse, Zia Howard, at [phone] hours on 5/31/2019. **This report has been created using voice recognition software. It may contain minor errors which are inherent in voice recognition technology. ** Final report electronically signed by Dr. Shakira Strong on 5/31/2019 7:58 AM    Mri Comparison Of Outside Films    Result Date: 5/30/2019  Radiology exam is complete. No Radiologist dictation. Please follow up with ordering provider.      Fluoro For Surgical Procedures    Result Date: 6/5/2019  Radiology exam is complete. No Radiologist dictation. Please follow up with ordering provider. A/P: S/P  Patient is status post recovery from a CDF at C6 7 with biopsy performed by Dr. Azalea Quan without complication today. Rapid response notification occurred just prior, and during my visit. Patient was experiencing some difficulty breathing with noted stridor at 1700. Breathing treatment was conducted at bedside providing some relief. We agree with recommendation by the hospitalist for placement in the ICU for observation overnight. Dr. Lencho Goodwin has been notified and has agreed to receive the patient. In order for Decadron 4MG/ Q6 is ordered.   Evidence of swelling at the incision site, no evidence of hematoma on exam.  Neurosurgery to follow    Electronically signed by Nga Curry PA-C on 6/5/2019 at 5:42 PM

## 2019-06-05 NOTE — OP NOTE
[address] [address]  RECORD OF OPERATION    Patient name: Cinthia Pablo  Medical Record Number: [id number]  Account Number: [de-identified]      Date of Procedure: 6/5/2019    Pre-operative Diagnosis:   cervical osteomyelitis at the level of C6-C7      Post-operative Diagnosis:     PROCEDURE:       · Removal of C6-7 disk  · Fusion of C6 to C7  · Placement of intervertebral body [place] stand alone cage ([id number]) at C6-7. ·       Placement of one screw (14 mm) screw at C6. · Placement of 2 screws (12 mm) at C7. · Placement of biologic graft ( Vitoss). · Using of  operating microscope. · Using of interoperative neurophysiology. Anesthesia: General endotracheal    Surgeons/Assistants: Lea Macias MD     Estimated Blood Loss: less than 50 ml    Drains: None    Blood Transfusions: None     Complications: none immediately appreciated    Specimens:  Form C6-C7 disc space    Indications for Procedure:     - This is an 81year old male with a progressive history of neck pain who underwent C-spine MRI that showed findings suggestive of cervical osteomyelitis. -  There was no neurological deficit and all the lab results on the blood cultures were inconclusive. A CT guided biopsy was proposed initially. However, IR team stated that it is difficult to them to perform a CT guided biopsyfor this patient. -  I had long discussion with patient and his family, I stated to them that in the face of uncertainty regarding the pathology process that is going on in patient C-spine, and in the face the technical difficultly of CT-guided biopsy and the negative lab results so far. And given that fact that osteomyelitis of the cervical spine can follow a much more dramatic and rapidly deteriorating process, leading to early neurologic deficit, I recommended for patient at this time a more aggressive approach in the form of surgical intervention (mainly, a biopsy plus ACDF at the level of C6-C7).   I emphasized for the patient that he may also need another surgical intervention from posterior aspect of his cervical spine based on the progression of his symptoms.    -  I explained for patient and his family the recommended surgical intervention and the alternative treatment options in detail ( in front our neurosurgery PA, Cata Yuan),  as well as the associated risks and benefits.  - All question and concerns were addressed and answered. - Patient and his family elected to proceeded with recommended surgical intervention. PROCEDURE:     Patient was brought to OR. Patient was positioned supine on the Operating Table. General anesthesia was inducted lines and Rubio were inserted and maintained. The neurophysiology team connected their electrodes and needles according to their protocol. After the right side of the neck was scrubbed , painted and draped as per routine  The  C6-C7 disk space was localized with fluoroscopy Xray. Then, about  a 3 cm incision placed in in parallel to the anterior border of the SCM. The skin was dissected up and down using scissors and the underlying platysma muscle exposed. The platysma was cut longitudinally and the anterior border of the SCM muscle exposed. There was significant scar tissue in that area but we managed to palpate the carotid artery  and dissection in the space between the carotid laterally and the trachea and esophagus medially an again through significant scar tissues was executed. Next,  Cloward retractors were used . The anterior aspect of the spine was exposed. Xray was taken to confirm localization of C6-7 level. Next the operating microscope was brought in. The longus colli muscles were identified and dissected from the spine. Self retaining retractor were placed and 2 [place] [address] pins 16 mm were inserted inC6 and C7  respectively. A significant anterior osteophyte mainly at inferior aspect of C6 with slight yellowish discoloration in bone was noticed.  We resected that Osteophyte and we thought that may represent abnorma bone and sent it to biopsy. Next,  the disk between C6 and C7 was entered using a 15 bladed knife. Disk was removed ( and sent to biopsy and cultures ) using a combination of curettes, pituitary rongeurs. A high speed drill was used to smooth the anterior aspect of C6 and C7 as well as to remove posterior osteophytes. The posterior longitudinal ligament was then removed and a generous foraminotomy was conducted in both sides. After that we introduced a 7 mm trial cage  in the disk space. It fitted well and hence we proceeded to remove the trial and insert a 7 mm cage ([place]/[employer] stand alone cage ([id number]))  filled with biogenic graft Vitoss. Then we secured the cage withone screw (14 mm) screw at C6 and 2 screws (12 mm) at C7. Afterwards we proceeded to copious irrigation  And hemostasis. Then we close the platysma and the skin as per routine. EMG,SSEP and MEP remained  almost stable throughout. The patient tolerated the surgery very well without intraoperative complications. At the end of there surgery, patient was extubated and transferred  to PACU for further observation and treatment.      EBL:  less then 50 cc     Tameka Johnson MD  Electronically signed by me on 6/5/2019 at 3:38 PM

## 2019-06-05 NOTE — CONSULTS
Assessment and Plan:        1. Acute Dyspnea: concerns of stridor. Patient earlier in the day did undergo general ASA. No surgical hematoma noted. Responsive to Racemic Epinephrine Aerosol treatment. Decadron IVP X2 doses and monitor. 2. Chest Pain, Acute: Left sided, nonradiating. Not associated with dyspnea, nausea/vomiting or diaphoresis. Noted pre-operative workup with ECHO and Stress Test negative. 2-3/10 Patient tells me history of similar complaint in the past. NTG 1 SL complete resolution noted. Troponin negative, monitor. 3. Cervical osteomyelitis at the level of C6-C7: C6-C7 ACDF and Biopsy of Disc C6-7. Following culture results. 4. ASHD s/p CABG: ASA and Plavix restart when OK from surgeon. Continue BB and Statin  5. Hypothyroidism: history, continue Synthroid  6. Zenker's Diverticulum, history: EGD 6/4/2019 no signs of infection or fistula  7. Essential HPTN: history, monitor  8. Anemia, macrocytic: monitor, trend  9. CKD stage 2-3: monitor, history of left atrophy kidney, dose medications to GFR  10. Neurogenic Bladder: history of Recurrent UTI- prophylaxis Trimpex, self catheterization     CC:  Shortness of breath  HPI: Gilford Browner is a 81year old gentlemen admitted 5/29/2019 under the Hospitalist service per PCP with abnormal MRI reported that there is abnormal edema within the paraspinal soft tissue and prevertebral soft tissue, just ventral to the C6-C7 vertebral bodies on the T1 sequence. Question of small amount of epidural phlegmon. Patient has a significant history of ASHD s/p CABG 6V 2011, Essential HPTN, Dyslipidemia, Basal Skin Cell Cancer, GERD, VTE, Hypothyroidism, Recurrent UTI with Urinary Incontinence: pt self cath's and takes Trimpex. Neck Surgery dates back 40 years ago. He had a spinal fusion in 1976 and 1977, and he had lower back surgeries  two in 1995, one in 1996, 1997, and 2010. CRP 0.69/Sed Rate 27. Patient required ASA/Plavix wash out prior to biopsy. Cardiology Dr. Boo Nobles did evaluate felt intermediate risk. 2019 ECHO-LVEF 50-55%, Stress Test-negative for ischemia. Blood Cultures are inconclusive. 2019 under the care of Dr. Jairon Hernandez EGD was completed Zenker's Diverticulum with no signs of infeciton or fistula. 2019 Patient did undergo a ACDF C6-7 and Biopsy of Disc C6-7. Rapid Response was called post operatively with complaints of difficulty breathing and stridor. No surgical site hematoma noted. Patient was responsive to Decadron and was transferred to the ICU.     ROS: GENERAL: No recent change in weight, no fever,chills, night sweats, but mild fatigue. SKN: No lesions or rashes. HEAD: No headaches or recent injury  EYES: No acute changes in vision, no diplopia or blurred vision  EARS: No hearing loss, no tinnitus  NOSE/THROAT: No rhinorrhea or pharyngitis, no nasal drainage  NECK: No lumps or unusual neck stiffness  PULMONARY: No dyspnea, orthopnea or paroxysmal nocturnal dyspnea  CARDIAC: No chest pain, pressure, history of HPTN  GI: No history melena or hematochezia, no diarrhea or constipation  PERIPHERAL VASCULAR: No intermittent claudication or unusual leg cramps  MUSCULOSKELETAL: Occasional arthralgias, myalgias  NEUROLOGICAL: No history of CVA, TIA, Seizures or Syncope  HEMATOLOGIC:  No anemia, unusual bruising or bleeding  No PE, DVT, CANCER  PSYCH: No history of depression or anxiety    PMH:  Per HPI  SHX:   lives with wife, former smoker, Denies any ETOH and substance use.  Retried farmer  FHX: Mother  DMT2, CVA, Father  CAD  Allergies: Lopid, Zocor, Flomax, Levaquin, Amoxicillin  Medications:       sodium chloride flush  10 mL Intravenous 2 times per day    docusate sodium  100 mg Oral BID    ceFAZolin (ANCEF) IVPB  2 g Intravenous Q8H    [START ON 2019] dexamethasone  4 mg Intravenous Once    lidocaine  1 patch Transdermal Daily    Or    lidocaine  2 patch Transdermal Daily    Or    lidocaine  3 patch Transdermal Daily    [Held by provider] amLODIPine  5 mg Oral BID    [Held by provider] aspirin EC  81 mg Oral Daily    atorvastatin  40 mg Oral Nightly    isosorbide mononitrate  60 mg Oral Daily    levothyroxine  125 mcg Oral Daily    metoprolol tartrate  25 mg Oral BID    pantoprazole  40 mg Oral QAM AC    ranolazine  500 mg Oral BID    trimethoprim  100 mg Oral Daily    vitamin D  1,000 Units Oral Daily    [Held by provider] enoxaparin  40 mg Subcutaneous Daily       sodium chloride flush 10 mL PRN   ondansetron 4 mg Q6H PRN   HYDROcodone 5 mg - acetaminophen 1 tablet Q4H PRN   diphenhydrAMINE 25 mg Nightly PRN   docusate sodium 100 mg BID PRN   nitroGLYCERIN 0.4 mg Q5 Min PRN     Vital Signs: T: 97 P: 70 RR: 16 B/P: 118/60: FiO2: 2L/NC: O2 Sat:98%: I/O: pending  CAM-ICU:   Pending   General:  Pale, ill in appearance  HEENT:  normocephalic and atraumatic. No scleral icterus. Right neck dressing dry and intact. Neck: supple. No JVD. No stridor audible  Lungs: clear to auscultation. No retractions  Cardiac: S1S2, Cardiac Telemetry NSR  Abdomen: soft. Nontender, bowel sounds present X4  Extremities:  No clubbing, cyanosis, or edema x 4. Vasculature: capillary refill < 3 seconds. Skin:  warm and dry. Psych:  Alert and oriented x3. Affect appropriate  Lymph:  No supraclavicular adenopathy. Neurologic:  No focal deficit. Data: (All radiographs, tracings, PFTs, and imaging are personally viewed and interpreted unless otherwise noted).  Labs reviewed Sodium 139, Potassium 4.6, Chloride 107, CO2-21, BUN/CREA 17/1.2, Glucose 117 Troponin 0.010   EKG NSR HR 66, IN.20, QT.39   ECHO 6/3/2019 LVEF 50-55%   Stress Test 6/3/2019-negative for ischemia.      Ulises Morillo DNP, APRN-CNP

## 2019-06-05 NOTE — PROGRESS NOTES
 metoprolol tartrate  25 mg Oral BID    pantoprazole  40 mg Oral QAM AC    ranolazine  500 mg Oral BID    trimethoprim  100 mg Oral Daily    vitamin D  1,000 Units Oral Daily    sodium chloride flush  10 mL Intravenous 2 times per day    [Held by provider] enoxaparin  40 mg Subcutaneous Daily     PRN Meds: morphine, fentanNYL, HYDROmorphone, diphenhydrAMINE, ondansetron, labetalol, hydrALAZINE, meperidine, bacitracin irrigation, diphenhydrAMINE, docusate sodium, nitroGLYCERIN, sodium chloride flush, ondansetron, potassium chloride **OR** potassium alternative oral replacement **OR** potassium chloride, magnesium sulfate, senna, acetaminophen      Intake/Output Summary (Last 24 hours) at 6/5/2019 1122  Last data filed at 6/5/2019 1058  Gross per 24 hour   Intake 3330.87 ml   Output 1870 ml   Net 1460.87 ml       Diet:  Diet NPO, After Midnight    Exam:  /71   Pulse 61   Temp 98.2 °F (36.8 °C) (Oral)   Resp 19   Ht 5' 8\" (1.727 m)   Wt 179 lb 2 oz (81.3 kg)   SpO2 95%   BMI 27.24 kg/m²     General appearance: No apparent distress, appears stated age and cooperative. HEENT: Pupils equal, round, and reactive to light. Conjunctivae/corneas clear. Neck: Supple, with full range of motion. No jugular venous distention. Trachea midline. Respiratory:  Normal respiratory effort. Clear to auscultation, bilaterally without Rales/Wheezes/Rhonchi. Cardiovascular: Regular rate and rhythm with normal S1/S2 without murmurs, rubs or gallops. Abdomen: Soft, non-tender, non-distended with normal bowel sounds. Musculoskeletal: passive and active ROM x 4 extremities. Skin: Skin color, texture, turgor normal.  No rashes or lesions. Neurologic:  Neurovascularly intact without any focal sensory/motor deficits.  Cranial nerves: II-XII intact, grossly non-focal.  Psychiatric: Alert and oriented, thought content appropriate, normal insight  Capillary Refill: Brisk,< 3 seconds   Peripheral Pulses: +2 palpable, equal bilaterally       Labs:   Recent Labs     06/02/19  1256   WBC 7.5   HGB 13.1*   HCT 39.2*        Recent Labs     06/02/19  1256 06/04/19  0951    140   K 4.4 4.6    105   CO2 24 24   BUN 18 20   CREATININE 1.1 1.3*   CALCIUM 8.8 9.0     No results for input(s): AST, ALT, BILIDIR, BILITOT, ALKPHOS in the last 72 hours. Recent Labs     06/03/19  1551   INR 0.99     No results for input(s): [place] [employer] in the last 72 hours. Urinalysis:      Lab Results   Component Value Date    NITRU NEGATIVE 11/22/2011    WBCUA 15-25 11/22/2011    BACTERIA NONE 11/22/2011    RBCUA 0-2 11/22/2011    BLOODU NEGATIVE 11/22/2011    GLUCOSEU NEGATIVE 11/22/2011       Radiology:  XR CHEST STANDARD (2 VW)   Final Result   1. Interval postsurgical changes of the chest and left shoulder. 2. Progressive coarsening of interstitial markings. Correlate with progression of interstitial disease including fibrosis of the posterior lung bases. Superimposed inflammatory bronchitis is not excluded. **This report has been created using voice recognition software. It may contain minor errors which are inherent in voice recognition technology. **      Final report electronically signed by Dr. Manju Velarde on 6/3/2019 11:13 PM      CT CERVICAL SPINE WO CONTRAST   Final Result       1. Endplate erosions and mild widening of the anterior disc space at the C6-7 level. This in conjunction with the findings on MRI can represent discitis/osteomyelitis. There is mild thickening of the prevertebral soft tissues at this level. **This report has been created using voice recognition software. It may contain minor errors which are inherent in voice recognition technology. **      Final report electronically signed by Dr. Shyann Arevalo on 6/2/2019 8:29 AM      MRI CERVICAL SPINE W WO CONTRAST   Final Result      1. Subtle hyperintense T2 signal and enhancement is noted within the C6-7 disc space.  Marrow edema and enhancement is also noted within the adjacent C6 and C7 marrow spaces. This is suspicious for discitis/osteomyelitis. There is also a small,    peripherally enhancing fluid collection extending from C6 to T1 which is suspicious for a small epidural abscess. 2. Soft tissue swelling and enhancement is noted within the prevertebral soft tissues surrounding the C6-7 level which is suspicious for infectious inflammatory changes. No defined fluid collection is identified. Note is also made of an area of signal    loss adjacent to the esophagus along the C5-6 level which may correspond to a gas-filled esophageal diverticulum. 3. Multilevel spondylotic changes are present throughout the cervical spine and are further discussed by level in the findings. Findings were conveyed to the patient's nurse, Lj Iqbal, at [phone] hours on 5/31/2019. **This report has been created using voice recognition software. It may contain minor errors which are inherent in voice recognition technology. **      Final report electronically signed by Dr. Lavern Catherine on 5/31/2019 7:58 AM      MRI COMPARISON OF OUTSIDE FILMS   Final Result          Diet: Diet NPO, After Midnight    DVT prophylaxis: [] Lovenox                                 [x] SCDs                                 [] SQ Heparin                                 [] Encourage ambulation           [] Already on Anticoagulation     Disposition:    [] Home       [] TCU       [] Rehab       [] Psych       [] SNF       [] [place]       [] Other-    Code Status: Full Code    PT/OT Eval Status: pending     Assessment/Plan:    Anticipated Discharge in : TBD     Active Hospital Problems    Diagnosis Date Noted    [facility] [N17.9] 06/03/2019    Encounter for examination for normal comparison and control in clinical research program [Z00.6]     Acute osteomyelitis of cervical spine [facility] [M46.22] 05/29/2019   

## 2019-06-05 NOTE — ANESTHESIA PRE PROCEDURE
Department of Anesthesiology  Preprocedure Note       Name:  Paolo Wilson   Age:  81 y.o.  :  1935                                          MRN:  [id number]         Date:  2019      Surgeon: Pete Foster):  MD Elkin Harrell MD    Procedure: ACDF C6-7 AND BIOPSY OF DISC C6-7 (N/A Neck)  EGD ESOPHAGOGASTRODUODENOSCOPY (Left Esophagus)    Medications prior to admission:   Prior to Admission medications    Medication Sig Start Date End Date Taking? Authorizing Provider   levothyroxine (SYNTHROID) 125 MCG tablet Take 125 mcg by mouth Daily 1/2 tablet on empty stomach   Yes Historical Provider, MD   vitamin D (CHOLECALCIFEROL) 1000 UNIT TABS tablet Take 1,000 Units by mouth daily   Yes Historical Provider, MD   isosorbide mononitrate (IMDUR) 60 MG extended release tablet Take 60 mg by mouth daily   Yes Historical Provider, MD   trimethoprim (TRIMPEX) 100 MG tablet Take 100 mg by mouth daily   Yes Historical Provider, MD   atorvastatin (LIPITOR) 40 MG tablet Take 40 mg by mouth nightly   Yes Historical Provider, MD   ranolazine (RANEXA) 500 MG extended release tablet Take 500 mg by mouth 2 times daily   Yes Historical Provider, MD   pantoprazole sodium (PROTONIX) 40 MG PACK packet Take 40 mg by mouth every morning (before breakfast)   Yes Historical Provider, MD   clopidogrel (PLAVIX) 75 MG tablet Take 75 mg by mouth daily. Yes Historical Provider, MD   metoprolol (LOPRESSOR) 50 MG tablet Take 25 mg by mouth 2 times daily. Yes Historical Provider, MD   aspirin EC 81 MG EC tablet Take 81 mg by mouth daily. Yes Historical Provider, MD   amlodipine (NORVASC) 10 MG tablet Take 5 mg by mouth 2 times daily. Yes Historical Provider, MD   acetaminophen (TYLENOL) 500 MG tablet Take 500 mg by mouth every 6 hours as needed.      Yes Historical Provider, MD   docusate sodium (COLACE) 100 MG capsule Take 100 mg by mouth 2 times daily as needed    Yes Historical Provider, MD   nitroGLYCERIN (NITROSTAT) 0.4 MG SL tablet Place 0.4 mg under the tongue every 5 minutes as needed.       Historical Provider, MD       Current medications:    Current Facility-Administered Medications   Medication Dose Route Frequency Provider Last Rate Last Dose    diphenhydrAMINE (BENADRYL) tablet 25 mg  25 mg Oral Nightly PRN [employer], PA-C   25 mg at 06/04/19 2211    lidocaine 4 % external patch 1 patch  1 patch Transdermal Daily Valentino Jesus, MD        Or    lidocaine 4 % external patch 2 patch  2 patch Transdermal Daily Valentino Jesus, MD        Or    lidocaine 4 % external patch 3 patch  3 patch Transdermal Daily Valentino Jesus, MD        0.45 % sodium chloride infusion   Intravenous Continuous Valentino Jesus,  mL/hr at 06/04/19 1749      [Held by provider] amLODIPine (NORVASC) tablet 5 mg  5 mg Oral BID Marigene Mallika, DO   5 mg at 06/03/19 2022    [Held by provider] aspirin EC tablet 81 mg  81 mg Oral Daily Marigene Mallika, DO   Stopped at 05/30/19 0955    atorvastatin (LIPITOR) tablet 40 mg  40 mg Oral Nightly Marigene Mallika, DO   40 mg at 06/04/19 2044    docusate sodium (COLACE) capsule 100 mg  100 mg Oral BID PRN Marigene Mallika, DO   100 mg at 06/02/19 0845    isosorbide mononitrate (IMDUR) extended release tablet 60 mg  60 mg Oral Daily Marigene Mallika, DO   60 mg at 06/03/19 1144    levothyroxine (SYNTHROID) tablet 125 mcg  125 mcg Oral Daily Marigene Mallika, DO   125 mcg at 06/04/19 0947    metoprolol tartrate (LOPRESSOR) tablet 25 mg  25 mg Oral BID Valentino Jesus, MD   25 mg at 06/04/19 2044    nitroGLYCERIN (NITROSTAT) SL tablet 0.4 mg  0.4 mg Sublingual Q5 Min PRN Marigene Mallika, DO        pantoprazole (PROTONIX) tablet 40 mg  40 mg Oral QAM AC Marigene Mallika, DO   40 mg at 06/04/19 0947    ranolazine (RANEXA) extended release tablet 500 mg  500 mg Oral BID Marigene Mallika, DO   500 mg at 06/04/19 2044    trimethoprim (TRIMPEX) tablet 100 mg  100 mg Oral Daily Kenny Tena DO   100 mg at 06/03/19 1145    vitamin D (CHOLECALCIFEROL) tablet 1,000 Units  1,000 Units Oral Daily Narvis Moros, DO   1,000 Units at 06/03/19 1144    sodium chloride flush 0.9 % injection 10 mL  10 mL Intravenous 2 times per day Narvis Moros, DO   10 mL at 06/03/19 1145    sodium chloride flush 0.9 % injection 10 mL  10 mL Intravenous PRN Narvis Moros, DO        ondansetron [facility]) injection 4 mg  4 mg Intravenous Q6H PRN Narvis Moros, DO        potassium chloride (KLOR-CON M) extended release tablet 40 mEq  40 mEq Oral PRN Narvis Moros, DO        Or    potassium bicarb-citric acid (EFFER-K) effervescent tablet 40 mEq  40 mEq Oral PRN Narvis Moros, DO        Or    potassium chloride 10 mEq/100 mL IVPB (Peripheral Line)  10 mEq Intravenous PRN Narvis Moros, DO        magnesium sulfate 1 g in dextrose 5 % 100 mL IVPB  1 g Intravenous PRN Narvis Moros, DO        senna (SENOKOT) tablet 8.6 mg  1 tablet Oral Daily PRN Narvis Moros, DO        [Held by provider] enoxaparin (LOVENOX) injection 40 mg  40 mg Subcutaneous Daily Narvis Moros, DO   40 mg at 05/29/19 2032    acetaminophen (TYLENOL) tablet 650 mg  650 mg Oral Q6H PRN Narvis Moros, DO   650 mg at 06/04/19 2216       Allergies:     Allergies   Allergen Reactions    Lopid [Gemfibrozil] Other (See Comments)     Unable to walk    Zocor [Simvastatin] Other (See Comments)     Unable to walk    Flomax [Tamsulosin Hcl] Other (See Comments)     dizziness    Levaquin [Levofloxacin] Other (See Comments)     Patient cannot remember    Amoxicillin-Pot Clavulanate Rash       Problem List:    Patient Active Problem List   Diagnosis Code    CAD (coronary artery disease) I25.10    Personal history of DVT (deep vein thrombosis) Z86.718    Hyperlipidemia E78.5    Hypertension I10    Stented coronary artery, last 2004 Z95.5    Transverse myelitis, 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    Pulmonary embolism ([address] I26.99    Acute osteomyelitis of cervical spine ([address] M46.22    ABELARDO (acute kidney injury) ([address] N17.9    Encounter for examination for normal comparison and control in clinical research program Z00.6       Past Medical History:        Diagnosis Date    CAD (coronary artery disease)     [facility]     basal cell    Hyperlipidemia     Hypertension     Personal history of DVT (deep vein thrombosis)     Transverse myelitis ([facility] 1997       Past Surgical History:        Procedure Laterality Date    ANGIOPLASTY  1991, 1992, 1993, 1996, 2000, 2004   [facility] x2, 1996, 1997, 2010   [address]  2001, 2006, 2009    CERVICAL FUSION  [id number]    CORONARY ANGIOPLASTY WITH STENT PLACEMENT  2000-2002    x3   2010 [facility]    right hand    SHOULDER SURGERY      TONSILLECTOMY AND ADENOIDECTOMY  1942    UPPER GASTROINTESTINAL ENDOSCOPY Left 6/4/2019    EGD ESOPHAGOGASTRODUODENOSCOPY performed by Sonia Waters MD at [facility]       Social History:    Social History     Tobacco Use    Smoking status: Former Smoker    Smokeless tobacco: Former User     Quit date: 1/1/1967   Substance Use Topics    Alcohol use:  Yes                                Counseling given: Not Answered      Vital Signs (Current):   Vitals:    06/04/19 1327 06/04/19 1414 06/04/19 1931 06/05/19 0300   BP: (!) 149/64 139/68 132/60 137/71   Pulse: 59 63 59 61   Resp: 18 18 18 19   Temp: 97 °F (36.1 °C) 98.5 °F (36.9 °C) 97.6 °F (36.4 °C) 98.2 °F (36.8 °C)   TempSrc: Oral Oral Oral Oral   SpO2: 97% 95%     Weight:       Height:                                                  BP Readings from Last 3 Encounters:   06/05/19 137/71   12/01/11 138/70       NPO Status: Time of last liquid consumption: 0530(sip water with med)                        Time of last solid consumption: 1800                        Date of last liquid consumption: 06/04/19                        Date of last solid food consumption: 06/03/19    BMI:   Wt Readings from Last 3 Encounters:   05/29/19 179 lb 2 oz (81.3 kg)   12/01/11 173 lb (78.5 kg)     Body mass index is 27.24 kg/m². CBC:   Lab Results   Component Value Date    WBC 7.5 06/02/2019    RBC 4.00 06/02/2019    RBC 4.40 11/22/2011    HGB 13.1 06/02/2019    HCT 39.2 06/02/2019    MCV 98.0 06/02/2019    RDW 13.0 11/22/2011     06/02/2019       CMP:   Lab Results   Component Value Date     06/04/2019    K 4.6 06/04/2019    K 5.1 05/30/2019     06/04/2019    CO2 24 06/04/2019    BUN 20 06/04/2019    CREATININE 1.3 06/04/2019    LABGLOM 53 06/04/2019    GLUCOSE 104 06/04/2019    GLUCOSE 106 12/11/2011    PROT 6.5 05/30/2019    CALCIUM 9.0 06/04/2019    BILITOT 0.5 05/30/2019    ALKPHOS 79 05/30/2019    AST 22 05/30/2019    ALT 32 05/30/2019       POC Tests: No results for input(s): POCGLU, POCNA, POCK, POCCL, POCBUN, POCHEMO, POCHCT in the last 72 hours. Coags:   Lab Results   Component Value Date    INR 0.99 06/03/2019    APTT 36.6 06/03/2019       HCG (If Applicable): No results found for: PREGTESTUR, PREGSERUM, HCG, HCGQUANT     ABGs: No results found for: PHART, PO2ART, RFF8LTW, ZQS3LRU, BEART, M7VDLVGG     Type & Screen (If Applicable):  Lab Results   Component Value Date    LABRH POS 06/03/2019       Anesthesia Evaluation    Airway: Mallampati: II     Neck ROM: limited  Mouth opening: > = 3 FB Dental:          Pulmonary:       (-) COPD                           Cardiovascular:    (+) hypertension:, CAD:, CABG/stent: no interval change,       ECG reviewed  Rhythm: regular    Echocardiogram reviewed                  Neuro/Psych:      (-) CVA           GI/Hepatic/Renal:             Endo/Other:                     Abdominal:           Vascular:                                        Anesthesia Plan      general     ASA 3       Induction: intravenous. Anesthetic plan and risks discussed with patient. Plan discussed with CRNA.                   Melinda Haley MD   6/5/2019

## 2019-06-05 NOTE — BRIEF OP NOTE
Brief Postoperative Note  ______________________________________________________________    Patient: Shefali Caballero  YOB: 1935  MRN: [id number]  Date of Procedure: 6/5/2019    Pre-Op Diagnosis: OSTEOMYELITIS    Post-Op Diagnosis: Same       Procedure(s):  ACDF C6-7 AND BIOPSY OF DISC C6-7    Anesthesia: General    Surgeon(s):  Jessica Abdi MD    Assistant: Dylon Mayo PA-C    Estimated Blood Loss (mL): less than 50     Complications: None    Specimens:   ID Type Source Tests Collected by Time Destination   1 : BIOPSY C6-7 A&A Tissue Spine ANAEROBIC AND AEROBIC CULTURE Lea Macias MD 6/5/2019 1023    A : C6-7 PERMANENT Tissue Spine SURGICAL PATHOLOGY Lea Macias MD 6/5/2019 4132    B : BIOPSY TISSUE C6-7 Tissue Spine SURGICAL PATHOLOGY Lea Macias MD 6/5/2019 1102        Implants:  Implant Name Type Inv.  Item Serial No.  Lot No. LRB No. Used   GRAFT VITOSS BIMODAL FOAM PACK 2.5CC Spine GRAFT VITOSS BIMODAL FOAM PACK 2.5CC  [facility] [place] [employer] [id number] N/A 1   SCREW LK SLFTP 3.5X14MM Spine SCREW LK SLFTP 3.5X14MM  [employer]  N/A 1   ZAIDA [id number] [place] [employer], REF # [id number]      N/A 1   ZAIDA 3.5X12 SCREWS REF # [id number]      N/A 2         Drains: None  Urethral Catheter Non-latex 16 fr (Active)       Findings: Osteomyelitis     Dylon Mayo PA-C  Date: 6/5/2019  Time: 12:06 PM

## 2019-06-05 NOTE — PROGRESS NOTES
1340  Patient meets PACU D/C criteria at this time. Patient is awake and alert on 2L NC and VSS. Family updated. Report called to Sharri Lynn.   Patient transported back to [place] [address] room 14 on 2L NC.

## 2019-06-05 NOTE — PROGRESS NOTES
Patient admitted to ICU 13. Reports breathing is easy and unlabored. Voice is slightly raspy. But patient reports he feels much better. Reports cardiac chest pain 3/10. EKG completed, Dr Sandhya Brown and Kayla Leo CNP updated. Family at bedside and updated.

## 2019-06-06 LAB
ANION GAP SERPL CALCULATED.3IONS-SCNC: 14 MEQ/L (ref 8–16)
BASOPHILS # BLD: 0 %
BASOPHILS ABSOLUTE: 0 THOU/MM3 (ref 0–0.1)
BUN BLDV-MCNC: 19 MG/DL (ref 7–22)
CALCIUM SERPL-MCNC: 8.3 MG/DL (ref 8.5–10.5)
CHLORIDE BLD-SCNC: 101 MEQ/L (ref 98–111)
CO2: 19 MEQ/L (ref 23–33)
CREAT SERPL-MCNC: 1.1 MG/DL (ref 0.4–1.2)
EOSINOPHIL # BLD: 0 %
EOSINOPHILS ABSOLUTE: 0 THOU/MM3 (ref 0–0.4)
ERYTHROCYTE [DISTWIDTH] IN BLOOD BY AUTOMATED COUNT: 13 % (ref 11.5–14.5)
ERYTHROCYTE [DISTWIDTH] IN BLOOD BY AUTOMATED COUNT: 46.3 FL (ref 35–45)
GFR SERPL CREATININE-BSD FRML MDRD: 64 ML/MIN/1.73M2
GLUCOSE BLD-MCNC: 204 MG/DL (ref 70–108)
HCT VFR BLD CALC: 35.7 % (ref 42–52)
HEMOGLOBIN: 11.9 GM/DL (ref 14–18)
IMMATURE GRANS (ABS): 0.04 THOU/MM3 (ref 0–0.07)
IMMATURE GRANULOCYTES: 0.6 %
LYMPHOCYTES # BLD: 15.1 %
LYMPHOCYTES ABSOLUTE: 1.1 THOU/MM3 (ref 1–4.8)
MAGNESIUM: 1.8 MG/DL (ref 1.6–2.4)
MCH RBC QN AUTO: 32.6 PG (ref 26–33)
MCHC RBC AUTO-ENTMCNC: 33.3 GM/DL (ref 32.2–35.5)
MCV RBC AUTO: 97.8 FL (ref 80–94)
MONOCYTES # BLD: 3.3 %
MONOCYTES ABSOLUTE: 0.2 THOU/MM3 (ref 0.4–1.3)
NUCLEATED RED BLOOD CELLS: 0 /100 WBC
PLATELET # BLD: 242 THOU/MM3 (ref 130–400)
PMV BLD AUTO: 9.4 FL (ref 9.4–12.4)
POTASSIUM SERPL-SCNC: 4.4 MEQ/L (ref 3.5–5.2)
RBC # BLD: 3.65 MILL/MM3 (ref 4.7–6.1)
SEG NEUTROPHILS: 81 %
SEGMENTED NEUTROPHILS ABSOLUTE COUNT: 5.8 THOU/MM3 (ref 1.8–7.7)
SODIUM BLD-SCNC: 134 MEQ/L (ref 135–145)
TROPONIN T: < 0.01 NG/ML
WBC # BLD: 7.2 THOU/MM3 (ref 4.8–10.8)

## 2019-06-06 PROCEDURE — 6370000000 HC RX 637 (ALT 250 FOR IP): Performed by: PHYSICIAN ASSISTANT

## 2019-06-06 PROCEDURE — 36415 COLL VENOUS BLD VENIPUNCTURE: CPT

## 2019-06-06 PROCEDURE — 1200000000 HC SEMI PRIVATE

## 2019-06-06 PROCEDURE — 99024 POSTOP FOLLOW-UP VISIT: CPT | Performed by: NEUROLOGICAL SURGERY

## 2019-06-06 PROCEDURE — 83735 ASSAY OF MAGNESIUM: CPT

## 2019-06-06 PROCEDURE — 80048 BASIC METABOLIC PNL TOTAL CA: CPT

## 2019-06-06 PROCEDURE — 99233 SBSQ HOSP IP/OBS HIGH 50: CPT | Performed by: INTERNAL MEDICINE

## 2019-06-06 PROCEDURE — 2580000003 HC RX 258: Performed by: PHYSICIAN ASSISTANT

## 2019-06-06 PROCEDURE — 84484 ASSAY OF TROPONIN QUANT: CPT

## 2019-06-06 PROCEDURE — 6360000002 HC RX W HCPCS: Performed by: PHYSICIAN ASSISTANT

## 2019-06-06 PROCEDURE — 6360000002 HC RX W HCPCS: Performed by: NURSE PRACTITIONER

## 2019-06-06 PROCEDURE — 85025 COMPLETE CBC W/AUTO DIFF WBC: CPT

## 2019-06-06 RX ADMIN — TRIMETHOPRIM 100 MG: 100 TABLET ORAL at 08:55

## 2019-06-06 RX ADMIN — Medication 10 ML: at 20:31

## 2019-06-06 RX ADMIN — METOPROLOL TARTRATE 25 MG: 25 TABLET ORAL at 08:55

## 2019-06-06 RX ADMIN — DOCUSATE SODIUM 100 MG: 100 CAPSULE, LIQUID FILLED ORAL at 08:52

## 2019-06-06 RX ADMIN — LEVOTHYROXINE SODIUM 125 MCG: 125 TABLET ORAL at 08:55

## 2019-06-06 RX ADMIN — ATORVASTATIN CALCIUM 40 MG: 40 TABLET, FILM COATED ORAL at 20:31

## 2019-06-06 RX ADMIN — RANOLAZINE 500 MG: 500 TABLET, FILM COATED, EXTENDED RELEASE ORAL at 20:31

## 2019-06-06 RX ADMIN — DOCUSATE SODIUM 100 MG: 100 CAPSULE, LIQUID FILLED ORAL at 20:31

## 2019-06-06 RX ADMIN — DEXTROSE MONOHYDRATE 2 G: 50 INJECTION, SOLUTION INTRAVENOUS at 01:54

## 2019-06-06 RX ADMIN — RANOLAZINE 500 MG: 500 TABLET, FILM COATED, EXTENDED RELEASE ORAL at 08:55

## 2019-06-06 RX ADMIN — Medication 10 ML: at 08:53

## 2019-06-06 RX ADMIN — METOPROLOL TARTRATE 25 MG: 25 TABLET ORAL at 20:31

## 2019-06-06 RX ADMIN — PANTOPRAZOLE SODIUM 40 MG: 40 TABLET, DELAYED RELEASE ORAL at 08:53

## 2019-06-06 RX ADMIN — Medication 10 ML: at 01:54

## 2019-06-06 RX ADMIN — VITAMIN D, TAB 1000IU (100/BT) 1000 UNITS: 25 TAB at 08:55

## 2019-06-06 RX ADMIN — ISOSORBIDE MONONITRATE 60 MG: 60 TABLET ORAL at 08:55

## 2019-06-06 RX ADMIN — DEXAMETHASONE SODIUM PHOSPHATE 4 MG: 4 INJECTION, SOLUTION INTRA-ARTICULAR; INTRALESIONAL; INTRAMUSCULAR; INTRAVENOUS; SOFT TISSUE at 01:53

## 2019-06-06 ASSESSMENT — ENCOUNTER SYMPTOMS
WHEEZING: 0
VOMITING: 0
COUGH: 0
PHOTOPHOBIA: 0
COLOR CHANGE: 0
NAUSEA: 0
SHORTNESS OF BREATH: 0
VOICE CHANGE: 0
CHOKING: 0
SORE THROAT: 0
ABDOMINAL PAIN: 0
TROUBLE SWALLOWING: 0
STRIDOR: 0

## 2019-06-06 ASSESSMENT — PAIN SCALES - GENERAL
PAINLEVEL_OUTOF10: 0
PAINLEVEL_OUTOF10: 1

## 2019-06-06 ASSESSMENT — PAIN DESCRIPTION - ORIENTATION: ORIENTATION: RIGHT;LEFT

## 2019-06-06 ASSESSMENT — PAIN DESCRIPTION - LOCATION: LOCATION: SHOULDER

## 2019-06-06 ASSESSMENT — PAIN DESCRIPTION - DESCRIPTORS: DESCRIPTORS: ACHING

## 2019-06-06 ASSESSMENT — PAIN DESCRIPTION - PAIN TYPE: TYPE: ACUTE PAIN

## 2019-06-06 NOTE — PROGRESS NOTES
0800  Patient resting calmly in bed. Denies pain. Swallows water orally without difficulty. Respirations easy, unlabored. 21   Patient up to chair with standby assist.  Gait steady, weak. 1055  Patient report phoned to [phone]. [phone]  Patient transferred to [phone] in wheelchair per transport staff. Wife notified.

## 2019-06-06 NOTE — CARE COORDINATION
Patient transferred to ICU. He returned to 5K post-op ( C6-C7 ACDF and Biopsy of Disc C6-7 by Dr. Dianne Martinez) and developed stridor, therefore he was transferred to ICU per Dr. Livan Santana. Hand-off report called to ICU CM. Phone message left.  Electronically signed by Danae Guerrero RN on 6/6/19 at 9:32 AM

## 2019-06-06 NOTE — PROGRESS NOTES
there is abnormal edema within the paraspinal soft tissue and prevertebral soft tissue, just ventral to the C6-C7 vertebral bodies on the T1 sequence. Question of small amount of epidural phlegmon.     Patient has a significant history of ASHD s/p CABG 6V 2011, Essential HPTN, Dyslipidemia, Basal Skin Cell Cancer, GERD, VTE, Hypothyroidism, Recurrent UTI with Urinary Incontinence: pt self cath's and takes Trimpex. Neck Surgery dates back 40 years ago. He had a spinal fusion in 1976 and 1977, and he had lower back surgeries  two in [facility], one in [address], and 2010.      CRP 0.69/Sed Rate 27. Patient required ASA/Plavix wash out prior to biopsy. Cardiology Dr. Sebastien Coronel did evaluate felt intermediate risk. 6/2/2019 ECHO-LVEF 50-55%, Stress Test-negative for ischemia. Blood Cultures are inconclusive. 6/4/2019 under the care of Dr. Merline Speller EGD was completed Zenker's Diverticulum with no signs of infeciton or fistula. 6/5/2019 Patient did undergo a ACDF C6-7 and Biopsy of Disc C6-7. Rapid Response was called post operatively with complaints of difficulty breathing and stridor. No surgical site hematoma noted. Patient was responsive to Decadron and was transferred to the ICU.  6/6 patient states he feels well, chest pain and SOB resolved. No drooling or trouble swallowing. Review of Systems   Constitutional: Negative for diaphoresis, fatigue and fever. HENT: Negative for drooling, sore throat, trouble swallowing and voice change. Eyes: Negative for photophobia and visual disturbance. Respiratory: Negative for cough, choking, shortness of breath, wheezing and stridor. Cardiovascular: Negative for palpitations and leg swelling. Gastrointestinal: Negative for abdominal pain, nausea and vomiting. Endocrine: Negative for polydipsia and polyphagia. Genitourinary: Negative for decreased urine volume and flank pain. Musculoskeletal: Negative for joint swelling and neck pain.    Skin: Positive for wound (neck). Negative for color change and rash. Allergic/Immunologic: Negative for food allergies and immunocompromised state. Neurological: Negative for tremors, facial asymmetry, speech difficulty, weakness and headaches. Hematological: Negative for adenopathy. Psychiatric/Behavioral: Negative for agitation and confusion. The patient is not nervous/anxious. PMH:  Per HPI  SHX:   lives with wife, former smoker, Denies any ETOH and substance use. Retried farmer  FHX: Mother:  DMT2, CVA, Father:  CAD  Allergies: Lopid, Zocor, Flomax, Levaquin, Amoxicillin   Medications:        sodium chloride flush  10 mL Intravenous 2 times per day    docusate sodium  100 mg Oral BID    lidocaine  1 patch Transdermal Daily    Or    lidocaine  2 patch Transdermal Daily    Or    lidocaine  3 patch Transdermal Daily    [Held by provider] amLODIPine  5 mg Oral BID    [Held by provider] aspirin EC  81 mg Oral Daily    atorvastatin  40 mg Oral Nightly    isosorbide mononitrate  60 mg Oral Daily    levothyroxine  125 mcg Oral Daily    metoprolol tartrate  25 mg Oral BID    pantoprazole  40 mg Oral QAM AC    ranolazine  500 mg Oral BID    trimethoprim  100 mg Oral Daily    vitamin D  1,000 Units Oral Daily    [Held by provider] enoxaparin  40 mg Subcutaneous Daily       sodium chloride flush 10 mL PRN   ondansetron 4 mg Q6H PRN   HYDROcodone 5 mg - acetaminophen 1 tablet Q4H PRN   diphenhydrAMINE 25 mg Nightly PRN   docusate sodium 100 mg BID PRN   nitroGLYCERIN 0.4 mg Q5 Min PRN     Vital Signs: T: 98.3 P: 81 RR: 18 B/P: 125/68: O2 Sat:95: I/O: 5101/9553  CAM-ICU:   Negative   General:   Acutely ill appearing white male   HEENT:  normocephalic and atraumatic. No scleral icterus. Neck: supple. No JVD. Lungs: clear to auscultation. No retractions  Cardiac: RRR  Abdomen: soft. Nontender. Extremities:  No clubbing, cyanosis, or edema x 4.     Vasculature: capillary refill < 3 seconds. Skin:  warm and dry. Psych:  Alert and oriented x3. Affect appropriate  Lymph:  No supraclavicular adenopathy. Neurologic:  No focal deficit. Data: (All radiographs, tracings, PFTs, and imaging are personally viewed and interpreted unless otherwise noted).   Sodium 134, Potassium 4.4, chloride 101, C02 19, BUN 19, creatinine 1.1, anion 14, mag 1.8, glucose 204   WBC 7.2, H/H 11.9, 35.7         Cultures negative        CT cervical spine, endplate erosions and mild widening of the anterior disc space at the C6-7 level. This in conjunction with the findings on MRI can represent discitis/osteomyelitis. There is mild thickening of the prevertebral soft tissues at this level                       ECHO 6/3, EF 50-55%    Case and plan discussed with Dr. Marian Tomas, plans for transfer to medical/surgical unit   patient seen by me. Agree with plan. Electronically signed by Raúl Tomas MD.

## 2019-06-06 NOTE — PROGRESS NOTES
Progress note: Infectious diseases    Patient - Jorgito Pena,  Age - 81 y.o.    - 1935      Room Number - 4D-13/013-A   MRN -  [id number]   Acct # - [de-identified]  Date of Admission -  2019 10:53 AM    SUBJECTIVE:   He had surgery today. Discussed with dr Jake Christopher. Didn't see prulent material.the bone was very brittle  Culture taken, so far negative  He is getting perioperative antibiotic  OBJECTIVE   VITALS    height is 5' 8\" (1.727 m) and weight is 182 lb 15.7 oz (83 kg). His oral temperature is 97 °F (36.1 °C). His blood pressure is 114/58 (abnormal) and his pulse is 64. His respiration is 16 and oxygen saturation is 98%.        Wt Readings from Last 3 Encounters:   19 182 lb 15.7 oz (83 kg)   11 173 lb (78.5 kg)       I/O (24 Hours)    Intake/Output Summary (Last 24 hours) at 2019 2111  Last data filed at 2019 1239  Gross per 24 hour   Intake 3359.69 ml   Output 2195 ml   Net 1164.69 ml       General Appearance  Awake, alert, oriented,  not  In acute distress  HEENT - normocephalic, atraumatic, slighlty pale conjunctiva,  anicteric sclera  Neck - dressed wound on neck  Lungs -  Bilateral good air entry, no rhonchi, no wheeze  Cardiovascular - Heart sounds are normal.     Abdomen - soft, not distended, nontender,   Neurologic - oriented  Skin - No bruising or bleeding  Extremities - No edema, no cyanosis, clubbing     MEDICATIONS:      sodium chloride flush  10 mL Intravenous 2 times per day    docusate sodium  100 mg Oral BID    ceFAZolin (ANCEF) IVPB  2 g Intravenous Q8H    [START ON 2019] dexamethasone  4 mg Intravenous Once    lidocaine  1 patch Transdermal Daily    Or    lidocaine  2 patch Transdermal Daily    Or    lidocaine  3 patch Transdermal Daily    [Held by provider] amLODIPine  5 mg Oral BID    [Held by provider] aspirin EC  81 mg Oral Daily    atorvastatin  40 mg Oral Nightly    isosorbide mononitrate  60 mg Oral Daily    levothyroxine  125 mcg Oral Daily    metoprolol tartrate  25 mg Oral BID    pantoprazole  40 mg Oral QAM AC    ranolazine  500 mg Oral BID    trimethoprim  100 mg Oral Daily    vitamin D  1,000 Units Oral Daily    [Held by provider] enoxaparin  40 mg Subcutaneous Daily       sodium chloride flush, ondansetron, HYDROcodone 5 mg - acetaminophen, diphenhydrAMINE, docusate sodium, nitroGLYCERIN      LABS:     CBC:   No results for input(s): WBC, HGB, PLT in the last 72 hours. BMP:    Recent Labs     06/04/19  0951 06/05/19 1954    139   K 4.6 4.6    107   CO2 24 21*   BUN 20 17   CREATININE 1.3* 1.2   GLUCOSE 104 117*     Calcium:  Recent Labs     06/05/19 1954   CALCIUM 8.0*     No results for input(s): ALKPHOS, ALT, AST, PROT, BILITOT, BILIDIR, LABALBU in the last 72 hours. 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 culture so far negative  Will follow final cx report. discussed with neurosurgeon.   BRANDON lAaniz MD, FACP 6/5/2019 9:11 PM

## 2019-06-06 NOTE — PROGRESS NOTES
Progress note: Infectious diseases    Patient - Aaron Proctor,  Age - 81 y.o.    - 1935      Room Number - 5K-01/001-A   MRN -  [id number]   Acct # - [de-identified]  Date of Admission -  2019 10:53 AM    SUBJECTIVE:   He has no new complaints  Wound healing well  Culture so far negative: no wbc  OBJECTIVE   VITALS    height is 5' 8\" (1.727 m) and weight is 182 lb 15.7 oz (83 kg). His oral temperature is 98.2 °F (36.8 °C). His blood pressure is 120/65 and his pulse is 74. His respiration is 18 and oxygen saturation is 97%.        Wt Readings from Last 3 Encounters:   19 182 lb 15.7 oz (83 kg)   11 173 lb (78.5 kg)       I/O (24 Hours)    Intake/Output Summary (Last 24 hours) at 2019 1503  Last data filed at 2019 1036  Gross per 24 hour   Intake 654 ml   Output 2750 ml   Net -2096 ml       General Appearance  Awake, alert, oriented,  not  In acute distress  HEENT - normocephalic, atraumatic, slighlty pale conjunctiva,  anicteric sclera  Neck - dressed wound on neck   Neurologic - oriented  Skin - No bruising or bleeding  Extremities - No edema, no cyanosis, clubbing     MEDICATIONS:      sodium chloride flush  10 mL Intravenous 2 times per day    docusate sodium  100 mg Oral BID    lidocaine  1 patch Transdermal Daily    Or    lidocaine  2 patch Transdermal Daily    Or    lidocaine  3 patch Transdermal Daily    [Held by provider] amLODIPine  5 mg Oral BID    [Held by provider] aspirin EC  81 mg Oral Daily    atorvastatin  40 mg Oral Nightly    isosorbide mononitrate  60 mg Oral Daily    levothyroxine  125 mcg Oral Daily    metoprolol tartrate  25 mg Oral BID    pantoprazole  40 mg Oral QAM AC    ranolazine  500 mg Oral BID    trimethoprim  100 mg Oral Daily    vitamin D  1,000 Units Oral Daily    [Held by provider] enoxaparin  40 mg Subcutaneous Daily       sodium chloride flush, ondansetron, HYDROcodone 5 mg - acetaminophen, diphenhydrAMINE, docusate sodium, nitroGLYCERIN      LABS:     CBC:   Recent Labs     06/06/19  0324   WBC 7.2   HGB 11.9*        BMP:    Recent Labs     06/04/19  0951 06/05/19  1954 06/06/19  0324    139 134*   K 4.6 4.6 4.4    107 101   CO2 24 21* 19*   BUN 20 17 19   CREATININE 1.3* 1.2 1.1   GLUCOSE 104 117* 204*     Calcium:  Recent Labs     06/06/19  0324   CALCIUM 8.3*     No results for input(s): ALKPHOS, ALT, AST, PROT, BILITOT, BILIDIR, LABALBU in the last 72 hours. 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    Pulmonary embolism (HCC) I26.99    Acute osteomyelitis of cervical spine (HCC) M46.22    Angina pectoris, unstable ([address] I20.0    Atrophic kidney N26.1    Chest pain R07.9    Chronic kidney insufficiency, stage 3 (moderate) (HCC) N18.3    Essential hypertension, benign I10    History of pulmonary embolism Z80.36    Hypothyroidism E03.9    IFG (impaired fasting glucose) R73.01    MRSA nasal colonization Z22.322    Neurogenic bladder N31.9    Pulmonary fibrosis (HCC) J84.10    Recurrent UTI N39.0    Zenker's diverticulum K22.5    ASHD (arteriosclerotic heart disease) I25.10    Stridor R06.1         ASSESSMENT/PLAN   Degenerative back disease: he had surgery today. culture so far negative  Ok with discharge plan   Follow up in one wk at the ID clinic       Chente Wayne MD, [place] [facility] 6/6/2019 3:03 PM

## 2019-06-06 NOTE — PROGRESS NOTES
Post op day 1    S/P:  C6-C7    Breathing issue overnight ( respond well for breathing treatment but pt was transferred to the ICU for further observation and treatment). Doing well today   He breaths well now. Reported improvement in neck pain. A/A/Ox3  FCx4 with good strength through out   Sensory grossly intact. A/P:    Post op day 1    S/P:  C6-C7    - Doing well  - pain control  - Encourage activity  - PT and OT  - Can be transferred to floor  - Flow up the results of  Biopsy and cultures. - Medical management per his primary.

## 2019-06-06 NOTE — PLAN OF CARE
Care plan reviewed with patient and patient. Patient and patient verbalize understanding of the plan of care and contribute to goal setting.
Care plan reviewed with patient. Patient verbalize understanding of the plan of care and contribute to goal setting.
Problem: Falls - Risk of:  Goal: Will remain free from falls  Description  Will remain free from falls  6/1/2019 1403 by Johnathan Bourgeois RN  Outcome: Met This Shift  Fall assessment completed. Patient using call light appropriately to call for assistance with ambulation to bathroom. Personal items within reach. Patient is also compliant with use of non-skid slippers. Problem:   Goal: No urinary complication  9/4/9911 [id number] by Jayro Sheriff RN  Outcome: Met This Shift   Patient self cath. Problem: Pain:  Goal: Pain level will decrease  Description  Pain level will decrease  6/1/2019 1403 by Johnathan Bourgeois RN  Outcome: Ongoing  Patient states pain relief from PRN pain medications. Pain reassessed one hour post PRN pain medication given. Patient rates pain 0 on SETH 0-10 scale. 6/1/2019 0337 by Jayro Sheriff RN  Outcome: Ongoing  Note:   Pain Assessment: 0-10  Pain Level: 3   Pain goal:  3  Is pain goal met at this time? Yes     Additional interventions to be implemented: medications  , position change and rest       Problem: Pain:  Goal: Control of acute pain  Description  Control of acute pain  6/1/2019 0337 by Jayro Sheriff RN  Outcome: Ongoing     Problem: Neurological  Goal: Maximum potential motor/sensory/cognitive function  Outcome: Ongoing  Patient alert & oriented x 4. Patient able to follow commands. Hand grasps equal bilateral.      Problem: Discharge Planning:  Goal: Patients continuum of care needs are met  Description  Patients continuum of care needs are met  6/1/2019 1403 by Johnathan Bourgeois RN  Outcome: Ongoing   Discharge plan is in process. Plan discharge home with family. Care plan reviewed with patient and family. Patient and family verbalize understanding of the plan of care and contribute to goal setting.
Problem: Falls - Risk of:  Goal: Will remain free from falls  Description  Will remain free from falls  6/2/2019 2221 by Yadira Larsen RN  Outcome: Ongoing  Note:   Patient remained free of falls this shift. Fall precautions in place. Items within reach, call light within reach and side rails up x2. Bed alarm is on. Hourly rounding in place. Patient verbalizes understanding of using call light to ask for assistance as needed. Patient is up with standby assist when ambulating. Will monitor. Problem: Pain:  Goal: Pain level will decrease  Description  Pain level will decrease  6/2/2019 2221 by Yadira Larsen RN  Outcome: Ongoing  Note:   Patient rated pain a 8/10 during last assessment. Patient states it comes and goes in intensity. Tylenol given. Rest and repositioning in effect. Patient is now resting. Will monitor. Problem: Neurological  Goal: Maximum potential motor/sensory/cognitive function  6/2/2019 2221 by Yadira Larsen RN  Outcome: Ongoing  Note:   Patient is alert and oriented x4. Denies any numbness or tingling. Problem:   Goal: Adequate urinary output  6/2/2019 2221 by Yadira Larsen RN  Outcome: Ongoing  Note:   Patient self straight caths. Patient is having adequate urine output. Problem: Discharge Planning:  Goal: Patients continuum of care needs are met  Description  Patients continuum of care needs are met  6/2/2019 2221 by Yadira Larsen RN  Outcome: Ongoing  Note:   Patient is planning home with wife at discharge. Waiting stress test in AM and then biopsy on Tuesday is cleared by cardiology. Care plan reviewed with patient. Patient  verbalize understanding of the plan of care and contribute to goal setting.
Problem: Falls - Risk of:  Goal: Will remain free from falls  Description  Will remain free from falls  Outcome: Met This Shift  Fall assessment completed. Patient using call light appropriately to call for assistance with ambulation to bathroom. Personal items within reach. Patient is also compliant with use of non-skid slippers. Problem:   Goal: Adequate urinary output  Outcome: Met This Shift  Patient has ellis cat, post op. Problem: Pain:  Goal: Pain level will decrease  Description  Pain level will decrease  Outcome: Ongoing  Patient states pain relief from PRN pain medications. Pain reassessed one hour post PRN pain medication given. Patient rates pain 0 on SETH 0-10 scale. Problem: Neurological  Goal: Maximum potential motor/sensory/cognitive function  Outcome: Ongoing  Patient alert & oriented x 4. Patient able to follow commands. Hand grasps equal bilaterally. Problem: Discharge Planning:  Goal: Patients continuum of care needs are met  Description  Patients continuum of care needs are met  Outcome: Ongoing  Discharge plan is in process. Plan discharge home with family. Problem: Nutrition  Goal: Optimal nutrition therapy  6/5/2019 1449 by Salas Charles RN  Outcome: Ongoing  Patients appetite good. Continuing to encourage fluids. Care plan reviewed with patient and family. Patient and family verbalize understanding of the plan of care and contribute to goal setting.
Problem: Falls - Risk of:  Goal: Will remain free from falls  Description  Will remain free from falls  Outcome: Met This Shift  Note:   Call light, overbed table, and belongings within reach. Bed/ chair alarm activated. Up with 1 assist. Patient included in hourly rounds. Problem: Neurological  Goal: Maximum potential motor/sensory/cognitive function  Outcome: Met This Shift  Note:   No changes noted in neuro assessment     Problem:   Goal: Adequate urinary output  Outcome: Met This Shift  Note:   Straight cath as per home. Doing self cath as at home     Problem:   Goal: No urinary complication  Outcome: Met This Shift     Problem: Discharge Planning:  Goal: Patients continuum of care needs are met  Description  Patients continuum of care needs are met  Outcome: Met This Shift  Note:   Patient speaking openly about discharge plan. Patient up in room as tolerated, ambulating and tolerating well, preforming ADLs. Pt tolerating oral medications. Nutritional status and needs discussed, tolerating PO well. Patient participating in plan of care, discussing options, needs and concerns. Pt will be discharged home with family support. Problem: Pain:  Goal: Pain level will decrease  Description  Pain level will decrease  Note:   Chronic pain. Awaiting surgery 6/5   Pt aware of plan of care, continuing to update and work with patient to address needs and expectations.
Problem: Falls - Risk of:  Goal: Will remain free from falls  Description  Will remain free from falls  Outcome: Met This Shift  Note:   Fall assessment complete. Bed alarm on, bed in low position, call light and personal items within reach. Nonskid socks on. No falls noted this shift. Will continue to monitor. Problem: Falls - Risk of:  Goal: Absence of physical injury  Description  Absence of physical injury  Outcome: Met This Shift  Note:   Patient free from any physical injury and uses call light appropriately. Problem:   Goal: Adequate urinary output  Outcome: Met This Shift  Note:   Patient self caths himself every 6 hours or sooner if needed with no difficulities. Problem: Pain:  Goal: Pain level will decrease  Description  Pain level will decrease  Outcome: Ongoing  Note:   Pain Assessment: 0-10  Pain Level: 7   Pain goal:  2  Is pain goal met at this time? Yes     Additional interventions to be implemented: medications  , position change and rest  PRN tylenol given       Problem: Discharge Planning:  Goal: Patients continuum of care needs are met  Description  Patients continuum of care needs are met  Outcome: Ongoing  Note:   Discharge plan to home. Care plan reviewed with patient. Patient verbalized understanding of plan of care and contributed to goal setting.
Problem: Falls - Risk of:  Goal: Will remain free from falls  Description  Will remain free from falls  Outcome: Met This Shift  Note:   Fall assessment complete. Bed alarm on, bed in low position, call light and personal items within reach. Nonskid socks on. No falls noted this shift. Will continue to monitor. Problem: Falls - Risk of:  Goal: Absence of physical injury  Description  Absence of physical injury  Outcome: Met This Shift  Note:   Patient is free from any physical injury or harm and is using call light effectively. Problem:   Goal: Adequate urinary output  Outcome: Met This Shift  Note:   Voiding adequate amounts without difficulty. Problem:   Goal: No urinary complication  Outcome: Met This Shift     Problem: Pain:  Goal: Pain level will decrease  Description  Pain level will decrease  Outcome: Ongoing  Note:   Pain Assessment: 0-10  Pain Level: 3   Pain goal:  3  Is pain goal met at this time? Yes     Additional interventions to be implemented: medications  , position change and rest         Problem: Pain:  Goal: Control of acute pain  Description  Control of acute pain  Outcome: Ongoing     Problem: Pain:  Goal: Control of chronic pain  Description  Control of chronic pain  Outcome: Ongoing     Problem: Discharge Planning:  Goal: Patients continuum of care needs are met  Description  Patients continuum of care needs are met  Outcome: Ongoing  Note:   Discharge plan to home. Care plan reviewed with patient. Patient verbalized understanding of plan of care and contributed to goal setting.
Problem: Neurological  Goal: Maximum potential motor/sensory/cognitive function  6/6/2019 1728 by Blanca Lopez RN  Outcome: Met This Shift     Problem: Falls - Risk of:  Goal: Will remain free from falls  Description  Will remain free from falls  6/6/2019 1728 by Blanca Lopez RN  Outcome: Ongoing  Note:   Patient is free from falls. Call light within reach and patient uses appropriately. Side rails up x2. Hourly rounding. Non-skid socks worn with ambulation. Up with standby assist.     Problem: Falls - Risk of:  Goal: Absence of physical injury  Description  Absence of physical injury  6/6/2019 1728 by Blanca Lopez RN  Outcome: Ongoing     Problem: Pain:  Goal: Pain level will decrease  Description  Pain level will decrease  6/6/2019 1728 by Blanca Lopez RN  Outcome: Ongoing  Note:   Patient denies pain needs at this time. PRN norco ordered. Patient encouraged to rest and reposition. Problem:   Goal: Adequate urinary output  6/6/2019 1728 by Blanca Lopez RN  Outcome: Ongoing  Note:   Rubio catheter removed. Patient self caths himself and is also incontinent. Problem:   Goal: No urinary complication  2/1/1595 [id number] by Blanca Lopez RN  Outcome: Ongoing     Problem: Discharge Planning:  Goal: Patients continuum of care needs are met  Description  Patients continuum of care needs are met  6/6/2019 1728 by Blanca Lopez RN  Outcome: Ongoing  Note:   Home with wife at discharge. Unknown discharge date at this time. Problem: Nutrition  Goal: Optimal nutrition therapy  6/6/2019 1728 by Blanca Lopez RN  Outcome: Ongoing  Note:   Cardiac diet. Patient has trouble swallowing bread at times, states will order softer foods and liquid. Care plan reviewed with patient. Patient verbalizes understanding of the plan of care and contribute to goal setting.
Problem: Nutrition  Goal: Optimal nutrition therapy  Outcome: Ongoing   Nutrition Problem: Increased nutrient needs  Intervention: Food and/or Nutrient Delivery: (When diet resumed plan wound healing ONS start.)  Nutritional Goals: When diet resumed pt will consume 75% or more of meals during LOS to assist with post-op wound healing.
Rapid response called as patient reporting to have difficulty breathing and feeling as \"my airway is closing\". Audible stridor can be heard. Maintained appropriate vitals throughout episode. Pt is s/p ACEDF with biopsy by Dr. Nicole Walters on 6/5 for possible cervical OM with discitis. Improved with breathing treatment. No evidence of surgical site hematoma. Spoke with Dr. Nicole Walters who recommends observing patient in ICU overnight. Discussed case with Dr. Elise Yeh who graciously agreed to accept patient.      Luciano Escobedo
planning continues. Problem: Nutrition  Goal: Optimal nutrition therapy  6/6/2019 0354 by Isabell Wray RN  Outcome: Ongoing  Note:   Tolerating prescribed diet without difficulty. Will continue to monitor. Care plan reviewed with patient. Patient verbalizes understanding of the plan of care and contributes to goal setting.

## 2019-06-07 VITALS
WEIGHT: 182.98 LBS | SYSTOLIC BLOOD PRESSURE: 145 MMHG | RESPIRATION RATE: 16 BRPM | HEIGHT: 68 IN | DIASTOLIC BLOOD PRESSURE: 69 MMHG | BODY MASS INDEX: 27.73 KG/M2 | TEMPERATURE: 98.1 F | HEART RATE: 69 BPM | OXYGEN SATURATION: 97 %

## 2019-06-07 PROBLEM — M46.22 ACUTE OSTEOMYELITIS OF CERVICAL SPINE (HCC): Status: RESOLVED | Noted: 2019-05-29 | Resolved: 2019-06-07

## 2019-06-07 PROBLEM — M50.30 DEGENERATIVE DISC DISEASE, CERVICAL: Status: ACTIVE | Noted: 2019-06-07

## 2019-06-07 LAB
ANION GAP SERPL CALCULATED.3IONS-SCNC: 12 MEQ/L (ref 8–16)
BASOPHILS # BLD: 0.2 %
BASOPHILS ABSOLUTE: 0 THOU/MM3 (ref 0–0.1)
BUN BLDV-MCNC: 21 MG/DL (ref 7–22)
CALCIUM SERPL-MCNC: 8.8 MG/DL (ref 8.5–10.5)
CHLORIDE BLD-SCNC: 106 MEQ/L (ref 98–111)
CO2: 21 MEQ/L (ref 23–33)
CREAT SERPL-MCNC: 1 MG/DL (ref 0.4–1.2)
EOSINOPHIL # BLD: 0.3 %
EOSINOPHILS ABSOLUTE: 0 THOU/MM3 (ref 0–0.4)
ERYTHROCYTE [DISTWIDTH] IN BLOOD BY AUTOMATED COUNT: 13.2 % (ref 11.5–14.5)
ERYTHROCYTE [DISTWIDTH] IN BLOOD BY AUTOMATED COUNT: 46.8 FL (ref 35–45)
GFR SERPL CREATININE-BSD FRML MDRD: 71 ML/MIN/1.73M2
GLUCOSE BLD-MCNC: 138 MG/DL (ref 70–108)
HCT VFR BLD CALC: 35.5 % (ref 42–52)
HEMOGLOBIN: 11.9 GM/DL (ref 14–18)
IMMATURE GRANS (ABS): 0.07 THOU/MM3 (ref 0–0.07)
IMMATURE GRANULOCYTES: 0.6 %
LYMPHOCYTES # BLD: 18.4 %
LYMPHOCYTES ABSOLUTE: 2.2 THOU/MM3 (ref 1–4.8)
MAGNESIUM: 2 MG/DL (ref 1.6–2.4)
MCH RBC QN AUTO: 32.4 PG (ref 26–33)
MCHC RBC AUTO-ENTMCNC: 33.5 GM/DL (ref 32.2–35.5)
MCV RBC AUTO: 96.7 FL (ref 80–94)
MONOCYTES # BLD: 10.6 %
MONOCYTES ABSOLUTE: 1.3 THOU/MM3 (ref 0.4–1.3)
MRSA SCREEN: NORMAL
NUCLEATED RED BLOOD CELLS: 0 /100 WBC
PLATELET # BLD: 269 THOU/MM3 (ref 130–400)
PMV BLD AUTO: 9.6 FL (ref 9.4–12.4)
POTASSIUM SERPL-SCNC: 4.3 MEQ/L (ref 3.5–5.2)
RBC # BLD: 3.67 MILL/MM3 (ref 4.7–6.1)
SEG NEUTROPHILS: 69.9 %
SEGMENTED NEUTROPHILS ABSOLUTE COUNT: 8.5 THOU/MM3 (ref 1.8–7.7)
SODIUM BLD-SCNC: 139 MEQ/L (ref 135–145)
WBC # BLD: 12.1 THOU/MM3 (ref 4.8–10.8)

## 2019-06-07 PROCEDURE — 97110 THERAPEUTIC EXERCISES: CPT

## 2019-06-07 PROCEDURE — 6370000000 HC RX 637 (ALT 250 FOR IP): Performed by: PHYSICIAN ASSISTANT

## 2019-06-07 PROCEDURE — 2709999900 HC NON-CHARGEABLE SUPPLY

## 2019-06-07 PROCEDURE — 97530 THERAPEUTIC ACTIVITIES: CPT

## 2019-06-07 PROCEDURE — 83735 ASSAY OF MAGNESIUM: CPT

## 2019-06-07 PROCEDURE — 97161 PT EVAL LOW COMPLEX 20 MIN: CPT

## 2019-06-07 PROCEDURE — 2580000003 HC RX 258: Performed by: PHYSICIAN ASSISTANT

## 2019-06-07 PROCEDURE — 80048 BASIC METABOLIC PNL TOTAL CA: CPT

## 2019-06-07 PROCEDURE — 99239 HOSP IP/OBS DSCHRG MGMT >30: CPT | Performed by: INTERNAL MEDICINE

## 2019-06-07 PROCEDURE — 99024 POSTOP FOLLOW-UP VISIT: CPT | Performed by: PHYSICIAN ASSISTANT

## 2019-06-07 PROCEDURE — 36415 COLL VENOUS BLD VENIPUNCTURE: CPT

## 2019-06-07 PROCEDURE — 97165 OT EVAL LOW COMPLEX 30 MIN: CPT

## 2019-06-07 PROCEDURE — 85025 COMPLETE CBC W/AUTO DIFF WBC: CPT

## 2019-06-07 RX ORDER — DOCUSATE SODIUM 100 MG/1
100 CAPSULE, LIQUID FILLED ORAL 2 TIMES DAILY PRN
Qty: 30 CAPSULE | Refills: 0 | Status: SHIPPED | OUTPATIENT
Start: 2019-06-07

## 2019-06-07 RX ORDER — LIDOCAINE 4 G/G
1 PATCH TOPICAL 2 TIMES DAILY PRN
Qty: 30 PATCH | Refills: 0 | Status: SHIPPED | OUTPATIENT
Start: 2019-06-07 | End: 2019-07-07

## 2019-06-07 RX ADMIN — DOCUSATE SODIUM 100 MG: 100 CAPSULE, LIQUID FILLED ORAL at 08:49

## 2019-06-07 RX ADMIN — PANTOPRAZOLE SODIUM 40 MG: 40 TABLET, DELAYED RELEASE ORAL at 08:49

## 2019-06-07 RX ADMIN — RANOLAZINE 500 MG: 500 TABLET, FILM COATED, EXTENDED RELEASE ORAL at 08:49

## 2019-06-07 RX ADMIN — VITAMIN D, TAB 1000IU (100/BT) 1000 UNITS: 25 TAB at 08:49

## 2019-06-07 RX ADMIN — TRIMETHOPRIM 100 MG: 100 TABLET ORAL at 08:49

## 2019-06-07 RX ADMIN — Medication 10 ML: at 08:57

## 2019-06-07 RX ADMIN — LEVOTHYROXINE SODIUM 125 MCG: 125 TABLET ORAL at 08:49

## 2019-06-07 RX ADMIN — ISOSORBIDE MONONITRATE 60 MG: 60 TABLET ORAL at 08:49

## 2019-06-07 RX ADMIN — METOPROLOL TARTRATE 25 MG: 25 TABLET ORAL at 08:49

## 2019-06-07 ASSESSMENT — PAIN DESCRIPTION - ORIENTATION: ORIENTATION: UPPER

## 2019-06-07 ASSESSMENT — PAIN DESCRIPTION - PROGRESSION: CLINICAL_PROGRESSION: GRADUALLY IMPROVING

## 2019-06-07 ASSESSMENT — PAIN DESCRIPTION - LOCATION: LOCATION: BACK

## 2019-06-07 ASSESSMENT — PAIN SCALES - GENERAL
PAINLEVEL_OUTOF10: 0
PAINLEVEL_OUTOF10: 2
PAINLEVEL_OUTOF10: 3

## 2019-06-07 ASSESSMENT — PAIN DESCRIPTION - PAIN TYPE: TYPE: ACUTE PAIN

## 2019-06-07 ASSESSMENT — PAIN - FUNCTIONAL ASSESSMENT: PAIN_FUNCTIONAL_ASSESSMENT: ACTIVITIES ARE NOT PREVENTED

## 2019-06-07 ASSESSMENT — PAIN DESCRIPTION - FREQUENCY: FREQUENCY: CONTINUOUS

## 2019-06-07 ASSESSMENT — PAIN DESCRIPTION - DIRECTION: RADIATING_TOWARDS: NO

## 2019-06-07 ASSESSMENT — PAIN DESCRIPTION - DESCRIPTORS: DESCRIPTORS: ACHING

## 2019-06-07 ASSESSMENT — PAIN DESCRIPTION - ONSET: ONSET: ON-GOING

## 2019-06-07 NOTE — CARE COORDINATION
6/7/19, 9:53 AM      [address] [facility]. day: 9  Location: [facility]01/001-A Reason for admit: Acute osteomyelitis of cervical spine [facility]) [M46.22]   Procedure: 6/05/19 ACDF C6-7 AND BIOPSY OF DISC C6-7  Treatment Plan of Care: Dr. Augusta Cardona following for Neurosurgery, wound care, SCD's, PT/OT to evaluate and treat. PCP: Sandhya Rodriguez  Readmission Risk Score: 13%  Discharge Plan: Joya Kwon is from home with his wife. Therapy ordered to evaluate patient. He is ambulating without difficulty and is hopeful for discharge to home today.

## 2019-06-07 NOTE — DISCHARGE INSTR - DIET

## 2019-06-07 NOTE — PROGRESS NOTES
[address] [address]  INPATIENT PHYSICAL THERAPY  EVALUATION  [facility] ONC MED 5K - 5K-01/001-A    Time In: 3290  Time Out: 1138  Timed Code Treatment Minutes: 10 Minutes  Minutes: 23          Date: 2019  Patient Name: Fer Varner,  Gender:  male        MRN: [id number]  : 1935  (81 y.o.)      Referring Practitioner: Dr. Selena Velasquez  Diagnosis: acute ostiomelitis of cevical spine  Additional Pertinent Hx: Additional Pertinent Hx: 81 y.o. male was sent to [address] [place] [address] by his PCP with MRI result of cervical osteomyelitis. Patient was having neck pain and arm pain for past 3 weeks without fever or chills. s/p ACDF C6-7 AND BIOPSY OF DISC C6-7 on 19     Past Medical History:   Diagnosis Date    CAD (coronary artery disease)     Cancer ([facility] [address]     basal cell    Hyperlipidemia     Hypertension     Personal history of DVT (deep vein thrombosis)     Transverse myelitis ([facility] [address]      Past Surgical History:   Procedure Laterality Date    ANGIOPLASTY  , , , , ,    [facility] x2, 1996, ,    Ilia Ours  , ,     CERVICAL FUSION  [id number]    CORONARY ANGIOPLASTY WITH STENT PLACEMENT  -2002    x3   [address]    right hand    SHOULDER SURGERY      TONSILLECTOMY AND ADENOIDECTOMY  1942    UPPER GASTROINTESTINAL ENDOSCOPY Left 2019    EGD ESOPHAGOGASTRODUODENOSCOPY performed by Cheri Reno MD at [facility]       Restrictions/Precautions:  Surgical Protocols, General Precautions                    Other position/activity restrictions: cervical precautions       Subjective:  Chart Reviewed: Yes  Patient assessed for rehabilitation services?: Yes  Subjective: 1st attempt, pt in restroom completing bath. 2nd attempt, pt in recliner. Pleasant and cooperative.      General:  Overall Orientation Status: Within Normal Limits  Follows Commands: Within Functional Limits    Vision: Within Functional Limits    Hearing: Within functional limits         Pain:   .  Pain Assessment  Pain Level: 0(with pain patches on neck)       Social/Functional History:    Type of Home: House  Home Layout: One level, Work area in basement  Home Access: (2 through garage with HR, 1 step in front)  Home Equipment: (None used PTA)      ADL Assistance: Independent  Ambulation Assistance: Independent  Transfer Assistance: Independent     Objective:       ROM RLE: WFL  ROM LLE: WFL    Strength RLE: WFL    Strength LLE: WFL       RLE Tone: Normotonic  LLE Tone: Normotonic       Rolling to Left: Modified independent  Rolling to Right: Modified independent  Supine to Sit: Modified independent  Sit to Supine: Modified independent    Transfers  Sit to Stand: Modified independent(trial from recliner, bed and armless chair)  Stand to sit: Modified independent       Ambulation 1  Surface: level tile  Device: No Device  Assistance: Modified Independent  Quality of Gait: slower pace. steady. Managed turns without difficulty  Distance: 10 ft, 20 ft, 190 ft       TINETTI BALANCE TEST    BALANCE Patient is seated in a hard, armless chair   1 SITTING BALANCE 1 - Steady, safe   2. RISES FROM CHAIR 2 - Able without use of arms   3. ATTEMPTS TO RISE 2 - Able to rise, 1 attempt   4. IMMEDIATE STANDING BALANCE (FIRST 5 SECONDS) 2 - Steady without walker or other support   5. STANDING BALANCE 2 - Narrow stance without support   6. NUDGED 2 - Steady   7. EYES CLOSED 0 - Unsteady   8. TURNING 360 DEGREES 1 - Continuous and 1 - Steady   9. SITTING DOWN 2 - Safe,smooth motion   BALANCE SCORE 15/16       GAIT SECTION Patient stands with therapist, walks across room (+/- aids), fist at usual pace, then at rapid pace. 1.  INDICATION OF GAIT (Immediately after told to \"go\" 1 - No hesitancy   2. STEP LENGTH AND HEIGHT 1 - Step through Right and 1 - Step through Left   3.   FOOT CLEARANCE 1 - Left foot clears floor and 1 - Right foot clears floor   4. STEP SYMMETRY 1 - Right and left step length appear equal   5. STEP CONTINUITY 1 - Steps appear continuous   6. PATH 2 - Straight without walking aid   7. TRUNK 2 - No sway, flexion, use of arms or walking aid   8. WALKING TIME 1 - Heels almost touching while walking   GAIT SCORE 12/12   TOTAL SCORE 27/28   Risk Indicators:  Less than/equal to 18 = high risk  19-23 Moderate risk  Greater than/equal to 24 = low risk              Activity Tolerance:  Activity Tolerance: Patient Tolerated treatment well    Treatment Initiated: 2nd gait trial noted above. Education on bed mobility and safety with lights after dark    Assessment: Body structures, Functions, Activity limitations: Decreased balance  Assessment: Pt showing near baseline function with bed mobility, transfers and gait. No formal PT indicated at this time as pt is safe with home mobility. Minimal trunk sway noted with eyes closed. Pt educated on use of lights after dark at all times. Clinical Presentation: Low - Stable and Uncomplicated: based on diagnosis, history and level of assist with evaluation    Decision Making:  Moderate Complexitybased on patient assessment and decision making process of determining plan of care and establishing reasonable expectations for measurable functional outcomes    REQUIRES PT FOLLOW UP: No  No Skilled PT: Safe to return home    Discharge Recommendations:  Discharge Recommendations: Home independently    Patient Education:  Patient Education: use of night lights, log roll for bed mobility to protect neck  Barriers to Learning: None    Equipment Recommendations:  Equipment Needed: No    Safety:  Type of devices: Gait belt, Call light within reach, Left in chair, Chair alarm in place, Nurse notified    Plan:       Goals:  Patient goals : Get home   Short term goals  Time Frame for Short term goals: NA  Long term goals  Time Frame for Long term goals : NA    Evaluation Complexity:

## 2019-06-07 NOTE — PROGRESS NOTES
Discharge instructions given to patient and family, all questions answered. Discharged with all belongings.

## 2019-06-07 NOTE — DISCHARGE SUMMARY
discitis/phlegmon. ESR and CRP normal. Blood Cultures NGTD. Pt underwent ACDF with biopsy by NSx on 6/5. Biopsy having no growth. Pt doing extremely well. Reports improvement in pain. PT/OT recommending home with assist. Pt will be discharged with PCP, ID, and NSx follow-up. Exam:     Vitals:  Vitals:    06/06/19 1633 06/06/19 2011 06/07/19 0414 06/07/19 0846   BP: (!) 125/59 (!) 112/55 128/60 (!) 145/69   Pulse: 70 75 67 69   Resp: 16 16 16 16   Temp: 97.9 °F (36.6 °C) 98.3 °F (36.8 °C) 98 °F (36.7 °C) 98.1 °F (36.7 °C)   TempSrc: Oral Oral Oral Oral   SpO2: 95% 96% 97% 97%   Weight:       Height:         Weight: Weight: 182 lb 15.7 oz (83 kg)     24 hour intake/output:    Intake/Output Summary (Last 24 hours) at 6/7/2019 1217  Last data filed at 6/7/2019 1043  Gross per 24 hour   Intake 850 ml   Output 2100 ml   Net -1250 ml         General appearance:  No apparent distress, appears stated age and cooperative. HEENT:  Normal cephalic, atraumatic without obvious deformity. Pupils equal, round, and reactive to light. Extra ocular muscles intact. Conjunctivae/corneas clear. Neck: Supple, with full range of motion. No jugular venous distention. Trachea midline. Respiratory:  Normal respiratory effort. Clear to auscultation, bilaterally without Rales/Wheezes/Rhonchi. Cardiovascular:  Regular rate and rhythm with normal S1/S2 without murmurs, rubs or gallops. Abdomen: Soft, non-tender, non-distended with normal bowel sounds. Musculoskeletal:  No clubbing, cyanosis or edema bilaterally. Full range of motion without deformity. Skin: Skin color, texture, turgor normal.  No rashes or lesions. Neurologic:  Neurovascularly intact without any focal sensory/motor deficits. Cranial nerves: II-XII intact, grossly non-focal.  Psychiatric:  Alert and oriented, thought content appropriate, normal insight  Capillary Refill: Brisk,< 3 seconds   Peripheral Pulses: +2 palpable, equal bilaterally       Labs:  For convenience and continuity at follow-up the following most recent labs are provided:      CBC:    Lab Results   Component Value Date    WBC 12.1 06/07/2019    HGB 11.9 06/07/2019    HCT 35.5 06/07/2019     06/07/2019       Renal:    Lab Results   Component Value Date     06/07/2019    K 4.3 06/07/2019    K 5.1 05/30/2019     06/07/2019    CO2 21 06/07/2019    BUN 21 06/07/2019    CREATININE 1.0 06/07/2019    CALCIUM 8.8 06/07/2019         Significant Diagnostic Studies    Radiology:   [facility] FOR SURGICAL PROCEDURES   Final Result      XR CERVICAL SPINE (2-3 VIEWS)   Final Result      Intraoperative images as above. **This report has been created using voice recognition software. It may contain minor errors which are inherent in voice recognition technology. **      Final report electronically signed by Dr. Vasiliy Warner on 6/5/2019 2:05 PM      XR CHEST STANDARD (2 VW)   Final Result   1. Interval postsurgical changes of the chest and left shoulder. 2. Progressive coarsening of interstitial markings. Correlate with progression of interstitial disease including fibrosis of the posterior lung bases. Superimposed inflammatory bronchitis is not excluded. **This report has been created using voice recognition software. It may contain minor errors which are inherent in voice recognition technology. **      Final report electronically signed by Dr. Jaleel Euceda on 6/3/2019 11:13 PM      CT CERVICAL SPINE WO CONTRAST   Final Result       1. Endplate erosions and mild widening of the anterior disc space at the C6-7 level. This in conjunction with the findings on MRI can represent discitis/osteomyelitis. There is mild thickening of the prevertebral soft tissues at this level. **This report has been created using voice recognition software. It may contain minor errors which are inherent in voice recognition technology. **      Final report electronically signed by Dr. Paul Mitchell on 6/2/2019 8:29 AM      MRI CERVICAL SPINE W WO CONTRAST   Final Result      1. Subtle hyperintense T2 signal and enhancement is noted within the C6-7 disc space. Marrow edema and enhancement is also noted within the adjacent C6 and C7 marrow spaces. This is suspicious for discitis/osteomyelitis. There is also a small,    peripherally enhancing fluid collection extending from C6 to T1 which is suspicious for a small epidural abscess. 2. Soft tissue swelling and enhancement is noted within the prevertebral soft tissues surrounding the C6-7 level which is suspicious for infectious inflammatory changes. No defined fluid collection is identified. Note is also made of an area of signal    loss adjacent to the esophagus along the C5-6 level which may correspond to a gas-filled esophageal diverticulum. 3. Multilevel spondylotic changes are present throughout the cervical spine and are further discussed by level in the findings. Findings were conveyed to the patient's nurse, Lj Iqbal, at [phone] hours on 5/31/2019. **This report has been created using voice recognition software. It may contain minor errors which are inherent in voice recognition technology. **      Final report electronically signed by Dr. Lavern Catherine on 5/31/2019 7:58 AM      MRI COMPARISON OF OUTSIDE FILMS   Final Result             Consults:     IP CONSULT TO INFECTIOUS DISEASES  IP CONSULT TO PHARMACY  IP CONSULT TO NEUROSURGERY  IP CONSULT TO CARDIOLOGY  IP CONSULT TO GENERAL SURGERY  IP CONSULT TO GI    Disposition:    [x] Home       [] TCU       [] Rehab       [] Psych       [] SNF       [] [place]       [] Other-    Condition at Discharge: Stable    Code Status:  Full Code     Patient Instructions:     Activity: activity as tolerated  Diet: Dietary Nutrition Supplements: Wound Healing Oral Supplement  DIET CARDIAC; No Added Salt (3-4 GM)      Follow-up visits:   Ruthy Leigh  [id number] for the opportunity to be involved in this patient's care.     Electronically signed by Arnav Funes MD on 6/7/2019 at 12:17 PM

## 2019-06-07 NOTE — PROGRESS NOTES
Neurosurgery Progress Note    Patient:  Benito Benson      Unit/Bed:5K-01/001-A    YOB: 1935    MRN: [id number]     Acct: [de-identified]     Admit date: 5/29/2019    No chief complaint on file. Patient Seen, Chart, Physician notes, Labs, Radiology studies reviewed. Subjective: Patient is resting comfortably postoperative day 2 following anterior cervical decompression and fusion with biopsy of C6 7. He relates continuing resolution of prior cervical discomfort and upper extremity pain and weakness. Overall he is very happy with the outcome of the procedure. Past, Family, Social History unchanged from admission. Diet:  Dietary Nutrition Supplements: Wound Healing Oral Supplement  DIET CARDIAC; No Added Salt (3-4 GM)    Medications:  Scheduled Meds:   sodium chloride flush  10 mL Intravenous 2 times per day    docusate sodium  100 mg Oral BID    lidocaine  1 patch Transdermal Daily    Or    lidocaine  2 patch Transdermal Daily    Or    lidocaine  3 patch Transdermal Daily    [Held by provider] amLODIPine  5 mg Oral BID    [Held by provider] aspirin EC  81 mg Oral Daily    atorvastatin  40 mg Oral Nightly    isosorbide mononitrate  60 mg Oral Daily    levothyroxine  125 mcg Oral Daily    metoprolol tartrate  25 mg Oral BID    pantoprazole  40 mg Oral QAM AC    ranolazine  500 mg Oral BID    trimethoprim  100 mg Oral Daily    vitamin D  1,000 Units Oral Daily    [Held by provider] enoxaparin  40 mg Subcutaneous Daily     Continuous Infusions:  PRN Meds:sodium chloride flush, ondansetron, HYDROcodone 5 mg - acetaminophen, diphenhydrAMINE, docusate sodium, nitroGLYCERIN    Objective: Patient is sitting up in bed with the head of the bed elevated slightly more than 30°. Incision is inspected, it is dry and absent any swelling with Steri-Strips intact. She is very comfortable on exam today relating almost complete resolution of his prior cervical and upper extremity pain.   He is able to tolerate solid foods and does not have any hoarseness. Vitals: BP (!) 145/69   Pulse 69   Temp 98.1 °F (36.7 °C) (Oral)   Resp 16   Ht 5' 8\" (1.727 m)   Wt 182 lb 15.7 oz (83 kg)   SpO2 97%   BMI 27.82 kg/m²   Physical Exam:  Alert and attentive. Language appropriate, with no aphasia. Pupils equal.  Facial strength symmetric. Physical Exam   Constitutional: He is oriented to person, place, and time. He appears well-developed and well-nourished. HENT:   Head: Normocephalic and atraumatic. Eyes: Pupils are equal, round, and reactive to light. Conjunctivae and EOM are normal.   Neck: Normal range of motion. Cardiovascular: Normal rate, regular rhythm and normal heart sounds. No murmur heard. Pulmonary/Chest: Effort normal and breath sounds normal. No respiratory distress. He has no wheezes. Abdominal: Soft. Bowel sounds are normal. He exhibits no distension. There is no tenderness. Musculoskeletal: Normal range of motion. He exhibits no deformity. Neurological: He is alert and oriented to person, place, and time. He has normal strength. No sensory deficit. Skin: Skin is warm and dry. Psychiatric: He has a normal mood and affect. His behavior is normal. Judgment and thought content normal.          ROS:  Review of Systems      24 hour intake/output:    Intake/Output Summary (Last 24 hours) at 6/7/2019 1045  Last data filed at 6/7/2019 1043  Gross per 24 hour   Intake 850 ml   Output 2100 ml   Net -1250 ml     Last 3 weights:   Wt Readings from Last 3 Encounters:   06/05/19 182 lb 15.7 oz (83 kg)   12/01/11 173 lb (78.5 kg)         CBC:   Recent Labs     06/06/19  0324 06/07/19  0624   WBC 7.2 12.1*   HGB 11.9* 11.9*    269     BMP:    Recent Labs     06/05/19 1954 06/06/19 0324 06/07/19 0624    134* 139   K 4.6 4.4 4.3    101 106   CO2 21* 19* 21*   BUN 17 19 21   CREATININE 1.2 1.1 1.0   GLUCOSE 117* 204* 138*     Calcium:  Recent Labs     06/07/19  0689 may contain minor errors which are inherent in voice recognition technology. ** Final report electronically signed by Dr. Good Burch on 6/5/2019 2:05 PM    Ct Cervical Spine Wo Contrast    Result Date: 6/2/2019  PROCEDURE: CT CERVICAL SPINE WO CONTRAST CLINICAL INFORMATION: To rule out osteo abnormalities. Hypertension. Cervical fusion. COMPARISON: MR cervical spine 5/30/2019. MR cervical spine 5/28/2019. TECHNIQUE: 3 mm noncontrast axial images were obtained through the cervical spine with sagittal and coronal reconstructions. All CT scans at this facility use dose modulation, iterative reconstruction, and/or weight-based dosing when appropriate to reduce radiation dose to as low as reasonably achievable. FINDINGS: There is lucency in the disc space at the C6-7 level. There is some erosion of the endplates on the right greater than left. This is most noted in the anterior disc space. This corresponds to the area of edema on CT. This can are present osteomyelitis. The cortex of the superior endplate of C7 is indistinct. These findings can represent osteomyelitis. There is a solid fusion across the discs at the C4-C6 levels. There is 4 mm of anterolisthesis of C3 on C4. This is due to facet degenerative changes. The facet joints at this level are fused. There is some mild prevertebral soft tissue swelling anterior to the C6-7 level. There is some gas density seen in the esophagus. In the cervical soft tissues, vascular calcifications are noted. There are calcified pleural plaques bilaterally in the lung apices. There are no suspicious findings in the lung apices. 1. Endplate erosions and mild widening of the anterior disc space at the C6-7 level. This in conjunction with the findings on MRI can represent discitis/osteomyelitis. There is mild thickening of the prevertebral soft tissues at this level. **This report has been created using voice recognition software.  It may contain minor errors which are inherent in voice recognition technology. ** Final report electronically signed by Dr. Chad Tapia on 6/2/2019 8:29 AM    Mri Cervical Spine W Wo Contrast    Result Date: 5/31/2019  PROCEDURE: MRI CERVICAL SPINE W WO CONTRAST CLINICAL INFORMATION: NECK PAIN, CERVICAL SPINE, mri on 5/28 showing possible cervical spine OM/discitis. COMPARISON: None available. Correlation is made to MRI of the cervical spine dated May 28, 2019 and performed at [facility]. TECHNIQUE: Sagittal T1, T2 and STIR sequences were obtained through the cervical spine. Axial fast and echo and gradient echo T2-weighted images were obtained. Postcontrast axial and sagittal T1-weighted images were obtained. Postcontrast images were obtained after administration of 15 mL ProHance intravenous contrast. FINDINGS: The cervical spine is imaged from the craniocervical junction to the T3 level. No suspicious finding is identified at the cervical medullary junction. No abnormal signal or expansion is present within the cervical spinal cord. No abnormal enhancement is seen within the cord parenchyma. There is a subtle, peripherally enhancing collection within the epidural space posterior to the C6-T1 levels. The collection measures approximately 0.3 x 0.7 x 3.5 cm. There is straightening of the expected cervical lordosis. Anterolisthesis is noted of C3 on C4 which measures approximately 0.2 cm. There is also minimal anterolisthesis of C7 on T1. Bony fusion is noted across the C4-5 and C5-6 disc spaces. There is subtle hyperintense T2 signal and enhancement within the C6-7 disc space. Marrow edema and enhancement is also noted within the adjacent C6 and C7 marrow spaces. With regards to the disc spaces, at C2-C3, the spinal canal is patent. Uncovertebral joint spurring and facet arthropathy results in mild neural foraminal narrowing on the left without significant neural foraminal narrowing on the right. At C3-C4, there is uncovering of the disc.  The spinal canal is patent. Uncovertebral joint spurring and facet arthropathy causes mild to moderate neural foraminal narrowing bilaterally. At C4-C5, the spinal canal is patent. Uncovertebral joint spurring and facet arthropathy causes mild to moderate neural foraminal narrowing on the right and mild neural foraminal narrowing on the left. At C5-C6, the spinal canal is patent. Uncovertebral joint spurring is present causing mild bilateral neural foraminal narrowing. At C6-C7, there is a disc osteophyte complex. This results in mild spinal canal narrowing. Uncovertebral joint spurring is present causing mild bilateral neural foraminal narrowing. At C7-T1, there is a disc osteophyte complex. The spinal canal is patent. Uncovertebral joint spurring causes mild neural foraminal narrowing bilaterally. Soft tissue swelling and enhancement is noted involving the prevertebral soft tissues along the C6-7 level. No defined fluid collection is identified. Note is also made of signal loss along the right side of the esophagus at the C5-6 level which may correspond to a gas-filled esophageal diverticulum. 1. Subtle hyperintense T2 signal and enhancement is noted within the C6-7 disc space. Marrow edema and enhancement is also noted within the adjacent C6 and C7 marrow spaces. This is suspicious for discitis/osteomyelitis. There is also a small, peripherally enhancing fluid collection extending from C6 to T1 which is suspicious for a small epidural abscess. 2. Soft tissue swelling and enhancement is noted within the prevertebral soft tissues surrounding the C6-7 level which is suspicious for infectious inflammatory changes. No defined fluid collection is identified. Note is also made of an area of signal loss adjacent to the esophagus along the C5-6 level which may correspond to a gas-filled esophageal diverticulum.  3. Multilevel spondylotic changes are present throughout the cervical spine and are further discussed by level

## 2019-06-07 NOTE — PROGRESS NOTES
Eldaelsa Garcíastephanie 61  INPATIENT OCCUPATIONAL THERAPY  [facility] ONC MED 5K  EVALUATION    Time:  Time In: 935  Time Out: 1004  Timed Code Treatment Minutes: 14 Minutes  Minutes: 29          Date: 2019  Patient Name: Shanon Dang,   Gender: male      MRN: [id number]  : 1935  (81 y.o.)  Referring Practitioner: Dr. Valerie Barker  Diagnosis: acute osteomyelitis of cervical spine  Additional Pertinent Hx: 81 y.o. male was sent to [address] [place] [address] by his PCP with MRI result of cervical osteomyelitis. Patient was having neck pain and arm pain for past 3 weeks without fever or chills.   s/p ACDF C6-7 AND BIOPSY OF DISC C6-7 on 19    Restrictions/Precautions:  Surgical Protocols               Other position/activity restrictions: cervical precautions       Past Medical History:   Diagnosis Date    CAD (coronary artery disease)     Cancer ([facility] [address]     basal cell    Hyperlipidemia     Hypertension     Personal history of DVT (deep vein thrombosis)     Transverse myelitis ([facility] [address] [facility]      Past Surgical History:   Procedure Laterality Date    ANGIOPLASTY  , , , , ,    [address] [facility] x2, 1996, ,    [address]  , ,     CERVICAL FUSION  [id number]    CORONARY ANGIOPLASTY WITH STENT PLACEMENT  -2002    x3   [facility]    right hand    SHOULDER SURGERY      TONSILLECTOMY AND ADENOIDECTOMY  1942    UPPER GASTROINTESTINAL ENDOSCOPY Left 2019    EGD ESOPHAGOGASTRODUODENOSCOPY performed by Laurent Cheung MD at [address] [facility] Endoscopy           Subjective  Family / Caregiver Present: No    Subjective: cooperative, just finishing breakfast    General:       Vision: Within Functional Limits    Hearing: Within functional limits         Pain:  Pain Assessment  Patient Currently in Pain: Yes(upper back)  Pain Level: 2       Social/Functional History:  Type of Home: House  Home Layout: One level, Work area in basement  Home Access: (2 through garage with HR, 1 step in front)     Bathroom Shower/Tub: Walk-in shower  Bathroom Toilet: Handicap height  Bathroom Equipment: Shower chair       ADL Assistance: Independent  Homemaking Assistance: Needs assistance(sharies duties with wife-does cleaning)       Ambulation Assistance: Independent  Transfer Assistance: Independent          Additional Comments: No AD or A PLOF    Objective        Overall Cognitive Status: WFL         Sensation  Overall Sensation Status: WFL(Pt denies numbness and tingling )               LUE AROM (degrees)  LUE AROM : WFL  LUE General AROM: 90 degrees          RUE AROM (degrees)  RUE AROM : WFL  RUE General AROM: 90 degrees       LUE Strength  L Hand General: 4+/5                RUE Strength  R Hand General: 4+/5         ADL  Grooming: Stand by assistance(stood at sink to complete grooming)  LE Dressing: Stand by assistance(able to cross BLE to adjust slipper socks)     Bed mobility  Supine to Sit: Stand by assistance    Transfers  Sit to stand: Supervision  Stand to sit: Supervision    Balance  Sitting Balance: Independent  Standing Balance: Stand by assistance           Functional Mobility  Functional - Mobility Device: No device  Activity: To/from bathroom  Assist Level: Supervision        Activity Tolerance:  Activity Tolerance: Patient Tolerated treatment well    Treatment Initiated:  Pt ambulated around nursing station with SBA, steady without device. After education provided demo understanding of cervical precautions    Assessment:  Assessment: Pt is s/p  ACDF C6-7 AND BIOPSY OF DISC C6-7 on 6/5/19. OT for 1 session for education on cervical precautions s/p cervical sx.    Prognosis: Good    Clinical Decision Making: Clinical Decision making was of Low Complexity as the result of analysis of data from a problem focused assessment, a consideration of a limited number of treatment options, no significant comorbidities affecting the plan of care and no modification or assistance required to complete the evaluation. Discharge Recommendations:  Home with assist PRN    Patient Education:  Patient Education: OT role, POC, safety with mobility  Barriers to Learning: none    Equipment Recommendations:  Equipment Needed: No    Safety:  Safety Devices in place: Yes  Type of devices: All fall risk precautions in place, Call light within reach, Gait belt, Chair alarm in place       Plan:  Times per week: 1 session only    Goals:  Patient goals : go home today    Short term goals  Time Frame for Short term goals: 1 session  Short term goal 1: Demo good awareness of safety with cervical precautions as evidenced by verbalization of precautions during session. Long term goals  Time Frame for Long term goals : No LTG set d/t short ELOS    Evaluation Complexity: Based on the findings of patient history, examination, clinical presentation, and decision making during this evaluation, this patient is of low complexity.

## 2019-06-10 LAB
AEROBIC CULTURE: NORMAL
ANAEROBIC CULTURE: NORMAL
GRAM STAIN RESULT: NORMAL

## 2019-06-18 ENCOUNTER — OFFICE VISIT (OUTPATIENT)
Dept: INFECTIOUS DISEASES | Age: 84
End: 2019-06-18
Payer: MEDICARE

## 2019-06-18 VITALS
HEART RATE: 64 BPM | SYSTOLIC BLOOD PRESSURE: 116 MMHG | HEIGHT: 68 IN | TEMPERATURE: 97.2 F | BODY MASS INDEX: 26.61 KG/M2 | WEIGHT: 175.6 LBS | DIASTOLIC BLOOD PRESSURE: 69 MMHG

## 2019-06-18 DIAGNOSIS — M47.22 OSTEOARTHRITIS OF SPINE WITH RADICULOPATHY, CERVICAL REGION: Primary | ICD-10-CM

## 2019-06-18 PROCEDURE — 99213 OFFICE O/P EST LOW 20 MIN: CPT | Performed by: INTERNAL MEDICINE

## 2019-06-18 NOTE — PROGRESS NOTES
[facility]         Progress Note      Monika Velasquez  MEDICAL RECORD NUMBER:  [id number]  AGE: 81 y.o. GENDER: male  : 1935  EPISODE DATE:  2019    Subjective:     Chief Complaint   Patient presents with    Follow-Up from Hospital     Degenerative back disease         HISTORY of PRESENT ILLNESS HPI  He is  an 81-year-old male patient who was  recently seen at the hospital after he presented with neck pain he had degenerative back disease and there was concern for infection. His biopsy was negative for osteomyelitis or infection. He had surgery by Dr. Shante Hernandez. Today he was seen for his follow-up visit he has past history of coronary artery disease degenerative back disease hyperlipidemia and hypertension. His cultures were reviewed today which were all normal, the pathology was consistent with degenerative disease. His pain is much improved there is no drainage from his surgical wound. Has neck pain. Xiomara Abdi    PAST MEDICAL HISTORY        Diagnosis Date    CAD (coronary artery disease)     Cancer ([address]     basal cell    Hyperlipidemia     Hypertension     Personal history of DVT (deep vein thrombosis)     Transverse myelitis ([facility] [address]        PAST SURGICAL HISTORY    Past Surgical History:   Procedure Laterality Date    ANGIOPLASTY  , , , , ,     APPENDECTOMY  1938   [facility] x2, 1996, ,    [facility]  , ,     CERVICAL FUSION  [id number]    CERVICAL FUSION N/A 2019    ACDF C6-7 AND BIOPSY OF DISC C6-7 performed by Tameka Johnson MD at [place] [address]  -2002    x3   [address] [facility] [address]    right hand    [address] [facility] ENDOSCOPY Left 2019    EGD ESOPHAGOGASTRODUODENOSCOPY performed by Etta Wray MD at [facility]       FAMILY HISTORY    Family History   Problem Relation Age of Onset    Diabetes Mother     Stroke Mother     Heart Disease Father        SOCIAL HISTORY    Social History     Tobacco Use    Smoking status: Former Smoker    Smokeless tobacco: Former User     Quit date: 1/1/1967   Substance Use Topics    Alcohol use: Yes    Drug use: No       ALLERGIES    Allergies   Allergen Reactions    Lopid [Gemfibrozil] Other (See Comments)     Unable to walk    Zocor [Simvastatin] Other (See Comments)     Unable to walk    Flomax [Tamsulosin Hcl] Other (See Comments)     dizziness    Levaquin [Levofloxacin] Other (See Comments)     Patient cannot remember    Amoxicillin-Pot Clavulanate Rash       MEDICATIONS    Current Outpatient Medications on File Prior to Visit   Medication Sig Dispense Refill    docusate sodium (COLACE) 100 MG capsule Take 1 capsule by mouth 2 times daily as needed for Constipation 30 capsule 0    lidocaine 4 % external patch Place 1 patch onto the skin 2 times daily as needed (pain) 30 patch 0    levothyroxine (SYNTHROID) 125 MCG tablet Take 125 mcg by mouth Daily 1/2 tablet on empty stomach      vitamin D (CHOLECALCIFEROL) 1000 UNIT TABS tablet Take 1,000 Units by mouth daily      isosorbide mononitrate (IMDUR) 60 MG extended release tablet Take 60 mg by mouth daily      trimethoprim (TRIMPEX) 100 MG tablet Take 100 mg by mouth daily      atorvastatin (LIPITOR) 40 MG tablet Take 40 mg by mouth nightly      ranolazine (RANEXA) 500 MG extended release tablet Take 500 mg by mouth 2 times daily      pantoprazole sodium (PROTONIX) 40 MG PACK packet Take 40 mg by mouth every morning (before breakfast)      clopidogrel (PLAVIX) 75 MG tablet Take 75 mg by mouth daily.  metoprolol (LOPRESSOR) 50 MG tablet Take 25 mg by mouth 2 times daily.  aspirin EC 81 MG EC tablet Take 81 mg by mouth daily.  amlodipine (NORVASC) 10 MG tablet Take 5 mg by mouth 2 times daily.         nitroGLYCERIN (NITROSTAT) 0.4 MG SL tablet Place 0.4 mg under the tongue every 5 minutes as needed.  acetaminophen (TYLENOL) 500 MG tablet Take 500 mg by mouth every 6 hours as needed. No current facility-administered medications on file prior to visit. REVIEW OF SYSTEMS    Constitutional: no fever, no night sweats, no fatigue. Head: no head ache , no head injury, no migranes. Has neck pain  Eye: no blurring of vision, no double vision. Ears: no hearing difficulty, no tinnitus  Mouth/throat: no ulceration, dental caries , dysphagia  Lungs: no cough no chest pain  CVS: no palpitation, no chest pain, no shortness of breath  GI: no abdominal pain, no nausea , no vomiting, no constipation  FARHANA: no dysuria, frequency and urgency, no hematuria, no kidney stones  Musculoskeletal: no joint pain, swelling , stiffness,  Endocrine: no polyuria, polydipsia, no cold or heat intolerance  Hematology: no anemia, no easy brusing or bleeding, no hx of clotting disorder  Dermatology: no skin rash, no eczema, no pruritis,  Psychiatry: no depression, no anxiety,no panic attacks, no suicide ideation      Objective:      /69 (Site: Left Upper Arm, Position: Sitting, Cuff Size: Large Adult)   Pulse 64   Temp 97.2 °F (36.2 °C)   Ht 5' 7.99\" (1.727 m)   Wt 175 lb 9.6 oz (79.7 kg)   BMI 26.71 kg/m²     Wt Readings from Last 3 Encounters:   06/18/19 175 lb 9.6 oz (79.7 kg)   06/05/19 182 lb 15.7 oz (83 kg)   12/01/11 173 lb (78.5 kg)       Immunization History   Administered Date(s) Administered    Influenza, High Dose (Fluzone 65 yrs and older) 10/29/2018    Pneumococcal 13-valent Conjugate ([id number]) 05/29/2017       PHYSICAL EXAM    /69 (Site: Left Upper Arm, Position: Sitting, Cuff Size: Large Adult)   Pulse 64   Temp 97.2 °F (36.2 °C)   Ht 5' 7.99\" (1.727 m)   Wt 175 lb 9.6 oz (79.7 kg)   BMI 26.71 kg/m²   General:  Awake, alert, not in distress. HEENT: pink conjunctiva, unicteric sclera, moist oral mucosa.   He has a soft neck collar, his neck surgery has healed, Steri-Strips were noted. Chest:   Bilateral air entry. Cardiovascular:  RRR ,S1S2, no murmur or gallop. Abdomen:  Soft, non tender to palpation. Extremities:  No edema  Skin:  Warm and dry. CNS:.  Oriented to person place and time, no lateralizing sign         LABS       CBC:   Lab Results   Component Value Date    WBC 12.1 06/07/2019    HGB 11.9 06/07/2019    HCT 35.5 06/07/2019    MCV 96.7 06/07/2019     06/07/2019     BMP:   Lab Results   Component Value Date     06/07/2019    K 4.3 06/07/2019    K 5.1 05/30/2019     06/07/2019    CO2 21 06/07/2019    BUN 21 06/07/2019    CREATININE 1.0 06/07/2019     PT/INR:   Lab Results   Component Value Date    INR 0.99 06/03/2019     Prealbumin: No results found for: PREALBUMIN  Albumin:  Lab Results   Component Value Date    LABALBU 3.5 05/30/2019     Sed Rate:  Lab Results   Component Value Date    SEDRATE 27 05/29/2019     CRP:   Lab Results   Component Value Date    CRP 0.79 05/29/2019     Micro:   Lab Results   Component Value Date    BC No growth-preliminary  No growth   05/29/2019    BC No growth-preliminary  No growth   05/29/2019      Hemoglobin A1C: No results found for: LABA1C    Assessment:   Neck pain due to degenerative bone disease. No sign of infection  Lab reviewed and discussed with patient. Follow up will be scheduled as needed at the ID clinic.     Patient Active Problem List   Diagnosis Code    CAD (coronary artery disease) I25.10    Personal history of DVT (deep vein thrombosis) Z86.718    Hyperlipidemia E78.5    Hypertension I10    Stented coronary artery, last 2004 Z95.5    Transverse myelitis, 1997 G37.3    Pulmonary embolism (HCC) I26.99    Angina pectoris, unstable ([address] I20.0    Atrophic kidney N26.1    Chest pain R07.9    Chronic kidney insufficiency, stage 3 (moderate) (HCC) N18.3    Essential hypertension, benign I10    History of pulmonary embolism Z86.711    Hypothyroidism E03.9    IFG (impaired fasting glucose) R73.01    MRSA nasal colonization Z22.322    Neurogenic bladder N31.9    Pulmonary fibrosis (HCC) J84.10    Recurrent UTI N39.0    Zenker's diverticulum K22.5    ASHD (arteriosclerotic heart disease) I25.10    Stridor R06.1    Degenerative disc disease, cervical M50.30         Plan:     Patient examined and evaluated    Please see attached Discharge Instructions    Written patient dismissal instructions given to patient and signed by patient or POA.              Electronically signed by Sharron Hitchcock MD,FACP@ TD@ at 2:57 PM

## 2019-06-20 ENCOUNTER — OFFICE VISIT (OUTPATIENT)
Dept: NEUROSURGERY | Age: 84
End: 2019-06-20

## 2019-06-20 VITALS
BODY MASS INDEX: 26.4 KG/M2 | DIASTOLIC BLOOD PRESSURE: 68 MMHG | WEIGHT: 174.16 LBS | HEART RATE: 76 BPM | HEIGHT: 68 IN | SYSTOLIC BLOOD PRESSURE: 118 MMHG

## 2019-06-20 DIAGNOSIS — M54.12 CERVICAL RADICULOPATHY: Primary | ICD-10-CM

## 2019-06-20 PROCEDURE — 99024 POSTOP FOLLOW-UP VISIT: CPT | Performed by: PHYSICIAN ASSISTANT

## 2019-08-07 DIAGNOSIS — M54.12 CERVICAL RADICULOPATHY: ICD-10-CM

## 2019-08-13 ENCOUNTER — HOSPITAL ENCOUNTER (OUTPATIENT)
Dept: CT IMAGING | Age: 84
Discharge: HOME | End: 2019-08-13
Payer: MEDICARE

## 2019-08-13 DIAGNOSIS — Z00.6 EXAMINATION FOR NORMAL COMPARISON FOR CLINICAL RESEARCH: ICD-10-CM

## 2019-08-13 PROCEDURE — 3209999900 CT COMPARISON OF OUTSIDE FILMS

## 2019-08-15 ENCOUNTER — OFFICE VISIT (OUTPATIENT)
Dept: NEUROSURGERY | Age: 84
End: 2019-08-15

## 2019-08-15 VITALS
HEIGHT: 68 IN | BODY MASS INDEX: 26.89 KG/M2 | SYSTOLIC BLOOD PRESSURE: 112 MMHG | DIASTOLIC BLOOD PRESSURE: 68 MMHG | WEIGHT: 177.4 LBS

## 2019-08-15 DIAGNOSIS — M54.12 CERVICAL RADICULOPATHY: Primary | ICD-10-CM

## 2019-08-15 PROCEDURE — 99024 POSTOP FOLLOW-UP VISIT: CPT | Performed by: PHYSICIAN ASSISTANT

## 2019-08-15 RX ORDER — FLUTICASONE PROPIONATE 50 MCG
1 SPRAY, SUSPENSION (ML) NASAL DAILY
COMMUNITY
Start: 2019-07-26

## 2019-08-15 RX ORDER — PREDNISONE 20 MG/1
40 TABLET ORAL DAILY
COMMUNITY
Start: 2019-08-13 | End: 2019-08-18

## 2019-08-15 NOTE — PROGRESS NOTES
[address] [place] [address] [address] [facility] [address]  Dept: [phone]  Dept Fax: [phone]  Loc: [facility] Follow Visit  Visit Date: 8/15/2019      Fatimah Zamorano  is a 81 y.o. male who is returning to the office today for a post-op follow-up visit. He had surgery on 06/05/2019 with Dr. Francisco Yin to undergo an ACDF of C6/7 with concurrent biopsy, without complication. He presents today accompanied by his wife. He remains happy with the outcome of the procedure with his prior UE pain resolved. He denies dysphagia with complaints of mild hoarseness. His incision is well healed. He is otherwise stable and grossly neurologically intact on exam.  He presents with a CT of the cervical spine imaged on 08/05/2019 that reveals an intact construct. Based on exam and image findings we are scheduling him for a follow-up in 3 months with a new CT for review. He and his wife are encouraged to contact our office with any questions re this visit, his prior surgery or should he experience any significant changes in his general health.        · Patient was evaluated today and is doing very well overall. · No new complaints were voiced. · Patient  lives with their spouse  · Wound: wound healing as expected  · Follow-up Studies: No orders of the defined types were placed in this encounter. ·      Assessment/Plan:  · Status Post C6/7 ACDF   · Doing very well overall  · Encouraged gradual increase in physical and mental activity. · Fall precaution and home safety education provided to patient. · Follow-up: Based on exam and image findings we are scheduling him for a follow-up in 3 months with a new CT for review. He and his wife are encouraged to contact our office with any questions re this visit, his prior surgery or should he experience any significant changes in his general health.          Electronically signed by Mitzy Yañez PA-C on

## 2019-09-19 NOTE — H&P
(coronary artery disease)     Cancer (HCC)     basal cell    Hyperlipidemia     Hypertension     Personal history of DVT (deep vein thrombosis)     Transverse myelitis ([facility] [address] 1997     Past Surgical History:        Procedure Laterality Date    ANGIOPLASTY  1991, 1992, 1993, 1996, 2000, 2004    APPENDECTOMY  1938   [address] x2, 1996, 1997, 2010   [address]  2001, 2006, 2009   Christianne Barrs CERVICAL FUSION  [id number]    CERVICAL FUSION N/A 6/5/2019    ACDF C6-7 AND BIOPSY OF DISC C6-7 performed by Raquel Huerta MD at [place] [address]  2000-2002    x3   [address] [facility] [address]    right hand    SHOULDER SURGERY      TONSILLECTOMY AND ADENOIDECTOMY  1942    UPPER GASTROINTESTINAL ENDOSCOPY Left 6/4/2019    EGD ESOPHAGOGASTRODUODENOSCOPY performed by Zachary Bell MD at [facility]     Allergies:  Lopid [gemfibrozil]; Zocor [simvastatin]; Flomax [tamsulosin hcl]; Levaquin [levofloxacin]; and Amoxicillin-pot clavulanate  Home Meds:  Prior to Admission medications    Medication Sig Start Date End Date Taking?  Authorizing Provider   tiotropium (SPIRIVA RESPIMAT) 2.5 MCG/ACT AERS inhaler Inhale 2 puffs into the lungs daily 7/26/19  Yes Historical Provider, MD   VENTOLIN  (90 Base) MCG/ACT inhaler Take 1 puff by mouth daily 7/9/19  Yes Historical Provider, MD   levothyroxine (SYNTHROID) 125 MCG tablet Take 125 mcg by mouth Daily 1/2 tablet on empty stomach   Yes Historical Provider, MD   vitamin D (CHOLECALCIFEROL) 1000 UNIT TABS tablet Take 1,000 Units by mouth daily   Yes Historical Provider, MD   isosorbide mononitrate (IMDUR) 60 MG extended release tablet Take 60 mg by mouth daily   Yes Historical Provider, MD   trimethoprim (TRIMPEX) 100 MG tablet Take 100 mg by mouth daily   Yes Historical Provider, MD   atorvastatin (LIPITOR) 40 MG tablet Take 40 mg by mouth nightly   Yes Historical Provider, MD   ranolazine (RANEXA) 500 MG extended release

## 2019-09-20 ENCOUNTER — ANESTHESIA EVENT (OUTPATIENT)
Dept: ENDOSCOPY | Age: 84
End: 2019-09-20
Payer: MEDICARE

## 2019-09-20 ENCOUNTER — APPOINTMENT (OUTPATIENT)
Dept: GENERAL RADIOLOGY | Age: 84
End: 2019-09-20
Attending: INTERNAL MEDICINE
Payer: MEDICARE

## 2019-09-20 ENCOUNTER — HOSPITAL ENCOUNTER (OUTPATIENT)
Age: 84
Setting detail: OUTPATIENT SURGERY
Discharge: ANOTHER HOSPITAL | End: 2019-09-20
Attending: INTERNAL MEDICINE | Admitting: INTERNAL MEDICINE
Payer: MEDICARE

## 2019-09-20 ENCOUNTER — HOSPITAL ENCOUNTER (EMERGENCY)
Age: 84
Discharge: HOME | End: 2019-09-20
Attending: FAMILY MEDICINE
Payer: MEDICARE

## 2019-09-20 ENCOUNTER — HOSPITAL ENCOUNTER (EMERGENCY)
Dept: GENERAL RADIOLOGY | Age: 84
Discharge: HOME | End: 2019-09-20
Payer: MEDICARE

## 2019-09-20 ENCOUNTER — ANESTHESIA (OUTPATIENT)
Dept: ENDOSCOPY | Age: 84
End: 2019-09-20
Payer: MEDICARE

## 2019-09-20 VITALS
SYSTOLIC BLOOD PRESSURE: 160 MMHG | DIASTOLIC BLOOD PRESSURE: 71 MMHG | RESPIRATION RATE: 16 BRPM | OXYGEN SATURATION: 99 %

## 2019-09-20 VITALS
DIASTOLIC BLOOD PRESSURE: 74 MMHG | HEIGHT: 68 IN | BODY MASS INDEX: 26.52 KG/M2 | OXYGEN SATURATION: 97 % | RESPIRATION RATE: 18 BRPM | SYSTOLIC BLOOD PRESSURE: 145 MMHG | WEIGHT: 175 LBS | HEART RATE: 78 BPM

## 2019-09-20 VITALS
RESPIRATION RATE: 20 BRPM | WEIGHT: 178 LBS | HEIGHT: 68 IN | DIASTOLIC BLOOD PRESSURE: 101 MMHG | OXYGEN SATURATION: 95 % | SYSTOLIC BLOOD PRESSURE: 169 MMHG | BODY MASS INDEX: 26.98 KG/M2 | TEMPERATURE: 97.7 F | HEART RATE: 69 BPM

## 2019-09-20 DIAGNOSIS — R05.3 COUGH, PERSISTENT: Primary | ICD-10-CM

## 2019-09-20 DIAGNOSIS — R05.9 COUGH: ICD-10-CM

## 2019-09-20 LAB
ALBUMIN SERPL-MCNC: 4.2 G/DL (ref 3.5–5.1)
ALLEN TEST: POSITIVE
ALP BLD-CCNC: 78 U/L (ref 38–126)
ALT SERPL-CCNC: 26 U/L (ref 11–66)
ANION GAP SERPL CALCULATED.3IONS-SCNC: 16 MEQ/L (ref 8–16)
AST SERPL-CCNC: 24 U/L (ref 5–40)
BASE EXCESS (CALCULATED): -4.2 MMOL/L (ref -2.5–2.5)
BASOPHILS # BLD: 0.3 %
BASOPHILS ABSOLUTE: 0 THOU/MM3 (ref 0–0.1)
BILIRUB SERPL-MCNC: 0.6 MG/DL (ref 0.3–1.2)
BILIRUBIN DIRECT: < 0.2 MG/DL (ref 0–0.3)
BUN BLDV-MCNC: 18 MG/DL (ref 7–22)
CALCIUM SERPL-MCNC: 9.5 MG/DL (ref 8.5–10.5)
CHLORIDE BLD-SCNC: 103 MEQ/L (ref 98–111)
CO2: 20 MEQ/L (ref 23–33)
COLLECTED BY:: ABNORMAL
CREAT SERPL-MCNC: 1.4 MG/DL (ref 0.4–1.2)
DEVICE: ABNORMAL
EOSINOPHIL # BLD: 2.5 %
EOSINOPHILS ABSOLUTE: 0.3 THOU/MM3 (ref 0–0.4)
ERYTHROCYTE [DISTWIDTH] IN BLOOD BY AUTOMATED COUNT: 13 % (ref 11.5–14.5)
ERYTHROCYTE [DISTWIDTH] IN BLOOD BY AUTOMATED COUNT: 47.2 FL (ref 35–45)
GFR SERPL CREATININE-BSD FRML MDRD: 48 ML/MIN/1.73M2
GLUCOSE BLD-MCNC: 106 MG/DL (ref 70–108)
HCO3: 18 MMOL/L (ref 23–28)
HCT VFR BLD CALC: 44.3 % (ref 42–52)
HEMOGLOBIN: 14.4 GM/DL (ref 14–18)
IFIO2: 8
IMMATURE GRANS (ABS): 0.04 THOU/MM3 (ref 0–0.07)
IMMATURE GRANULOCYTES: 0 %
LYMPHOCYTES # BLD: 35.3 %
LYMPHOCYTES ABSOLUTE: 4.1 THOU/MM3 (ref 1–4.8)
MCH RBC QN AUTO: 31.9 PG (ref 26–33)
MCHC RBC AUTO-ENTMCNC: 32.5 GM/DL (ref 32.2–35.5)
MCV RBC AUTO: 98 FL (ref 80–94)
MONOCYTES # BLD: 11 %
MONOCYTES ABSOLUTE: 1.3 THOU/MM3 (ref 0.4–1.3)
NUCLEATED RED BLOOD CELLS: 0 /100 WBC
O2 SATURATION: 98 %
OSMOLALITY CALCULATION: 279.9 MOSMOL/KG (ref 275–300)
PCO2: 26 MMHG (ref 35–45)
PH BLOOD GAS: 7.45 (ref 7.35–7.45)
PLATELET # BLD: 253 THOU/MM3 (ref 130–400)
PMV BLD AUTO: 9.8 FL (ref 9.4–12.4)
PO2: 102 MMHG (ref 71–104)
POTASSIUM SERPL-SCNC: 5.5 MEQ/L (ref 3.5–5.2)
RBC # BLD: 4.52 MILL/MM3 (ref 4.7–6.1)
SEG NEUTROPHILS: 50.6 %
SEGMENTED NEUTROPHILS ABSOLUTE COUNT: 5.9 THOU/MM3 (ref 1.8–7.7)
SODIUM BLD-SCNC: 139 MEQ/L (ref 135–145)
SOURCE, BLOOD GAS: ABNORMAL
TOTAL PROTEIN: 7.4 G/DL (ref 6.1–8)
WBC # BLD: 11.7 THOU/MM3 (ref 4.8–10.8)

## 2019-09-20 PROCEDURE — 36415 COLL VENOUS BLD VENIPUNCTURE: CPT

## 2019-09-20 PROCEDURE — 2500000003 HC RX 250 WO HCPCS: Performed by: SPECIALIST

## 2019-09-20 PROCEDURE — 99283 EMERGENCY DEPT VISIT LOW MDM: CPT

## 2019-09-20 PROCEDURE — 71045 X-RAY EXAM CHEST 1 VIEW: CPT

## 2019-09-20 PROCEDURE — 3700000001 HC ADD 15 MINUTES (ANESTHESIA): Performed by: INTERNAL MEDICINE

## 2019-09-20 PROCEDURE — 85025 COMPLETE CBC W/AUTO DIFF WBC: CPT

## 2019-09-20 PROCEDURE — 7100000000 HC PACU RECOVERY - FIRST 15 MIN: Performed by: INTERNAL MEDICINE

## 2019-09-20 PROCEDURE — 6360000002 HC RX W HCPCS: Performed by: FAMILY MEDICINE

## 2019-09-20 PROCEDURE — 2709999900 HC NON-CHARGEABLE SUPPLY

## 2019-09-20 PROCEDURE — 82248 BILIRUBIN DIRECT: CPT

## 2019-09-20 PROCEDURE — 36600 WITHDRAWAL OF ARTERIAL BLOOD: CPT

## 2019-09-20 PROCEDURE — 2709999900 HC NON-CHARGEABLE SUPPLY: Performed by: INTERNAL MEDICINE

## 2019-09-20 PROCEDURE — 6370000000 HC RX 637 (ALT 250 FOR IP): Performed by: FAMILY MEDICINE

## 2019-09-20 PROCEDURE — 82803 BLOOD GASES ANY COMBINATION: CPT

## 2019-09-20 PROCEDURE — 2580000003 HC RX 258: Performed by: INTERNAL MEDICINE

## 2019-09-20 PROCEDURE — 96374 THER/PROPH/DIAG INJ IV PUSH: CPT

## 2019-09-20 PROCEDURE — 80053 COMPREHEN METABOLIC PANEL: CPT

## 2019-09-20 PROCEDURE — 3609012700 HC EGD DILATION SAVORY: Performed by: INTERNAL MEDICINE

## 2019-09-20 PROCEDURE — 3609012400 HC EGD TRANSORAL BIOPSY SINGLE/MULTIPLE: Performed by: INTERNAL MEDICINE

## 2019-09-20 PROCEDURE — 88305 TISSUE EXAM BY PATHOLOGIST: CPT

## 2019-09-20 PROCEDURE — 94640 AIRWAY INHALATION TREATMENT: CPT

## 2019-09-20 PROCEDURE — 3700000000 HC ANESTHESIA ATTENDED CARE: Performed by: INTERNAL MEDICINE

## 2019-09-20 PROCEDURE — 7100000001 HC PACU RECOVERY - ADDTL 15 MIN: Performed by: INTERNAL MEDICINE

## 2019-09-20 PROCEDURE — 6360000002 HC RX W HCPCS: Performed by: SPECIALIST

## 2019-09-20 RX ORDER — LIDOCAINE HYDROCHLORIDE 20 MG/ML
INJECTION, SOLUTION EPIDURAL; INFILTRATION; INTRACAUDAL; PERINEURAL PRN
Status: DISCONTINUED | OUTPATIENT
Start: 2019-09-20 | End: 2019-09-20 | Stop reason: SDUPTHER

## 2019-09-20 RX ORDER — SODIUM CHLORIDE FOR INHALATION 0.9 %
3 VIAL, NEBULIZER (ML) INHALATION EVERY 4 HOURS PRN
Status: DISCONTINUED | OUTPATIENT
Start: 2019-09-20 | End: 2019-09-20 | Stop reason: HOSPADM

## 2019-09-20 RX ORDER — PROPOFOL 10 MG/ML
INJECTION, EMULSION INTRAVENOUS PRN
Status: DISCONTINUED | OUTPATIENT
Start: 2019-09-20 | End: 2019-09-20 | Stop reason: SDUPTHER

## 2019-09-20 RX ORDER — SODIUM CHLORIDE 450 MG/100ML
INJECTION, SOLUTION INTRAVENOUS CONTINUOUS
Status: DISCONTINUED | OUTPATIENT
Start: 2019-09-20 | End: 2019-09-20 | Stop reason: HOSPADM

## 2019-09-20 RX ORDER — GLYCOPYRROLATE 1 MG/5 ML
SYRINGE (ML) INTRAVENOUS PRN
Status: DISCONTINUED | OUTPATIENT
Start: 2019-09-20 | End: 2019-09-20 | Stop reason: SDUPTHER

## 2019-09-20 RX ADMIN — LIDOCAINE HYDROCHLORIDE 100 MG: 20 INJECTION, SOLUTION EPIDURAL; INFILTRATION; INTRACAUDAL; PERINEURAL at 13:11

## 2019-09-20 RX ADMIN — Medication 0.1 MG: at 13:10

## 2019-09-20 RX ADMIN — HYDROCORTISONE SODIUM SUCCINATE 100 MG: 100 INJECTION, POWDER, FOR SOLUTION INTRAMUSCULAR; INTRAVENOUS at 14:58

## 2019-09-20 RX ADMIN — SODIUM CHLORIDE: 4.5 INJECTION, SOLUTION INTRAVENOUS at 12:45

## 2019-09-20 RX ADMIN — PROPOFOL 120 MG: 10 INJECTION, EMULSION INTRAVENOUS at 13:11

## 2019-09-20 RX ADMIN — RACEPINEPHRINE HYDROCHLORIDE 11.25 MG: 11.25 SOLUTION RESPIRATORY (INHALATION) at 14:50

## 2019-09-20 ASSESSMENT — PAIN SCALES - GENERAL
PAINLEVEL_OUTOF10: 0

## 2019-09-20 ASSESSMENT — ENCOUNTER SYMPTOMS
DYSPNEA ACTIVITY LEVEL: AFTER AMBULATING 1 FLIGHT OF STAIRS
STRIDOR: 1
SHORTNESS OF BREATH: 1
COUGH: 1

## 2019-09-20 ASSESSMENT — PAIN - FUNCTIONAL ASSESSMENT: PAIN_FUNCTIONAL_ASSESSMENT: 0-10

## 2019-09-20 NOTE — OP NOTE
moderate systemic disease with functional limitations    MEDICATION:    MAC/Propofol/Anesthesia:  Yes     Photo:  Yes  Biopsy:  Yes      Description of Procedure: The risks and benefits of the procedure were described to the patient or their representative if unable to give consent including but not limited to bleeding, infection, poking a hole someplace requiring surgery to fix it, having a reaction to medication, and death. The patient or their representative if unable to give consent understood these risks and provided informed consent. Airway was assessed and is adequate for the planned sedation. Anesthesia: MAC  Estimated Blood Loss: less than 50   Complications: None  Specimens: Was Obtained:  see below     Sedation was administered by anesthesia who monitored the patient during the procedure. The patient was placed in the left lateral decubitus position. A forward-viewing Olympus endoscope was lubricated and inserted through the mouth into the oropharynx. Under direct visualization, the upper esophagus was intubated. The scope was advanced to the esophagus and stomach to second portion of duodenum. Scope was slowly withdrawn with good views of mucosal surfaces. The scope was retroflexed in the fundus. Findings and maneuvers are listed in impression below. The patient tolerated the procedure well. The scope was removed. There were no immediate complications. IMPRESSION:  1. Esophagus - normal   2. Stomach - Mild antral erythema, biopsied for Hpylori using Maximo criteria  3. Duodenum - normal   4. Empiric dilation performed of the esophagus with a 60 [employer] dilator over a guidewire. There is little resistance at the UES on passing the dilator. Upon removal, the tips of dilator and guidewire are intact. The endoscope was reinserted. There is a small mucosal tear near the UES . 5.  Small diverticulum in gastric fundus.   6.  Unable to visualized Zenkers, but pt coughed the entire exam and for 15\" after. RECOMMENDATIONS:    1. Await pathology   2. Post-esophageal dilation recommendations including soft diet and cepacol spray or lozenges as needed for sore throat. 3.  Follow up with Orlando Mayorga CNP in 6-8 weeks. 4.  Continue pantoprazole daily. 5.  Resume aspirin today and Plavix in one week. 6.  If dysphagia continues, I recommend referral to tertiary center. No further endoscopies as too high risk.     Frank Osborn MD  1:30 PM 9/20/2019

## 2019-09-20 NOTE — ANESTHESIA PRE PROCEDURE
artery disease)     Cancer ([facility] [address]     basal cell    Hyperlipidemia     Hypertension     Personal history of DVT (deep vein thrombosis)     Transverse myelitis ([facility] [address] 1997       Past Surgical History:        Procedure Laterality Date    ANGIOPLASTY  1991, 1992, 1993, 1996, 2000, 2004    APPENDECTOMY  1938   [address] [facility] x2, 1996, 1997, 2010   [address]  2001, 2006, 2009    CERVICAL FUSION  [id number]    CERVICAL FUSION N/A 6/5/2019    ACDF C6-7 AND BIOPSY OF DISC C6-7 performed by Janice Garcia MD at [facility]  2000-2002    x3   2010 [facility]    right hand    [address]    UPPER GASTROINTESTINAL ENDOSCOPY Left 6/4/2019    EGD ESOPHAGOGASTRODUODENOSCOPY performed by Parvez Last MD at [address] [facility] Endoscopy       Social History:    Social History     Tobacco Use    Smoking status: Former Smoker    Smokeless tobacco: Former User     Quit date: 1/1/1967   Substance Use Topics    Alcohol use: Yes                                Counseling given: Not Answered      Vital Signs (Current):   Vitals:    09/20/19 1200   BP: 119/72   Pulse: 61   Resp: 16   Temp: 35.9 °C (96.7 °F)   TempSrc: Temporal   SpO2: 97%   Weight: 178 lb (80.7 kg)   Height: 5' 8\" (1.727 m)                                              BP Readings from Last 3 Encounters:   09/20/19 119/72   08/15/19 112/68   06/20/19 118/68       NPO Status: Time of last liquid consumption: 0830                        Time of last solid consumption: 1800                        Date of last liquid consumption: 09/20/19                        Date of last solid food consumption: 09/19/19    BMI:   Wt Readings from Last 3 Encounters:   09/20/19 178 lb (80.7 kg)   08/15/19 177 lb 6.4 oz (80.5 kg)   06/20/19 174 lb 2.6 oz (79 kg)     Body mass index is 27.06 kg/m².     CBC:   Lab Results   Component Value Date    WBC 12.1 06/07/2019    RBC 3.67 06/07/2019    RBC 4.40 11/22/2011    HGB 11.9 06/07/2019    HCT 35.5 06/07/2019    MCV 96.7 06/07/2019    RDW 13.0 11/22/2011     06/07/2019       CMP:   Lab Results   Component Value Date     06/07/2019    K 4.3 06/07/2019    K 5.1 05/30/2019     06/07/2019    CO2 21 06/07/2019    BUN 21 06/07/2019    CREATININE 1.0 06/07/2019    LABGLOM 71 06/07/2019    GLUCOSE 138 06/07/2019    GLUCOSE 106 12/11/2011    PROT 6.5 05/30/2019    CALCIUM 8.8 06/07/2019    BILITOT 0.5 05/30/2019    ALKPHOS 79 05/30/2019    AST 22 05/30/2019    ALT 32 05/30/2019       POC Tests: No results for input(s): POCGLU, POCNA, POCK, POCCL, POCBUN, POCHEMO, POCHCT in the last 72 hours. Coags:   Lab Results   Component Value Date    INR 0.99 06/03/2019    APTT 36.6 06/03/2019       HCG (If Applicable): No results found for: PREGTESTUR, PREGSERUM, HCG, HCGQUANT     ABGs: No results found for: PHART, PO2ART, FBV6QZY, MIJ6TPM, BEART, E4EZXYLV     Type & Screen (If Applicable):  Lab Results   Component Value Date    [address] 06/03/2019       Anesthesia Evaluation  Patient summary reviewed and Nursing notes reviewed  Airway: Mallampati: II  TM distance: >3 FB   Neck ROM: full  Mouth opening: > = 3 FB Dental: normal exam         Pulmonary:   (+) shortness of breath: chronic,  decreased breath sounds,                             Cardiovascular:  Exercise tolerance: poor (<4 METS),   (+) hypertension: moderate, angina: no interval change, CAD:, ROBINS: after ambulating 1 flight of stairs,         Rhythm: regular  Rate: normal                    Neuro/Psych:   Negative Neuro/Psych ROS              GI/Hepatic/Renal:   (+) GERD: well controlled, renal disease: CRI,           Endo/Other:    (+) hypothyroidism, blood dyscrasia: anemia:., malignancy/cancer. (-) arthritis        Pt had no PAT visit       Abdominal:           Vascular:   + DVT, PE.                                      Anesthesia Plan      MAC     ASA 3       Induction:

## 2019-09-20 NOTE — ED PROVIDER NOTES
persistent          DISPOSITION/PLAN     Discharge home    Call ENT office to adjust appointment time if conflict with other scheduled procedure occurs    PATIENT REFERRED TO:  John Alonzo MD  [address]/Deshawn Bae [address]  [phone]    On 9/24/2019  Appointment scheduled for Tuesday, 9/24/2019 at 0830.   Call the office to change the appointment if the conflict with colonoscopy persists      DISCHARGE MEDICATIONS:  Discharge Medication List as of 9/20/2019  4:43 PM          (Please note that portions of this note were completed with a voice recognition program.  Efforts were made to edit the dictations but occasionally words are mis-transcribed.)    MD Stef Suarez MD  09/20/19 Yamilka Medina

## 2019-09-20 NOTE — PROGRESS NOTES
EGD with dilation complete. Size 60 Fr American dilator used. Biopsies taken. One specimen jar labeled and sent to lab. Photos taken.

## 2019-09-24 ENCOUNTER — OFFICE VISIT (OUTPATIENT)
Dept: ENT CLINIC | Age: 84
End: 2019-09-24
Payer: MEDICARE

## 2019-09-24 VITALS
DIASTOLIC BLOOD PRESSURE: 68 MMHG | BODY MASS INDEX: 27.08 KG/M2 | SYSTOLIC BLOOD PRESSURE: 122 MMHG | RESPIRATION RATE: 16 BRPM | HEIGHT: 68 IN | HEART RATE: 72 BPM | WEIGHT: 178.7 LBS

## 2019-09-24 DIAGNOSIS — J18.9 PNEUMONIA OF BOTH LUNGS DUE TO INFECTIOUS ORGANISM, UNSPECIFIED PART OF LUNG: ICD-10-CM

## 2019-09-24 DIAGNOSIS — J38.00 VOCAL CORD PARALYSIS: ICD-10-CM

## 2019-09-24 DIAGNOSIS — R05.8 COUGH PRODUCTIVE OF PURULENT SPUTUM: Primary | ICD-10-CM

## 2019-09-24 PROCEDURE — G8598 ASA/ANTIPLAT THER USED: HCPCS | Performed by: OTOLARYNGOLOGY

## 2019-09-24 PROCEDURE — 1123F ACP DISCUSS/DSCN MKR DOCD: CPT | Performed by: OTOLARYNGOLOGY

## 2019-09-24 PROCEDURE — G8427 DOCREV CUR MEDS BY ELIG CLIN: HCPCS | Performed by: OTOLARYNGOLOGY

## 2019-09-24 PROCEDURE — G8419 CALC BMI OUT NRM PARAM NOF/U: HCPCS | Performed by: OTOLARYNGOLOGY

## 2019-09-24 PROCEDURE — 99203 OFFICE O/P NEW LOW 30 MIN: CPT | Performed by: OTOLARYNGOLOGY

## 2019-09-24 PROCEDURE — 1036F TOBACCO NON-USER: CPT | Performed by: OTOLARYNGOLOGY

## 2019-09-24 PROCEDURE — 4040F PNEUMOC VAC/ADMIN/RCVD: CPT | Performed by: OTOLARYNGOLOGY

## 2019-09-24 PROCEDURE — 31575 DIAGNOSTIC LARYNGOSCOPY: CPT | Performed by: OTOLARYNGOLOGY

## 2019-09-24 RX ORDER — AZITHROMYCIN 250 MG/1
TABLET, FILM COATED ORAL
Qty: 2 PACKET | Refills: 0 | Status: SHIPPED | OUTPATIENT
Start: 2019-09-24

## 2019-09-24 ASSESSMENT — ENCOUNTER SYMPTOMS
FACIAL SWELLING: 0
WHEEZING: 1
NAUSEA: 0
RHINORRHEA: 1
APNEA: 0
TROUBLE SWALLOWING: 0
SORE THROAT: 0
VOICE CHANGE: 0
CHEST TIGHTNESS: 0
ABDOMINAL PAIN: 0
COLOR CHANGE: 0
DIARRHEA: 0
SHORTNESS OF BREATH: 0
STRIDOR: 0
VOMITING: 0
SINUS PRESSURE: 1
COUGH: 1
CHOKING: 0

## 2019-09-24 NOTE — PROGRESS NOTES
Negative for adenopathy. Does not bruise/bleed easily. Psychiatric/Behavioral: Negative for confusion and sleep disturbance. The patient is not nervous/anxious. Objective:   /68 (Site: Left Upper Arm, Position: Sitting)   Pulse 72   Resp 16   Ht 5' 8\" (1.727 m)   Wt 178 lb 11.2 oz (81.1 kg)   BMI 27.17 kg/m²     Physical Exam   Constitutional: He is oriented to person, place, and time. He appears well-developed and well-nourished. He is cooperative. HENT:   Head: Normocephalic and atraumatic. Head is without laceration. Right Ear: Hearing, external ear and ear canal normal. No drainage or swelling. Tympanic membrane is not perforated and not erythematous. No middle ear effusion. Left Ear: Hearing, tympanic membrane, external ear and ear canal normal. No drainage or swelling. Tympanic membrane is not perforated and not erythematous. No middle ear effusion. Nose: Nose normal. No mucosal edema, rhinorrhea or septal deviation. Mouth/Throat: Uvula is midline, oropharynx is clear and moist and mucous membranes are normal. Mucous membranes are not pale and not dry. No oral lesions. No uvula swelling. No oropharyngeal exudate, posterior oropharyngeal edema or posterior oropharyngeal erythema. Turbinates: normal  LIps: lips normal    Teeth and gums:good dentition   Mallampati 1  Tonsils:Unremarkable  Base of tongue: symmetric,  Larynx, mirror exam: unable due to gag reflex     Eyes: Right eye exhibits normal extraocular motion and no nystagmus. Left eye exhibits normal extraocular motion and no nystagmus. Conjugate gaze   Neck: Trachea normal and phonation normal. Neck supple. No tracheal deviation present. No thyroid mass and no thyromegaly present. No adenopathy. Salivary glands not enlarged and normal to palpation     Pulmonary/Chest: Effort normal. No stridor. Neurological: He is alert and oriented to person, place, and time.  No cranial nerve deficit (VIIth N function intact

## 2019-11-20 ENCOUNTER — HOSPITAL ENCOUNTER (OUTPATIENT)
Dept: CT IMAGING | Age: 84
Discharge: HOME | End: 2019-11-20
Payer: MEDICARE

## 2019-11-20 DIAGNOSIS — Z00.6 ENCOUNTER FOR EXAMINATION FOR NORMAL COMPARISON AND CONTROL IN CLINICAL RESEARCH PROGRAM: ICD-10-CM

## 2019-11-20 DIAGNOSIS — M54.12 CERVICAL RADICULOPATHY: ICD-10-CM

## 2019-11-20 PROCEDURE — 3209999900 CT COMPARISON OF OUTSIDE FILMS

## 2019-11-21 ENCOUNTER — OFFICE VISIT (OUTPATIENT)
Dept: NEUROSURGERY | Age: 84
End: 2019-11-21
Payer: MEDICARE

## 2019-11-21 VITALS
DIASTOLIC BLOOD PRESSURE: 78 MMHG | WEIGHT: 179.9 LBS | SYSTOLIC BLOOD PRESSURE: 126 MMHG | BODY MASS INDEX: 27.26 KG/M2 | HEIGHT: 68 IN

## 2019-11-21 DIAGNOSIS — M54.12 CERVICAL RADICULOPATHY: Primary | ICD-10-CM

## 2019-11-21 PROCEDURE — G8417 CALC BMI ABV UP PARAM F/U: HCPCS | Performed by: PHYSICIAN ASSISTANT

## 2019-11-21 PROCEDURE — 1036F TOBACCO NON-USER: CPT | Performed by: PHYSICIAN ASSISTANT

## 2019-11-21 PROCEDURE — 1123F ACP DISCUSS/DSCN MKR DOCD: CPT | Performed by: PHYSICIAN ASSISTANT

## 2019-11-21 PROCEDURE — G8598 ASA/ANTIPLAT THER USED: HCPCS | Performed by: PHYSICIAN ASSISTANT

## 2019-11-21 PROCEDURE — 4040F PNEUMOC VAC/ADMIN/RCVD: CPT | Performed by: PHYSICIAN ASSISTANT

## 2019-11-21 PROCEDURE — 99213 OFFICE O/P EST LOW 20 MIN: CPT | Performed by: PHYSICIAN ASSISTANT

## 2019-11-21 PROCEDURE — G8427 DOCREV CUR MEDS BY ELIG CLIN: HCPCS | Performed by: PHYSICIAN ASSISTANT

## 2019-11-21 PROCEDURE — G8484 FLU IMMUNIZE NO ADMIN: HCPCS | Performed by: PHYSICIAN ASSISTANT

## 2020-01-30 NOTE — PROGRESS NOTES
Walked in the rosas the [facility] and [facility]. He tolerated it well, stated that walking in the rosas helps his back pain. Complex Repair And O-L Flap Text: The defect edges were debeveled with a #15 scalpel blade.  The primary defect was closed partially with a complex linear closure.  Given the location of the remaining defect, shape of the defect and the proximity to free margins an O-L flap was deemed most appropriate for complete closure of the defect.  Using a sterile surgical marker, an appropriate flap was drawn incorporating the defect and placing the expected incisions within the relaxed skin tension lines where possible.    The area thus outlined was incised deep to adipose tissue with a #15 scalpel blade.  The skin margins were undermined to an appropriate distance in all directions utilizing iris scissors.

## 2020-11-03 PROBLEM — I10 HYPERTENSION: Status: RESOLVED | Noted: 2020-11-03 | Resolved: 2020-11-03

## 2020-11-03 PROBLEM — I25.10 CAD (CORONARY ARTERY DISEASE): Status: RESOLVED | Noted: 2020-11-03 | Resolved: 2020-11-03

## 2020-12-23 NOTE — PROGRESS NOTES
56   Temp 97.6 °F (36.4 °C) (Oral)   Resp 16   Ht 5' 8\" (1.727 m)   Wt 179 lb 2 oz (81.3 kg)   SpO2 96%   BMI 27.24 kg/m²     Physical Examination: General appearance - sleeping from egd  Mental status -See above  Neck - supple, no significant adenopathy, no JVD, or carotid bruits  Chest - clear to auscultation, no wheezes, rales or rhonchi, symmetric air entry  Heart - normal rate, regular rhythm, normal S1, S2, no murmurs, rubs, clicks or gallops  Abdomen - soft, nontender, nondistended, no masses or organomegaly  Neurological - See above    Musculoskeletal - pain when rotates head; no pain to palpation back  Extremities - no pedal edema noted  Skin - normal coloration and turgor, no rashes, no suspicious skin lesions noted    Labs:   Recent Labs     06/02/19  1256   WBC 7.5   HGB 13.1*   HCT 39.2*        Recent Labs     06/02/19  1256      K 4.4      CO2 24   BUN 18   CREATININE 1.1   CALCIUM 8.8     No results for input(s): AST, ALT, BILIDIR, BILITOT, ALKPHOS in the last 72 hours. Recent Labs     06/03/19  1551   INR 0.99     No results for input(s): Melo Aver in the last 72 hours. Microbiology:      Urinalysis:      Lab Results   Component Value Date    NITRU NEGATIVE 11/22/2011    WBCUA 15-25 11/22/2011    BACTERIA NONE 11/22/2011    RBCUA 0-2 11/22/2011    BLOODU NEGATIVE 11/22/2011    GLUCOSEU NEGATIVE 11/22/2011       Radiology:  Ct Cervical Spine Wo Contrast    Result Date: 6/2/2019  PROCEDURE: CT CERVICAL SPINE WO CONTRAST CLINICAL INFORMATION: To rule out osteo abnormalities. Hypertension. Cervical fusion. COMPARISON: MR cervical spine 5/30/2019. MR cervical spine 5/28/2019. TECHNIQUE: 3 mm noncontrast axial images were obtained through the cervical spine with sagittal and coronal reconstructions.  All CT scans at this facility use dose modulation, iterative reconstruction, and/or weight-based dosing when appropriate to reduce radiation dose to as low as reasonably achievable. FINDINGS: There is lucency in the disc space at the C6-7 level. There is some erosion of the endplates on the right greater than left. This is most noted in the anterior disc space. This corresponds to the area of edema on CT. This can are present osteomyelitis. The cortex of the superior endplate of C7 is indistinct. These findings can represent osteomyelitis. There is a solid fusion across the discs at the C4-C6 levels. There is 4 mm of anterolisthesis of C3 on C4. This is due to facet degenerative changes. The facet joints at this level are fused. There is some mild prevertebral soft tissue swelling anterior to the C6-7 level. There is some gas density seen in the esophagus. In the cervical soft tissues, vascular calcifications are noted. There are calcified pleural plaques bilaterally in the lung apices. There are no suspicious findings in the lung apices. 1. Endplate erosions and mild widening of the anterior disc space at the C6-7 level. This in conjunction with the findings on MRI can represent discitis/osteomyelitis. There is mild thickening of the prevertebral soft tissues at this level. **This report has been created using voice recognition software. It may contain minor errors which are inherent in voice recognition technology. ** Final report electronically signed by Dr. Marian Ferrari on 6/2/2019 8:29 AM    Mri Cervical Spine W Wo Contrast    Result Date: 5/31/2019  PROCEDURE: MRI CERVICAL SPINE W WO CONTRAST CLINICAL INFORMATION: NECK PAIN, CERVICAL SPINE, mri on 5/28 showing possible cervical spine OM/discitis. COMPARISON: None available. Correlation is made to MRI of the cervical spine dated May 28, 2019 and performed at [facility]. TECHNIQUE: Sagittal T1, T2 and STIR sequences were obtained through the cervical spine. Axial fast and echo and gradient echo T2-weighted images were obtained. Postcontrast axial and sagittal T1-weighted images were obtained.  Postcontrast images were obtained after administration of 15 mL ProHance intravenous contrast. FINDINGS: The cervical spine is imaged from the craniocervical junction to the T3 level. No suspicious finding is identified at the cervical medullary junction. No abnormal signal or expansion is present within the cervical spinal cord. No abnormal enhancement is seen within the cord parenchyma. There is a subtle, peripherally enhancing collection within the epidural space posterior to the C6-T1 levels. The collection measures approximately 0.3 x 0.7 x 3.5 cm. There is straightening of the expected cervical lordosis. Anterolisthesis is noted of C3 on C4 which measures approximately 0.2 cm. There is also minimal anterolisthesis of C7 on T1. Bony fusion is noted across the C4-5 and C5-6 disc spaces. There is subtle hyperintense T2 signal and enhancement within the C6-7 disc space. Marrow edema and enhancement is also noted within the adjacent C6 and C7 marrow spaces. With regards to the disc spaces, at C2-C3, the spinal canal is patent. Uncovertebral joint spurring and facet arthropathy results in mild neural foraminal narrowing on the left without significant neural foraminal narrowing on the right. At C3-C4, there is uncovering of the disc. The spinal canal is patent. Uncovertebral joint spurring and facet arthropathy causes mild to moderate neural foraminal narrowing bilaterally. At C4-C5, the spinal canal is patent. Uncovertebral joint spurring and facet arthropathy causes mild to moderate neural foraminal narrowing on the right and mild neural foraminal narrowing on the left. At C5-C6, the spinal canal is patent. Uncovertebral joint spurring is present causing mild bilateral neural foraminal narrowing. At C6-C7, there is a disc osteophyte complex. This results in mild spinal canal narrowing. Uncovertebral joint spurring is present causing mild bilateral neural foraminal narrowing. At C7-T1, there is a disc osteophyte complex.  The spinal canal What Type Of Note Output Would You Prefer (Optional)?: Standard Output Is The Patient Presenting As Previously Scheduled?: Yes How Severe Is Your Rash?: mild Is This A New Presentation, Or A Follow-Up?: Rash

## 2024-04-23 ENCOUNTER — TELEPHONE (OUTPATIENT)
Dept: ENT CLINIC | Age: 89
End: 2024-04-23

## 2024-04-23 ENCOUNTER — OFFICE VISIT (OUTPATIENT)
Dept: ENT CLINIC | Age: 89
End: 2024-04-23
Payer: MEDICARE

## 2024-04-23 VITALS
HEART RATE: 67 BPM | RESPIRATION RATE: 16 BRPM | SYSTOLIC BLOOD PRESSURE: 120 MMHG | WEIGHT: 153.1 LBS | OXYGEN SATURATION: 98 % | DIASTOLIC BLOOD PRESSURE: 74 MMHG | HEIGHT: 68 IN | TEMPERATURE: 98.2 F | BODY MASS INDEX: 23.2 KG/M2

## 2024-04-23 DIAGNOSIS — J38.02 BILATERAL VOCAL CORD PARESIS: ICD-10-CM

## 2024-04-23 DIAGNOSIS — J38.7 CRICOARYTENOID JOINT FIXATION: ICD-10-CM

## 2024-04-23 DIAGNOSIS — Z01.818 PRE-OP TESTING: Primary | ICD-10-CM

## 2024-04-23 DIAGNOSIS — J38.6 GLOTTIC STENOSIS: ICD-10-CM

## 2024-04-23 DIAGNOSIS — J38.6 GLOTTIC STENOSIS: Primary | ICD-10-CM

## 2024-04-23 DIAGNOSIS — J38.7 PRESBYLARYNGES: ICD-10-CM

## 2024-04-23 PROCEDURE — G8427 DOCREV CUR MEDS BY ELIG CLIN: HCPCS | Performed by: OTOLARYNGOLOGY

## 2024-04-23 PROCEDURE — 1036F TOBACCO NON-USER: CPT | Performed by: OTOLARYNGOLOGY

## 2024-04-23 PROCEDURE — G8420 CALC BMI NORM PARAMETERS: HCPCS | Performed by: OTOLARYNGOLOGY

## 2024-04-23 PROCEDURE — 1123F ACP DISCUSS/DSCN MKR DOCD: CPT | Performed by: OTOLARYNGOLOGY

## 2024-04-23 PROCEDURE — 31575 DIAGNOSTIC LARYNGOSCOPY: CPT | Performed by: OTOLARYNGOLOGY

## 2024-04-23 PROCEDURE — 99205 OFFICE O/P NEW HI 60 MIN: CPT | Performed by: OTOLARYNGOLOGY

## 2024-04-23 NOTE — TELEPHONE ENCOUNTER
Dr Duenas would like Franki to hold his Plavix for 2 weeks prior to surgery and his cardiologist to decide if he does a bridge of Lovenox.    A request for clearance and medication instructions was faxed to Dr INGRIS Bhatt for both requests.

## 2024-04-23 NOTE — PROGRESS NOTES
structure laterally opening the posterior commissure up by 3 to 4 mm.  I can give him that much of an airway, he is likely to have both voice and the ability to breathe comfortably.  His voice may be diminished and he may need other measures or he may need the amount of lateralization expanded for better breathing.  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  In some patients this lasts over 2 years.      Regarding his potential for needing a revision operation, it would be far better for me to have to do a second procedure to improve him further then to overshoot the saran and give him an incompetent larynx with a breathy voice.  He understood that as did his family who was with us the entire time.  Additional risks include his medical problems for which she is on the Plavix and aspirin.  He can stay on the 81 mg aspirin but has to come off his Plavix for 2 weeks preoperatively.  He can go on Lovenox if necessary and I can put him on Lovenox postoperatively before starting him back on the Plavix since the effects of Plavix are so long-lasting.  Bleeding in the context of delicate laryngeal surgery of this nature could be airway compromising.      The patient reported understanding the basis of my recommendations and being willing to proceed as such.  He will go to his cardiologist and get clearance for this operation.  I spent over 60 minutes of face-to-face time with the patient not counting the 10 minutes is laryngoscopy required, the majority of which was dedicated to reviewing his complex history and structuring this follow-up care plan.      Return in about 2 months (around 6/23/2024) for For postoperative evaluation and laryngoscopy if indicated.           **This report has been created using voice recognition software. It may contain minor errors which are inherent in  Thalidomide Pregnancy And Lactation Text: This medication is Pregnancy Category X and is absolutely contraindicated during pregnancy. It is unknown if it is excreted in breast milk.

## 2024-07-10 RX ORDER — CARVEDILOL 25 MG/1
25 TABLET ORAL 2 TIMES DAILY WITH MEALS
COMMUNITY

## 2024-07-10 RX ORDER — ALBUTEROL SULFATE 1.25 MG/3ML
1 SOLUTION RESPIRATORY (INHALATION) EVERY 6 HOURS PRN
COMMUNITY

## 2024-07-10 NOTE — PROGRESS NOTES
Follow all instructions given by your physician  Do not eat or drink anything after midnight prior to surgery(includes water, chewing gum, mints and ice chips)  Sips of water am of surgery with allowed medications  May brush teeth   Do not smoke or chew tobacco, drink alcoholic beverages or use any illicit drugs for 24 hours prior to surgery  Bring insurance info and photo ID  Bring pertinent paperwork with you from Doctor or surgeons's office  Wear clean comfortable, loose-fitting clothing  No make-up, nail polish, jewelry, piercings, or contact lenses to be worn day of surgery  No glue on dentures morning of surgery; you will be asked to remove them for surgery. Case for glasses.  Shower the night before and the morning of surgery with cleansing soap provided or a liquid antibacterial soap, dry with new fresh clean towel after each shower, no lotions, creams or powder.  Clean sheets and pillowcase on bed night before surgery  Bring medications in original bottles, Bring rescue inhalers with you  Bring CPAP/BIPAP machine if you have one ( you may be charged if one is needed in recovery room )    Do you have a DNR? yes  Please Bring Healthcare Directive or Healthcare Power of  in so we can scan it into your chart.    Our pharmacy has a Meds to Beds program where they will deliver any new prescriptions you may have to your room before you leave. Our Pharmacy will clear it through your insurance; for example (same co pay). This enables you to take your new RX as soon as you need when you get home and avoids stop/wait delays on the way home.  Please have a form of payment with you and have someone designated as your Pharmacy contact with their phone # as you may not feel well or still be under the influence of anesthesia.    Please refer to the SSI-Surgical Site Infection Flyer you hopefully received in the mail-together we can prevent infections; signs and symptoms reviewed.  When discharged be sure you

## 2024-07-10 NOTE — PROGRESS NOTES
PAT call attempted, patient unavailable, left message to please call us back at your earliest convenience; [phone]

## 2024-07-10 NOTE — PROGRESS NOTES
PAT Call Date: 7/10   Surgery Date: 7/17    Surgeon: Henrique   Surgery: laryngoscopy+    Any Isolation Precautions? No      Type of Isolation Precaution: Not Applicable   Is patient from a nursing home? No  Name of Nursing Home:   Any equipment assist needed for moving patient? No   Type of Equipment: Not Applicable  Patient last weight: 153 lb     Hard Copy on Chart  In EPIC Pending/Notes   Consent -   Within 30 days; signed, dated & timed by patient and physician     [] On Arrival     [] Blood      [] DNR   H&P -   Within 30 days    [] Physician To Do     Clearance -        6/17 4/25  []Medical     [x]Cardiac Nova-cleared ok'd holding thinners     [x] Pulmonary Steinecker   Orders -   Signed and Dated      [] Physician To Do    Labs -   Within 3 months   ORDERED  [x] CBC    [x] CMP   [] GFR   [] INR    [] PTT    [] Urine    [] Liver Enzymes    [] MRSA Nasal      Others:     Radiology Studies -   Within 1 year  12/24  [x] P.Chest X-Ray   [] MRI    [] CT    [] Vascular   [] US     Pulmonary -     [] MAIRA   [] CPAP     Cardiac Workup -   Stress Test, Echo, Cath within 18 months  6/26 7/24/23  [x] EKG  -improved    [] Cath                 [] Stress Test                      [x] Echo/BRAD 55%   [] CABG   [] Holter Monitor      [] Pacemaker/ICD        Brand:        Where does patient have checked:         Last check:         Rep Notified:

## 2024-07-11 ENCOUNTER — PREP FOR PROCEDURE (OUTPATIENT)
Dept: ENT CLINIC | Age: 89
End: 2024-07-11

## 2024-07-17 ENCOUNTER — APPOINTMENT (OUTPATIENT)
Dept: GENERAL RADIOLOGY | Age: 89
End: 2024-07-17
Attending: OTOLARYNGOLOGY
Payer: MEDICARE

## 2024-07-17 ENCOUNTER — HOSPITAL ENCOUNTER (INPATIENT)
Age: 89
LOS: 3 days | Discharge: HOME | End: 2024-07-20
Attending: OTOLARYNGOLOGY | Admitting: OTOLARYNGOLOGY
Payer: MEDICARE

## 2024-07-17 ENCOUNTER — ANESTHESIA (OUTPATIENT)
Dept: OPERATING ROOM | Age: 89
End: 2024-07-17
Payer: MEDICARE

## 2024-07-17 ENCOUNTER — ANESTHESIA EVENT (OUTPATIENT)
Dept: OPERATING ROOM | Age: 89
End: 2024-07-17
Payer: MEDICARE

## 2024-07-17 DIAGNOSIS — J38.7 CRICOARYTENOID JOINT FIXATION: ICD-10-CM

## 2024-07-17 DIAGNOSIS — J38.02 BILATERAL VOCAL CORD PARESIS: ICD-10-CM

## 2024-07-17 DIAGNOSIS — J38.6 GLOTTIC STENOSIS: ICD-10-CM

## 2024-07-17 PROBLEM — Z98.890 POST-OPERATIVE STATE: Status: ACTIVE | Noted: 2024-07-17

## 2024-07-17 PROBLEM — J98.8 AIRWAY OBSTRUCTION: Status: ACTIVE | Noted: 2024-07-17

## 2024-07-17 LAB
ARTERIAL PATENCY WRIST A: POSITIVE
BASE EXCESS BLDA CALC-SCNC: -1.7 MMOL/L (ref -2.5–2.5)
BASE EXCESS BLDA CALC-SCNC: -1.8 MMOL/L (ref -2.5–2.5)
BDY SITE: ABNORMAL
BDY SITE: ABNORMAL
COLLECTED BY:: ABNORMAL
COLLECTED BY:: ABNORMAL
DEVICE: ABNORMAL
DEVICE: ABNORMAL
FIO2 ON VENT O2 ANALYZER: 30 %
HCO3 BLDA-SCNC: 25 MMOL/L (ref 23–28)
HCO3 BLDA-SCNC: 28 MMOL/L (ref 23–28)
PCO2 BLDA: 50 MMHG (ref 35–45)
PCO2 BLDA: 76 MMHG (ref 35–45)
PEEP SETTING VENT: 8 MMHG
PH BLDA: 7.18 [PH] (ref 7.35–7.45)
PH BLDA: 7.31 [PH] (ref 7.35–7.45)
PIP: 16 CMH2O
PO2 BLDA: 117 MMHG (ref 71–104)
PO2 BLDA: 198 MMHG (ref 71–104)
SAO2 % BLDA: 98 %
SAO2 % BLDA: 99 %
VENTILATION MODE VENT: ABNORMAL
VENTILATION MODE VENT: ABNORMAL

## 2024-07-17 PROCEDURE — 87641 MR-STAPH DNA AMP PROBE: CPT

## 2024-07-17 PROCEDURE — 94761 N-INVAS EAR/PLS OXIMETRY MLT: CPT

## 2024-07-17 PROCEDURE — 31552 LARYNGOPLASTY LARYNGEAL STEN: CPT | Performed by: OTOLARYNGOLOGY

## 2024-07-17 PROCEDURE — 6360000002 HC RX W HCPCS: Performed by: OTOLARYNGOLOGY

## 2024-07-17 PROCEDURE — 31561 LARYNSCOP REMVE CART + SCOP: CPT | Performed by: OTOLARYNGOLOGY

## 2024-07-17 PROCEDURE — 5A0945A ASSISTANCE WITH RESPIRATORY VENTILATION, 24-96 CONSECUTIVE HOURS, HIGH NASAL FLOW/VELOCITY: ICD-10-PCS | Performed by: INTERNAL MEDICINE

## 2024-07-17 PROCEDURE — 6370000000 HC RX 637 (ALT 250 FOR IP): Performed by: OTOLARYNGOLOGY

## 2024-07-17 PROCEDURE — 71045 X-RAY EXAM CHEST 1 VIEW: CPT

## 2024-07-17 PROCEDURE — 6370000000 HC RX 637 (ALT 250 FOR IP)

## 2024-07-17 PROCEDURE — 6360000002 HC RX W HCPCS: Performed by: NURSE ANESTHETIST, CERTIFIED REGISTERED

## 2024-07-17 PROCEDURE — 6360000002 HC RX W HCPCS

## 2024-07-17 PROCEDURE — 2100000000 HC CCU R&B

## 2024-07-17 PROCEDURE — 87086 URINE CULTURE/COLONY COUNT: CPT

## 2024-07-17 PROCEDURE — 36600 WITHDRAWAL OF ARTERIAL BLOOD: CPT

## 2024-07-17 PROCEDURE — 2500000003 HC RX 250 WO HCPCS: Performed by: NURSE ANESTHETIST, CERTIFIED REGISTERED

## 2024-07-17 PROCEDURE — 2709999900 HC NON-CHARGEABLE SUPPLY: Performed by: OTOLARYNGOLOGY

## 2024-07-17 PROCEDURE — 7100000000 HC PACU RECOVERY - FIRST 15 MIN: Performed by: OTOLARYNGOLOGY

## 2024-07-17 PROCEDURE — 3700000000 HC ANESTHESIA ATTENDED CARE: Performed by: OTOLARYNGOLOGY

## 2024-07-17 PROCEDURE — 0CBS8ZZ EXCISION OF LARYNX, VIA NATURAL OR ARTIFICIAL OPENING ENDOSCOPIC: ICD-10-PCS | Performed by: INTERNAL MEDICINE

## 2024-07-17 PROCEDURE — 82803 BLOOD GASES ANY COMBINATION: CPT

## 2024-07-17 PROCEDURE — 3600000004 HC SURGERY LEVEL 4 BASE: Performed by: OTOLARYNGOLOGY

## 2024-07-17 PROCEDURE — 7100000001 HC PACU RECOVERY - ADDTL 15 MIN: Performed by: OTOLARYNGOLOGY

## 2024-07-17 PROCEDURE — 94660 CPAP INITIATION&MGMT: CPT

## 2024-07-17 PROCEDURE — 2580000003 HC RX 258: Performed by: OTOLARYNGOLOGY

## 2024-07-17 PROCEDURE — 3700000001 HC ADD 15 MINUTES (ANESTHESIA): Performed by: OTOLARYNGOLOGY

## 2024-07-17 PROCEDURE — 0CQS8ZZ REPAIR LARYNX, VIA NATURAL OR ARTIFICIAL OPENING ENDOSCOPIC: ICD-10-PCS | Performed by: INTERNAL MEDICINE

## 2024-07-17 PROCEDURE — 88305 TISSUE EXAM BY PATHOLOGIST: CPT

## 2024-07-17 PROCEDURE — 3600000014 HC SURGERY LEVEL 4 ADDTL 15MIN: Performed by: OTOLARYNGOLOGY

## 2024-07-17 PROCEDURE — 2700000000 HC OXYGEN THERAPY PER DAY

## 2024-07-17 PROCEDURE — 51798 US URINE CAPACITY MEASURE: CPT

## 2024-07-17 PROCEDURE — 87077 CULTURE AEROBIC IDENTIFY: CPT

## 2024-07-17 PROCEDURE — 94640 AIRWAY INHALATION TREATMENT: CPT

## 2024-07-17 RX ORDER — FENTANYL CITRATE 50 UG/ML
INJECTION, SOLUTION INTRAMUSCULAR; INTRAVENOUS PRN
Status: DISCONTINUED | OUTPATIENT
Start: 2024-07-17 | End: 2024-07-17 | Stop reason: SDUPTHER

## 2024-07-17 RX ORDER — CARVEDILOL 25 MG/1
25 TABLET ORAL 2 TIMES DAILY WITH MEALS
Status: DISCONTINUED | OUTPATIENT
Start: 2024-07-17 | End: 2024-07-20 | Stop reason: HOSPADM

## 2024-07-17 RX ORDER — SODIUM CHLORIDE 0.9 % (FLUSH) 0.9 %
5-40 SYRINGE (ML) INJECTION EVERY 12 HOURS SCHEDULED
Status: CANCELLED | OUTPATIENT
Start: 2024-07-17

## 2024-07-17 RX ORDER — SODIUM CHLORIDE 0.9 % (FLUSH) 0.9 %
5-40 SYRINGE (ML) INJECTION PRN
Status: DISCONTINUED | OUTPATIENT
Start: 2024-07-17 | End: 2024-07-17 | Stop reason: HOSPADM

## 2024-07-17 RX ORDER — LEVOTHYROXINE SODIUM 0.12 MG/1
125 TABLET ORAL DAILY
Status: DISCONTINUED | OUTPATIENT
Start: 2024-07-18 | End: 2024-07-20 | Stop reason: HOSPADM

## 2024-07-17 RX ORDER — ASPIRIN 81 MG/1
81 TABLET ORAL DAILY
Status: DISCONTINUED | OUTPATIENT
Start: 2024-07-17 | End: 2024-07-20 | Stop reason: HOSPADM

## 2024-07-17 RX ORDER — ACETAMINOPHEN 325 MG/1
650 TABLET ORAL EVERY 4 HOURS PRN
Status: DISCONTINUED | OUTPATIENT
Start: 2024-07-17 | End: 2024-07-20 | Stop reason: HOSPADM

## 2024-07-17 RX ORDER — HYDRALAZINE HYDROCHLORIDE 20 MG/ML
INJECTION INTRAMUSCULAR; INTRAVENOUS
Status: COMPLETED
Start: 2024-07-17 | End: 2024-07-17

## 2024-07-17 RX ORDER — SODIUM CHLORIDE 0.9 % (FLUSH) 0.9 %
5-40 SYRINGE (ML) INJECTION PRN
Status: CANCELLED | OUTPATIENT
Start: 2024-07-17

## 2024-07-17 RX ORDER — HYDRALAZINE HYDROCHLORIDE 20 MG/ML
10 INJECTION INTRAMUSCULAR; INTRAVENOUS ONCE
Status: COMPLETED | OUTPATIENT
Start: 2024-07-17 | End: 2024-07-17

## 2024-07-17 RX ORDER — OXYCODONE HYDROCHLORIDE 5 MG/1
5 TABLET ORAL EVERY 4 HOURS PRN
Status: DISCONTINUED | OUTPATIENT
Start: 2024-07-17 | End: 2024-07-20 | Stop reason: HOSPADM

## 2024-07-17 RX ORDER — IPRATROPIUM BROMIDE AND ALBUTEROL SULFATE 2.5; .5 MG/3ML; MG/3ML
1 SOLUTION RESPIRATORY (INHALATION) EVERY 4 HOURS PRN
Status: CANCELLED | OUTPATIENT
Start: 2024-07-17

## 2024-07-17 RX ORDER — EPINEPHRINE 1 MG/ML
INJECTION, SOLUTION, CONCENTRATE INTRAVENOUS PRN
Status: DISCONTINUED | OUTPATIENT
Start: 2024-07-17 | End: 2024-07-17 | Stop reason: ALTCHOICE

## 2024-07-17 RX ORDER — FLUTICASONE PROPIONATE 50 MCG
1 SPRAY, SUSPENSION (ML) NASAL DAILY
Status: DISCONTINUED | OUTPATIENT
Start: 2024-07-17 | End: 2024-07-20 | Stop reason: HOSPADM

## 2024-07-17 RX ORDER — ATORVASTATIN CALCIUM 40 MG/1
40 TABLET, FILM COATED ORAL NIGHTLY
Status: DISCONTINUED | OUTPATIENT
Start: 2024-07-17 | End: 2024-07-20 | Stop reason: HOSPADM

## 2024-07-17 RX ORDER — SODIUM CHLORIDE 0.9 % (FLUSH) 0.9 %
5-40 SYRINGE (ML) INJECTION EVERY 12 HOURS SCHEDULED
Status: DISCONTINUED | OUTPATIENT
Start: 2024-07-17 | End: 2024-07-17 | Stop reason: HOSPADM

## 2024-07-17 RX ORDER — SODIUM CHLORIDE FOR INHALATION 0.9 %
3 VIAL, NEBULIZER (ML) INHALATION ONCE
Status: COMPLETED | OUTPATIENT
Start: 2024-07-17 | End: 2024-07-17

## 2024-07-17 RX ORDER — DEXAMETHASONE SODIUM PHOSPHATE 10 MG/ML
10 INJECTION, EMULSION INTRAMUSCULAR; INTRAVENOUS
Status: COMPLETED | OUTPATIENT
Start: 2024-07-17 | End: 2024-07-17

## 2024-07-17 RX ORDER — ROCURONIUM BROMIDE 10 MG/ML
INJECTION, SOLUTION INTRAVENOUS PRN
Status: DISCONTINUED | OUTPATIENT
Start: 2024-07-17 | End: 2024-07-17 | Stop reason: SDUPTHER

## 2024-07-17 RX ORDER — ALBUTEROL SULFATE 2.5 MG/3ML
1.25 SOLUTION RESPIRATORY (INHALATION) EVERY 6 HOURS PRN
Status: DISCONTINUED | OUTPATIENT
Start: 2024-07-17 | End: 2024-07-18

## 2024-07-17 RX ORDER — PROPOFOL 10 MG/ML
INJECTION, EMULSION INTRAVENOUS PRN
Status: DISCONTINUED | OUTPATIENT
Start: 2024-07-17 | End: 2024-07-17 | Stop reason: SDUPTHER

## 2024-07-17 RX ORDER — PHENYLEPHRINE HCL IN 0.9% NACL 1 MG/10 ML
SYRINGE (ML) INTRAVENOUS PRN
Status: DISCONTINUED | OUTPATIENT
Start: 2024-07-17 | End: 2024-07-17 | Stop reason: SDUPTHER

## 2024-07-17 RX ORDER — PANTOPRAZOLE SODIUM 40 MG/1
40 TABLET, DELAYED RELEASE ORAL
Status: DISCONTINUED | OUTPATIENT
Start: 2024-07-18 | End: 2024-07-20 | Stop reason: HOSPADM

## 2024-07-17 RX ORDER — ISOSORBIDE MONONITRATE 60 MG/1
60 TABLET, EXTENDED RELEASE ORAL DAILY
Status: DISCONTINUED | OUTPATIENT
Start: 2024-07-18 | End: 2024-07-20 | Stop reason: HOSPADM

## 2024-07-17 RX ORDER — ONDANSETRON 2 MG/ML
INJECTION INTRAMUSCULAR; INTRAVENOUS PRN
Status: DISCONTINUED | OUTPATIENT
Start: 2024-07-17 | End: 2024-07-17 | Stop reason: SDUPTHER

## 2024-07-17 RX ORDER — CLOPIDOGREL BISULFATE 75 MG/1
75 TABLET ORAL DAILY
Status: DISCONTINUED | OUTPATIENT
Start: 2024-07-17 | End: 2024-07-20 | Stop reason: HOSPADM

## 2024-07-17 RX ORDER — EPHEDRINE SULFATE 50 MG/ML
INJECTION INTRAVENOUS PRN
Status: DISCONTINUED | OUTPATIENT
Start: 2024-07-17 | End: 2024-07-17 | Stop reason: SDUPTHER

## 2024-07-17 RX ORDER — SODIUM CHLORIDE FOR INHALATION 0.9 %
VIAL, NEBULIZER (ML) INHALATION
Status: COMPLETED
Start: 2024-07-17 | End: 2024-07-17

## 2024-07-17 RX ORDER — DOCUSATE SODIUM 100 MG/1
100 CAPSULE, LIQUID FILLED ORAL 2 TIMES DAILY PRN
Status: DISCONTINUED | OUTPATIENT
Start: 2024-07-17 | End: 2024-07-20 | Stop reason: HOSPADM

## 2024-07-17 RX ORDER — LIDOCAINE HCL/PF 100 MG/5ML
SYRINGE (ML) INJECTION PRN
Status: DISCONTINUED | OUTPATIENT
Start: 2024-07-17 | End: 2024-07-17 | Stop reason: SDUPTHER

## 2024-07-17 RX ORDER — PROPOFOL 10 MG/ML
INJECTION, EMULSION INTRAVENOUS CONTINUOUS PRN
Status: DISCONTINUED | OUTPATIENT
Start: 2024-07-17 | End: 2024-07-17 | Stop reason: SDUPTHER

## 2024-07-17 RX ORDER — IPRATROPIUM BROMIDE AND ALBUTEROL SULFATE 2.5; .5 MG/3ML; MG/3ML
1 SOLUTION RESPIRATORY (INHALATION) EVERY 4 HOURS PRN
Status: DISCONTINUED | OUTPATIENT
Start: 2024-07-17 | End: 2024-07-17 | Stop reason: HOSPADM

## 2024-07-17 RX ORDER — MORPHINE SULFATE 2 MG/ML
INJECTION, SOLUTION INTRAMUSCULAR; INTRAVENOUS
Status: COMPLETED
Start: 2024-07-17 | End: 2024-07-17

## 2024-07-17 RX ORDER — RANOLAZINE 500 MG/1
500 TABLET, EXTENDED RELEASE ORAL 2 TIMES DAILY
Status: DISCONTINUED | OUTPATIENT
Start: 2024-07-17 | End: 2024-07-20 | Stop reason: HOSPADM

## 2024-07-17 RX ORDER — SODIUM CHLORIDE 9 MG/ML
INJECTION, SOLUTION INTRAVENOUS PRN
Status: CANCELLED | OUTPATIENT
Start: 2024-07-17

## 2024-07-17 RX ORDER — MORPHINE SULFATE 2 MG/ML
1 INJECTION, SOLUTION INTRAMUSCULAR; INTRAVENOUS
Status: DISCONTINUED | OUTPATIENT
Start: 2024-07-17 | End: 2024-07-20 | Stop reason: HOSPADM

## 2024-07-17 RX ORDER — MORPHINE SULFATE 2 MG/ML
2 INJECTION, SOLUTION INTRAMUSCULAR; INTRAVENOUS
Status: DISCONTINUED | OUTPATIENT
Start: 2024-07-17 | End: 2024-07-20 | Stop reason: HOSPADM

## 2024-07-17 RX ORDER — SODIUM CHLORIDE 9 MG/ML
INJECTION, SOLUTION INTRAVENOUS PRN
Status: DISCONTINUED | OUTPATIENT
Start: 2024-07-17 | End: 2024-07-17 | Stop reason: HOSPADM

## 2024-07-17 RX ADMIN — ISODIUM CHLORIDE 3 ML: 0.03 SOLUTION RESPIRATORY (INHALATION) at 16:28

## 2024-07-17 RX ADMIN — PHENYLEPHRINE HYDROCHLORIDE 200 MCG: 10 INJECTION INTRAVENOUS at 14:15

## 2024-07-17 RX ADMIN — HYDRALAZINE HYDROCHLORIDE 10 MG: 20 INJECTION INTRAMUSCULAR; INTRAVENOUS at 16:28

## 2024-07-17 RX ADMIN — SUGAMMADEX 100 MG: 100 INJECTION, SOLUTION INTRAVENOUS at 13:52

## 2024-07-17 RX ADMIN — MORPHINE SULFATE 2 MG: 2 INJECTION, SOLUTION INTRAMUSCULAR; INTRAVENOUS at 22:01

## 2024-07-17 RX ADMIN — EPHEDRINE SULFATE 10 MG: 50 INJECTION INTRAVENOUS at 13:35

## 2024-07-17 RX ADMIN — MORPHINE SULFATE 2 MG: 2 INJECTION, SOLUTION INTRAMUSCULAR; INTRAVENOUS at 19:49

## 2024-07-17 RX ADMIN — FENTANYL CITRATE 50 MCG: 50 INJECTION, SOLUTION INTRAMUSCULAR; INTRAVENOUS at 13:26

## 2024-07-17 RX ADMIN — SODIUM CHLORIDE 3000 MG: 900 INJECTION INTRAVENOUS at 12:52

## 2024-07-17 RX ADMIN — OXYCODONE 5 MG: 5 TABLET ORAL at 23:58

## 2024-07-17 RX ADMIN — EPHEDRINE SULFATE 10 MG: 50 INJECTION INTRAVENOUS at 14:18

## 2024-07-17 RX ADMIN — ONDANSETRON 4 MG: 2 INJECTION INTRAMUSCULAR; INTRAVENOUS at 15:34

## 2024-07-17 RX ADMIN — PROPOFOL 50 MG: 10 INJECTION, EMULSION INTRAVENOUS at 14:03

## 2024-07-17 RX ADMIN — ROCURONIUM BROMIDE 50 MG: 10 INJECTION INTRAVENOUS at 13:38

## 2024-07-17 RX ADMIN — PHENYLEPHRINE HYDROCHLORIDE 200 MCG: 10 INJECTION INTRAVENOUS at 14:21

## 2024-07-17 RX ADMIN — FENTANYL CITRATE 100 MCG: 50 INJECTION, SOLUTION INTRAMUSCULAR; INTRAVENOUS at 14:03

## 2024-07-17 RX ADMIN — RACEPINEPHRINE HYDROCHLORIDE 0.5 ML: 11.25 SOLUTION RESPIRATORY (INHALATION) at 16:27

## 2024-07-17 RX ADMIN — PROPOFOL 120 MG: 10 INJECTION, EMULSION INTRAVENOUS at 13:19

## 2024-07-17 RX ADMIN — ROCURONIUM BROMIDE 25 MG: 10 INJECTION INTRAVENOUS at 14:19

## 2024-07-17 RX ADMIN — FENTANYL CITRATE 50 MCG: 50 INJECTION, SOLUTION INTRAMUSCULAR; INTRAVENOUS at 13:45

## 2024-07-17 RX ADMIN — Medication 3 ML: at 16:02

## 2024-07-17 RX ADMIN — EPHEDRINE SULFATE 5 MG: 50 INJECTION INTRAVENOUS at 14:25

## 2024-07-17 RX ADMIN — RANOLAZINE 500 MG: 500 TABLET, EXTENDED RELEASE ORAL at 20:57

## 2024-07-17 RX ADMIN — Medication 200 MCG: at 15:33

## 2024-07-17 RX ADMIN — PROPOFOL 150 MCG/KG/MIN: 10 INJECTION, EMULSION INTRAVENOUS at 13:40

## 2024-07-17 RX ADMIN — SODIUM CHLORIDE: 9 INJECTION, SOLUTION INTRAVENOUS at 15:47

## 2024-07-17 RX ADMIN — ISODIUM CHLORIDE 3 ML: 0.03 SOLUTION RESPIRATORY (INHALATION) at 16:02

## 2024-07-17 RX ADMIN — CARVEDILOL 25 MG: 25 TABLET, FILM COATED ORAL at 20:57

## 2024-07-17 RX ADMIN — RACEPINEPHRINE HYDROCHLORIDE 0.5 ML: 11.25 SOLUTION RESPIRATORY (INHALATION) at 16:02

## 2024-07-17 RX ADMIN — Medication 100 MG: at 13:19

## 2024-07-17 RX ADMIN — DEXAMETHASONE SODIUM PHOSPHATE 10 MG: 10 INJECTION, EMULSION INTRAMUSCULAR; INTRAVENOUS at 12:51

## 2024-07-17 RX ADMIN — SODIUM CHLORIDE: 9 INJECTION, SOLUTION INTRAVENOUS at 11:58

## 2024-07-17 RX ADMIN — SUGAMMADEX 100 MG: 100 INJECTION, SOLUTION INTRAVENOUS at 13:46

## 2024-07-17 RX ADMIN — FLUTICASONE PROPIONATE 1 SPRAY: 50 SPRAY, METERED NASAL at 20:59

## 2024-07-17 RX ADMIN — ATORVASTATIN CALCIUM 40 MG: 40 TABLET, FILM COATED ORAL at 20:57

## 2024-07-17 RX ADMIN — SUGAMMADEX 200 MG: 100 INJECTION, SOLUTION INTRAVENOUS at 15:22

## 2024-07-17 RX ADMIN — PHENYLEPHRINE HYDROCHLORIDE 200 MCG: 10 INJECTION INTRAVENOUS at 13:32

## 2024-07-17 ASSESSMENT — PAIN SCALES - GENERAL
PAINLEVEL_OUTOF10: 7
PAINLEVEL_OUTOF10: 5
PAINLEVEL_OUTOF10: 0
PAINLEVEL_OUTOF10: 8

## 2024-07-17 ASSESSMENT — PAIN DESCRIPTION - LOCATION
LOCATION: NECK;THROAT

## 2024-07-17 ASSESSMENT — PAIN - FUNCTIONAL ASSESSMENT: PAIN_FUNCTIONAL_ASSESSMENT: NONE - DENIES PAIN

## 2024-07-17 NOTE — PROGRESS NOTES
Patient oriented to Same Day department and admitted to Same Day Surgery room 1.   Patient verbalized approval for first name, last initial with physician name on unit whiteboard.     Plan of care reviewed with patient.   Patient room whiteboard filled out and discussed with patient and responsible adult.   Patient and responsible adult offered Same Day Welcome Packet to review.    Call light in reach.   Bed in lowest position, locked, with one bed rail up.   SCDs and warming blanket in place.  Appropriate arm bands on patient.   Bathroom offered.   All questions and concerns of patient addressed.    Meds to Beds:   Patient informed of [facility] Meds to Beds program during admission. Patient is agreeable to program.   Contact information for the pharmacy and the Meds to Beds program:   Name: Sonia   Relationship to patient:spouse/significant other   Phone number: [phone]

## 2024-07-17 NOTE — OP NOTE
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]    Post-Op Diagnosis: Same except: cricoarytenoid joints mobile       Procedure(s):  Laryngoscopy with Arytenoidectomy, Laryngoplasty with Flap    Surgeon(s):  Daryl Duenas MD    Assistant:   * No surgical staff found *    Anesthesia: General    Estimated Blood Loss (mL): Minimal    Complications: None    Specimens:   ID Type Source Tests Collected by Time Destination   A : Right Arytenoid Body Tissue Larynx SURGICAL PATHOLOGY Daryl Duenas MD 7/17/2024 1439        Implants:  * No implants in log *      Drains: * No LDAs found *    Findings:  Infection Present At Time Of Surgery (PATOS) (choose all levels that have infection present):  No infection present  Other Findings: 1.  Tight glottis with inspiratory stridor of the head of case  2.  Both cricoarytenoid joints fully mobile under neuromuscular blockade  3.  Allowed larynx to recover movement and found left side to have stronger more active movement so opted to lateralize right vocal fold after central arytenoidectomy  4.  Postextubation patient had stridor and hypercarbia requiring BiPAP and racemic epinephrine; admitted to 4B ICU for close airway monitoring and potential for reintubation  5.  Patient is uncomplicated DL.  Recommend intubation with #6 cuffed MLT tube if necessary.  Airway Jelco catheter left in place in the event that Ambu bag ventilation through that catheter necessary to bridge saturations for intubation.    Detailed Description of Procedure:   The patient was taken to the operating awake and placed in supine position.  In the holding area the patient was noted to have inspiratory stridor which continued and worsened just prior to induction and intubation.  The patient was  jet ventilation was very officially delivered distally.    Bring onto the field and operating microscope with a CO2 UltraPulse laser and a digital Accu blade robotic joystick controller, I enabled line of sight care.  I had a panoramic view of the entire larynx.  Using a 1 mm Solomon with a 0.1-second delay I made a sigmoidal laryngotomy incision through the right supraglottis from anterior lateral to posterior medial over the arytenoid body.  I took this dissection into the substance of the AE fold and false fold as I exposed the arytenoid cartilage broadly in preparation for central arytenoidectomy.  I used blunt dissection and laser ablation to make a working aperture around the arytenoid cartilage itself.  I then used straight biting 1.5 mm cup forceps to remove the central arytenoid from the top of the arytenoid tower down to the cricoarytenoid joint capsule.  I handed this tissue off to be placed in formalin for permanent section.    Transoral laryngoplasty with arytenoid lateralization and advancement flap reconstruction: With the central arytenoid removed, I turned my attention to widening the patient's airway with a lateralization reconstitution of the arytenoid body.  After dissecting the arytenoid more broadly I made a sufficiently wide access to the cricoarytenoid joint so that I could pass a lateralization suture from within the dissection from proximal to distal and medial to lateral through the soft tissues overlying the thyroid lamina and then into the wound using a 4-0 Prolene on a P3 cutting needle.  Once in the wound I passed the needle medially into the airway and then picked it up within the substance of the true fold just posterior to the tip of the vocal process.  I passed the suture and to the wound again and repeated the maneuver more posteriorly so that I could grab additional substance of the arytenoid and superficial tissues to the thyroid lamina.  I passed the needle back into the wound

## 2024-07-17 NOTE — ANESTHESIA PRE PROCEDURE
Department of Anesthesiology  Preprocedure Note       Name:  Franki Thao   Age:  88 y.o.  :  1935                                          MRN:  [id number]         Date:  2024      Surgeon: Surgeon(s):  Daryl Duenas MD    Procedure: Procedure(s):  Laryngoscopy with Arytenoidectomy, Laryngoplasty with Flap    Medications prior to admission:   Prior to Admission medications    Medication Sig Start Date End Date Taking? Authorizing Provider   carvedilol (COREG) 25 MG tablet Take 1 tablet by mouth 2 times daily (with meals) 1/2 tab   Yes Nilam Woody MD   albuterol (ACCUNEB) 1.25 MG/3ML nebulizer solution Inhale 3 mLs into the lungs every 6 hours as needed for Wheezing   Yes Nilam Woody MD   ALBUTEROL SULFATE HFA IN Inhale 2 puffs into the lungs as needed   Yes Nilam Woody MD   Multiple Vitamins-Minerals (OCUVITE ADULT FORMULA PO) Take by mouth in the morning and at bedtime   Yes Nilam Woody MD   fluticasone (FLONASE) 50 MCG/ACT nasal spray 1 spray by Nasal route daily 19   Nilam Woody MD   docusate sodium (COLACE) 100 MG capsule Take 1 capsule by mouth 2 times daily as needed for Constipation 19   Jose Luis Delcid MD   levothyroxine (SYNTHROID) 125 MCG tablet Take 1 tablet by mouth Daily 1/2 tablet on empty stomach    Nilam Woody MD   isosorbide mononitrate (IMDUR) 60 MG extended release tablet Take 1 tablet by mouth daily    Nilam Woody MD   atorvastatin (LIPITOR) 40 MG tablet Take 1 tablet by mouth nightly    Nilam Woody MD   ranolazine (RANEXA) 500 MG extended release tablet Take 1 tablet by mouth 2 times daily    Nilam Woody MD   pantoprazole sodium (PROTONIX) 40 MG PACK packet Take 1 packet by mouth every morning (before breakfast)    Nilam Woody MD   clopidogrel (PLAVIX) 75 MG tablet Take 1 tablet by mouth daily    Nilam Woody MD   aspirin EC 81 MG EC tablet Take 1 tablet by

## 2024-07-17 NOTE — BRIEF OP NOTE
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]    Post-Op Diagnosis: Same       Procedure(s):  Laryngoscopy with Arytenoidectomy, Laryngoplasty with Flap    Surgeon(s):  Daryl Duenas MD    Assistant:  * No surgical staff found *    Anesthesia: General    Estimated Blood Loss (mL): Minimal    Complications: None    Specimens:   ID Type Source Tests Collected by Time Destination   A : Right Arytenoid Body Tissue Larynx SURGICAL PATHOLOGY Daryl Duenas MD 7/17/2024 1439        Implants:  * No implants in log *      Drains: * No LDAs found *    Findings:  Infection Present At Time Of Surgery (PATOS) (choose all levels that have infection present):  No infection present  Other Findings: 1.  Tight glottis with inspiratory stridor obstructive case  2.  Both cricoarytenoid joints fully mobile under neuromuscular blockade  3.  Allowed larynx to recover movement and found left side to have stronger more active movement so opted to lateralize right vocal fold after central arytenoidectomy  4.  Postextubation patient had stridor and hypercarbia requiring BiPAP and racemic epinephrine; admitted to 4B ICU for close airway monitoring and potential for reintubation  5.  Patient is uncomplicated DL.  Recommend intubation with #6 cuffed MLT tube if necessary.  Airway Jelco catheter left in place in the event that Ambu bag ventilation through that catheter necessary to bridge saturations for intubation.  Daryl Duenas MD  Cell: [phone]    Electronically signed by Daryl Duenas MD on 7/17/2024 at 5:22 PM

## 2024-07-17 NOTE — H&P
HISTORY AND PHYSICAL             Date: 7/17/2024        Patient Name: Franki Thao     YOB: 1935      Age:  88 y.o.    Chief Complaint   No chief complaint on file.     ”Dyspnea and stridor on exertion”    History Obtained From   patient    History of Present Illness   The patient is an 88-year-old male with a history of a recent surgical procedure that was complicated by failed extubation x 3 or 4 events all associated with having the breathing tube replaced and then removed finally successfully getting him extubated.  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  This gives the appearance of bilateral paralysis and airway encroachment that likely will be amenable to the published operation of \"transoral laryngoplasty with augmentation lateralization\".  This is the operation he comes for today.  He reports the level of dyspnea that he has to be about the same as it was when he was in my office and that his voice is about the same as it was that as well.  His family, who is with him, concur.    Past Medical History     Past Medical History:   Diagnosis Date    CAD (coronary artery disease)     Cancer (HCC)     basal cell    Hx of blood clots 1997    Hyperlipidemia     Hypertension     Personal history of DVT (deep vein thrombosis)     Pulmonary fibrosis (HCC)     Thyroid disease     Transverse myelitis (HCC) 1997        Past Surgical History     Past Surgical History:   Procedure Laterality Date    ANGIOPLASTY  1991, 1992, 1993, 1996, 2000, 2004    APPENDECTOMY  1938    BACK SURGERY  1995 x2, 1996, 1997, 2010    CARDIAC CATHETERIZATION  2001, 2006, 2009    CAROTID ARTERY SURGERY Left 2023    CERVICAL FUSION   tablet by mouth daily        aspirin EC 81 MG EC tablet Take 1 tablet by mouth daily        nitroGLYCERIN (NITROSTAT) 0.4 MG SL tablet Place 1 tablet under the tongue every 5 minutes as needed        acetaminophen (TYLENOL) 500 MG tablet Take 1 tablet by mouth every 6 hours as needed        trimethoprim (TRIMPEX) 100 MG tablet Take 100 mg by mouth daily        amlodipine (NORVASC) 10 MG tablet Take 5 mg by mouth 2 times daily.            No current facility-administered medications for this visit.         Past Medical History        Past Medical History:   Diagnosis Date    CAD (coronary artery disease)      Cancer (HCC)       basal cell    Hyperlipidemia      Hypertension      Personal history of DVT (deep vein thrombosis)      Transverse myelitis (HCC) 1997         Past Surgical History         Past Surgical History:   Procedure Laterality Date    ANGIOPLASTY   1991, 1992, 1993, 1996, 2000, 2004    APPENDECTOMY   1938    BACK SURGERY   1995 x2, 1996, 1997, 2010    CARDIAC CATHETERIZATION   2001, 2006, 2009    CERVICAL FUSION   [id number]    CERVICAL FUSION N/A 06/05/2019     ACDF C6-7 AND BIOPSY OF DISC C6-7 performed by Lea Macias MD at [facility] OR    CORONARY ANGIOPLASTY WITH STENT PLACEMENT   2000-2002     x3    FINGER AMPUTATION   1958     right hand    JOINT REPLACEMENT Right 2011    SHOULDER SURGERY        TONSILLECTOMY AND ADENOIDECTOMY   1942    UPPER GASTROINTESTINAL ENDOSCOPY Left 06/04/2019     EGD ESOPHAGOGASTRODUODENOSCOPY performed by Minerva Hermosillo MD at [facility] Endoscopy    UPPER GASTROINTESTINAL ENDOSCOPY Left 09/20/2019     EGD DILATION SAVORY performed by Jonathon Kern MD at [facility] Endoscopy    UPPER GASTROINTESTINAL ENDOSCOPY Left 09/20/2019     EGD BIOPSY performed by Jonathon Kern MD at [facility] Endoscopy         Family History         Family History   Problem Relation Age of Onset    Diabetes Mother      Stroke Mother      Heart Disease Father           Social History            Tobacco Use

## 2024-07-17 NOTE — ANESTHESIA POSTPROCEDURE EVALUATION
Department of Anesthesiology  Postprocedure Note    Patient: Franki Thao  MRN: [id number]  YOB: 1935  Date of evaluation: 7/17/2024    Procedure Summary       Date: 07/17/24 Room / Location: [facility] OR 06 / [facility] OR    Anesthesia Start: 1313 Anesthesia Stop: 1550    Procedure: Laryngoscopy with Arytenoidectomy, Laryngoplasty with Flap Diagnosis:       Glottic stenosis      Bilateral vocal cord paresis      Cricoarytenoid joint fixation      (Glottic stenosis [J38.6])      (Bilateral vocal cord paresis [J38.02])      (Cricoarytenoid joint fixation [J38.7])    Surgeons: Daryl Duenas MD Responsible Provider: Anselmo Blevins MD    Anesthesia Type: general ASA Status: 3            Anesthesia Type: No value filed.    Edward Phase I: Edward Score: 9    Edward Phase II:      Anesthesia Post Evaluation    Patient location during evaluation: PACU  Patient participation: complete - patient participated  Level of consciousness: awake and alert  Airway patency: patent  Nausea & Vomiting: no vomiting and no nausea  Cardiovascular status: hemodynamically stable  Respiratory status: acceptable and BIPAP  Hydration status: stable  Pain management: adequate    No notable events documented.

## 2024-07-18 PROBLEM — K21.9 GASTROESOPHAGEAL REFLUX DISEASE: Status: ACTIVE | Noted: 2024-07-18

## 2024-07-18 LAB
ALBUMIN SERPL BCG-MCNC: 3.8 G/DL (ref 3.5–5.1)
ALP SERPL-CCNC: 79 U/L (ref 38–126)
ALT SERPL W/O P-5'-P-CCNC: 11 U/L (ref 11–66)
ANION GAP SERPL CALC-SCNC: 12 MEQ/L (ref 8–16)
AST SERPL-CCNC: 15 U/L (ref 5–40)
BILIRUB SERPL-MCNC: 0.4 MG/DL (ref 0.3–1.2)
BUN SERPL-MCNC: 16 MG/DL (ref 7–22)
CALCIUM SERPL-MCNC: 8.3 MG/DL (ref 8.5–10.5)
CHLORIDE SERPL-SCNC: 104 MEQ/L (ref 98–111)
CO2 SERPL-SCNC: 24 MEQ/L (ref 23–33)
CREAT SERPL-MCNC: 1 MG/DL (ref 0.4–1.2)
DEPRECATED RDW RBC AUTO: 47.8 FL (ref 35–45)
ERYTHROCYTE [DISTWIDTH] IN BLOOD BY AUTOMATED COUNT: 13.2 % (ref 11.5–14.5)
GFR SERPL CREATININE-BSD FRML MDRD: 72 ML/MIN/1.73M2
GLUCOSE SERPL-MCNC: 119 MG/DL (ref 70–108)
HCT VFR BLD AUTO: 36.7 % (ref 42–52)
HGB BLD-MCNC: 12.1 GM/DL (ref 14–18)
MAGNESIUM SERPL-MCNC: 2 MG/DL (ref 1.6–2.4)
MCH RBC QN AUTO: 32.6 PG (ref 26–33)
MCHC RBC AUTO-ENTMCNC: 33 GM/DL (ref 32.2–35.5)
MCV RBC AUTO: 98.9 FL (ref 80–94)
MRSA DNA SPEC QL NAA+PROBE: NEGATIVE
PLATELET # BLD AUTO: 207 THOU/MM3 (ref 130–400)
PMV BLD AUTO: 10.1 FL (ref 9.4–12.4)
POTASSIUM SERPL-SCNC: 4.8 MEQ/L (ref 3.5–5.2)
PROT SERPL-MCNC: 6.4 G/DL (ref 6.1–8)
RBC # BLD AUTO: 3.71 MILL/MM3 (ref 4.7–6.1)
SODIUM SERPL-SCNC: 140 MEQ/L (ref 135–145)
WBC # BLD AUTO: 7.6 THOU/MM3 (ref 4.8–10.8)

## 2024-07-18 PROCEDURE — 51798 US URINE CAPACITY MEASURE: CPT

## 2024-07-18 PROCEDURE — 83735 ASSAY OF MAGNESIUM: CPT

## 2024-07-18 PROCEDURE — 99024 POSTOP FOLLOW-UP VISIT: CPT | Performed by: REGISTERED NURSE

## 2024-07-18 PROCEDURE — 2060000000 HC ICU INTERMEDIATE R&B

## 2024-07-18 PROCEDURE — 6370000000 HC RX 637 (ALT 250 FOR IP)

## 2024-07-18 PROCEDURE — 6360000002 HC RX W HCPCS: Performed by: REGISTERED NURSE

## 2024-07-18 PROCEDURE — 51701 INSERT BLADDER CATHETER: CPT

## 2024-07-18 PROCEDURE — 94640 AIRWAY INHALATION TREATMENT: CPT

## 2024-07-18 PROCEDURE — 6360000002 HC RX W HCPCS

## 2024-07-18 PROCEDURE — 94761 N-INVAS EAR/PLS OXIMETRY MLT: CPT

## 2024-07-18 PROCEDURE — 99222 1ST HOSP IP/OBS MODERATE 55: CPT

## 2024-07-18 PROCEDURE — 85027 COMPLETE CBC AUTOMATED: CPT

## 2024-07-18 PROCEDURE — 80053 COMPREHEN METABOLIC PANEL: CPT

## 2024-07-18 PROCEDURE — 99233 SBSQ HOSP IP/OBS HIGH 50: CPT | Performed by: INTERNAL MEDICINE

## 2024-07-18 PROCEDURE — 2700000000 HC OXYGEN THERAPY PER DAY

## 2024-07-18 PROCEDURE — 36415 COLL VENOUS BLD VENIPUNCTURE: CPT

## 2024-07-18 RX ORDER — HYDRALAZINE HYDROCHLORIDE 20 MG/ML
5 INJECTION INTRAMUSCULAR; INTRAVENOUS EVERY 6 HOURS PRN
Status: DISCONTINUED | OUTPATIENT
Start: 2024-07-18 | End: 2024-07-20 | Stop reason: HOSPADM

## 2024-07-18 RX ORDER — ALBUTEROL SULFATE 2.5 MG/3ML
1.25 SOLUTION RESPIRATORY (INHALATION)
Status: DISCONTINUED | OUTPATIENT
Start: 2024-07-18 | End: 2024-07-20 | Stop reason: HOSPADM

## 2024-07-18 RX ORDER — HYDRALAZINE HYDROCHLORIDE 20 MG/ML
10 INJECTION INTRAMUSCULAR; INTRAVENOUS ONCE
Status: DISCONTINUED | OUTPATIENT
Start: 2024-07-18 | End: 2024-07-20 | Stop reason: HOSPADM

## 2024-07-18 RX ADMIN — RANOLAZINE 500 MG: 500 TABLET, EXTENDED RELEASE ORAL at 20:41

## 2024-07-18 RX ADMIN — ISOSORBIDE MONONITRATE 60 MG: 60 TABLET, EXTENDED RELEASE ORAL at 08:28

## 2024-07-18 RX ADMIN — ALBUTEROL SULFATE 1.25 MG: 2.5 SOLUTION RESPIRATORY (INHALATION) at 08:32

## 2024-07-18 RX ADMIN — RANOLAZINE 500 MG: 500 TABLET, EXTENDED RELEASE ORAL at 08:28

## 2024-07-18 RX ADMIN — PANTOPRAZOLE SODIUM 40 MG: 40 TABLET, DELAYED RELEASE ORAL at 06:33

## 2024-07-18 RX ADMIN — LEVOTHYROXINE SODIUM 125 MCG: 0.12 TABLET ORAL at 06:33

## 2024-07-18 RX ADMIN — CARVEDILOL 25 MG: 25 TABLET, FILM COATED ORAL at 08:28

## 2024-07-18 RX ADMIN — HYDRALAZINE HYDROCHLORIDE 5 MG: 20 INJECTION INTRAMUSCULAR; INTRAVENOUS at 10:12

## 2024-07-18 RX ADMIN — ATORVASTATIN CALCIUM 40 MG: 40 TABLET, FILM COATED ORAL at 20:41

## 2024-07-18 RX ADMIN — ACETAMINOPHEN 650 MG: 325 TABLET ORAL at 16:44

## 2024-07-18 RX ADMIN — ALBUTEROL SULFATE 1.25 MG: 2.5 SOLUTION RESPIRATORY (INHALATION) at 20:54

## 2024-07-18 RX ADMIN — OXYCODONE 5 MG: 5 TABLET ORAL at 04:38

## 2024-07-18 RX ADMIN — ALBUTEROL SULFATE 1.25 MG: 2.5 SOLUTION RESPIRATORY (INHALATION) at 14:46

## 2024-07-18 RX ADMIN — CARVEDILOL 25 MG: 25 TABLET, FILM COATED ORAL at 16:44

## 2024-07-18 RX ADMIN — FLUTICASONE PROPIONATE 1 SPRAY: 50 SPRAY, METERED NASAL at 08:28

## 2024-07-18 ASSESSMENT — PAIN SCALES - GENERAL
PAINLEVEL_OUTOF10: 0
PAINLEVEL_OUTOF10: 2
PAINLEVEL_OUTOF10: 0
PAINLEVEL_OUTOF10: 0
PAINLEVEL_OUTOF10: 7
PAINLEVEL_OUTOF10: 2
PAINLEVEL_OUTOF10: 0
PAINLEVEL_OUTOF10: 0

## 2024-07-18 ASSESSMENT — PAIN DESCRIPTION - LOCATION
LOCATION: NECK;THROAT
LOCATION: LEG

## 2024-07-18 ASSESSMENT — PAIN DESCRIPTION - DESCRIPTORS: DESCRIPTORS: ACHING;DISCOMFORT

## 2024-07-18 ASSESSMENT — PAIN - FUNCTIONAL ASSESSMENT: PAIN_FUNCTIONAL_ASSESSMENT: ACTIVITIES ARE NOT PREVENTED

## 2024-07-18 ASSESSMENT — PAIN DESCRIPTION - FREQUENCY: FREQUENCY: INTERMITTENT

## 2024-07-18 ASSESSMENT — PAIN DESCRIPTION - PAIN TYPE: TYPE: CHRONIC PAIN

## 2024-07-18 ASSESSMENT — PAIN DESCRIPTION - ORIENTATION: ORIENTATION: LEFT

## 2024-07-18 ASSESSMENT — PAIN DESCRIPTION - ONSET: ONSET: GRADUAL

## 2024-07-18 NOTE — SIGNIFICANT EVENT
CC sign off. Spoke with ENT will consult hospitalist    Electronically signed by NINFA Trevino CNP on 7/18/2024 at 11:19 AM

## 2024-07-18 NOTE — PLAN OF CARE
Problem: Safety - Adult  Goal: Free from fall injury  7/18/2024 0910 by Kandi Verduzco, RN  Outcome: Progressing  Flowsheets (Taken 7/18/2024 0910)  Free From Fall Injury: Instruct family/caregiver on patient safety       Problem: Pain  Goal: Verbalizes/displays adequate comfort level or baseline comfort level  7/18/2024 0910 by Kandi Verduzco, RN  Outcome: Progressing  Flowsheets (Taken 7/18/2024 0910)  Verbalizes/displays adequate comfort level or baseline comfort level:   Encourage patient to monitor pain and request assistance   Assess pain using appropriate pain scale   Administer analgesics based on type and severity of pain and evaluate response

## 2024-07-18 NOTE — CONSULTS
CRITICAL CARE PROGRESS NOTE      Patient:  Franki Thao    Unit/Bed:4B-06/006-A  YOB: 1935  MRN: [id number]   PCP: Sharmila Steen MD  Date of Admission: 7/17/2024  Chief Complaint:-Dyspnea and stridor on exertion    Assessment and Plan:    Dyspnea and stridor on exertion: S/p laryngeal syncope with arytenoidectomy, laryngeal plasty with flap 7/17 with Dr. Duenas without complications.  Patient was given dose of Unasyn, dexamethasone preop.  Will add low-dose morphine as needed for pain  Continue on heated high flow nasal cannula  N.p.o.  Holding aspirin and Plavix due to recent surgery, will resume at primary team's discretion  HTN: Continue Coreg, Imdur and Ranexa with hold parameters  Hypothyroidism: Thyroid  GERD: Tonics      INITIAL H AND P AND ICU COURSE:  Per HPI \"the patient is an 88-year-old male with a history of a recent surgical procedure that was complicated by failed extubation x 3 or 4 events all associated with having the breathing tube replaced and then removed finally successfully getting him extubated. 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 This gives the appearance of bilateral paralysis and airway encroachment that likely will be amenable to the published operation of \"transoral laryngoplasty with augmentation lateralization\". This is the operation he comes for today. He reports the level of dyspnea that he has to be about the same as it was when he was in my office and that his voice is about the same as it was that as well. His family, who is with him, concur.\"    Past Medical History: CAD, HLD, HTN, Hx DVT, hypothyroidism.  Family History: Mother had diabetes and CVA.  Father

## 2024-07-18 NOTE — PROGRESS NOTES
Department of Otolaryngology  Progress Note    Chief Complaint:  POD 1 Laryngoscopy with Arytenoidectomy, Laryngoplasty with Flap     SUBJECTIVE 7/18/24:  Patient voices some stridor approximately three times overnight and into this morning. He states this is stable in comparison to the frequency of stridor he experiences at home. Patient states something that significantly helps with his breathing at home is his albuterol nebulizer that he usually takes three times per day; requesting it this morning. He voices his breathing feels slightly improved from baseline this morning post operatively. No reports of hemoptysis, fevers/chills. He is eager to have his diet advanced. Concerns for hypertension noted by nursing.    REVIEW OF SYSTEMS:    Pertinent positives as noted in the HPI. All other systems reviewed and negative.    OBJECTIVE      Physical  VITALS:  BP (!) 141/70   Pulse 68   Temp 98.4 °F (36.9 °C) (Oral)   Resp 19   Ht 1.727 m (5' 8\")   Wt 73 kg (160 lb 15 oz)   SpO2 100%   BMI 24.47 kg/m²     This is a 88 y.o. male. Patient is alert and oriented to person, place and time. Patient appears well developed, well nourished. Mood is happy with normal affect. Patient resting in hospital bed in no acute distress at time of arrival. HFNC in place and patient with breathing unlabored. Patient asleep upon arrival; wakens to verbal stimuli. Raspy and hoarse voice noted. At time of arrival no evidence of stridor. With patient speaking and holding conversation he started coughing with brief episode of stridor that resolved with deep breathing and sip of water. No coughing or concern for aspiration with swallow of water. No evidence of cyanosis during stridorous episode and SpO2 remained 100%. Episode brief and lasted approximately 5 seconds. Anterior neck soft to palpation without crepitus, induration, or fluctuance. Oropharynx intact and clear.    Data  CBC with Differential:    Lab Results   Component Value  ASA/Plavix  -okay for transfer to stepdown today with hospitalist consult for medical/HTN management due to needs for IV hydralazine  -Albuterol nebulizer scheduled  -Post operative pain medicine as ordered  -Anticipate discharge in the next 24-48 hours pending clinical progression    *if patient would require re-intubation #6 ETT would be recommended    Patient's history/symptoms, exam, labs/imaging were reviewed with Dr Duenas and treatment plan was made in collaboration with him.        Electronically signed by NINFA Clarke CNP on 7/18/2024 at 7:57 AM

## 2024-07-18 NOTE — CONSULTS
Hospitalist Consult Note  Internal Medicine Resident      Patient: Franki Thao 88 y.o. male      Unit/Bed: [facility]06/006-A    Admit Date: 7/17/2024    ASSESSMENT AND PLAN  Active Problems  Dyspnea/stridor on exertion: POD 1. S/p Laryngoscopy w/ Arytenoidectomy, Laryngoplasty.   - Managed per primary  - On heated high flow nasal cannula, okay per surgery for clear liquid diet  - ASA and Plavix continue to be held  - Postoperative pain management per primary  - Noted if patient requires reintubation #6 ETT per ENT recommendation  Hypertension: Elevated BP a.m. of 7/18/2024.  Possibly secondary to anxiety/pain.  Medications restarted.  Patient required 1 dose hydralazine, with improvement in BP  -Coreg 25 mg, twice daily  -Continue Imdur 60 mg, daily  -Continue Ranexa 500 mg, twice daily  -Hold parameters in place  -- Hydralazine 5 mg every 6 hours as needed for SBP greater than 175  -- Continue monitor BP for need of BP gtt.  Hypothyroidism: Continue Synthroid 125 mcg, daily  GERD: Continue Protonix 40 mg, day  PAD: On DAPT, held at this time. Defer restarting per primary    ===================================================================    Reason for Consult:   - Hypertension  - Medication Management  Subjective (past 24 hours): Seen evaluated bedside, VSS.  Patient's blood pressure improved following hydralazine x 1 dose.  Continuing home medications as time, hold parameters in place.  Denies fever, chills, shortness of breath, difficulty breathing chest pain abdominal pain nausea or vomiting.  On high flow nasal cannula, tolerating well.  It is reported patient had some stridor but stable compared to previous frequency of stridor episodes.  He is being transferred out of ICU.  Continue pain management and breathing treatments per ENT.  In no acute distress, no respiratory distress.    HPI / Hospital Course:  Franki Thao.  Is an 88-year-old male with a past medical history pertinent for recent surgical

## 2024-07-18 NOTE — PLAN OF CARE
Problem: Discharge Planning  Goal: Discharge to home or other facility with appropriate resources  7/18/2024 0326 by Crow Valderrama RN  Outcome: Progressing  7/18/2024 0325 by Crow Valderrama RN  Outcome: Progressing  Flowsheets (Taken 7/17/2024 2000)  Discharge to home or other facility with appropriate resources: Identify barriers to discharge with patient and caregiver     Problem: Safety - Adult  Goal: Free from fall injury  7/18/2024 0326 by Crow Valderrama RN  Outcome: Progressing  7/18/2024 0325 by Crow Valderrama RN  Outcome: Progressing  Flowsheets (Taken 7/17/2024 2000)  Free From Fall Injury: Instruct family/caregiver on patient safety     Problem: ABCDS Injury Assessment  Goal: Absence of physical injury  7/18/2024 0326 by Crow Valderrama RN  Outcome: Progressing  7/18/2024 0325 by Crow Valderrama RN  Outcome: Progressing  Flowsheets (Taken 7/17/2024 2000)  Absence of Physical Injury: Implement safety measures based on patient assessment     Problem: Pain  Goal: Verbalizes/displays adequate comfort level or baseline comfort level  7/18/2024 0326 by Crow Valderrama RN  Outcome: Progressing  7/18/2024 0325 by Crow Valderrama RN  Outcome: Progressing     Problem: Skin/Tissue Integrity  Goal: Absence of new skin breakdown  Description: 1.  Monitor for areas of redness and/or skin breakdown  2.  Assess vascular access sites hourly  3.  Every 4-6 hours minimum:  Change oxygen saturation probe site  4.  Every 4-6 hours:  If on nasal continuous positive airway pressure, respiratory therapy assess nares and determine need for appliance change or resting period.  7/18/2024 0326 by Crow Valderrama RN  Outcome: Progressing  7/18/2024 0325 by Crow Valderrama RN  Outcome: Progressing

## 2024-07-18 NOTE — PROGRESS NOTES
[facility]   PROGRESS NOTE      Patient: Franki Thao  Room #: 4B-06/006-A            YOB: 1935  Age: 88 y.o.  Gender: male            Admit Date & Time: 7/17/2024 10:40 AM    Assessment:    The patient welcomed prayer but had a recent procedure which makes speech untenable.     Interventions:  The patient was provided personalized prayer. The family also shared that patient was active in his local Hinduism which was not listed. This information was updated.     Outcomes:  The patient was thankful for the provided prayer.     Plan:  1.Spiritual care will continue to follow the patient according to [facility] spiritual care SOP.       Electronically signed by Chaplain August, on 7/18/2024 at 10:25 AM.  Spiritual Care Department  [facility]  [phone]     07/18/24 1022   Encounter Summary   Encounter Overview/Reason Initial Encounter   Service Provided For Patient;Family;Patient and family together;Significant other   Referral/Consult From Nurse   Support System Spouse;Children;Family members;Buddhist/chetan community;Friends/neighbors   Last Encounter  07/18/24   Complexity of Encounter Low   Begin Time 0955   End Time  1020   Total Time Calculated 25 min   Spiritual/Emotional needs   Type Spiritual Support   Assessment/Intervention/Outcome   Assessment Calm;Coping;Hopeful   Intervention Active listening;Prayer (assurance of)/[place]   Outcome Encouraged;Expressed Gratitude

## 2024-07-18 NOTE — PROGRESS NOTES
CRITICAL CARE PROGRESS NOTE      Patient:  Franki Thao    Unit/Bed:4B-06/006-A  YOB: 1935  MRN: [id number]   PCP: Sharmila Steen MD  Date of Admission: 7/17/2024  Chief Complaint:-Dyspnea and stridor on exertion    Assessment and Plan:    Dyspnea and stridor on exertion: S/p laryngeal syncope with arytenoidectomy, laryngeal plasty with flap 7/17 with Dr. Duenas without complications.  Patient was given dose of Unasyn, dexamethasone preop.  Continue today with low-dose morphine as needed for pain, on heated high flow nasal cannula.  Remained NPO.  Anticoagulation/platelets were held.  HTN: Continue Coreg, Imdur and Ranexa with hold parameters  Hypothyroidism: Thyroid  GERD: Tonics      INITIAL H AND P AND ICU COURSE:  Per HPI \"the patient is an 88-year-old male with a history of a recent surgical procedure that was complicated by failed extubation x 3 or 4 events all associated with having the breathing tube replaced and then removed finally successfully getting him extubated. 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 This gives the appearance of bilateral paralysis and airway encroachment that likely will be amenable to the published operation of \"transoral laryngoplasty with augmentation lateralization\". This is the operation he comes for today. He reports the level of dyspnea that he has to be about the same as it was when he was in my office and that his voice is about the same as it was that as well. His family, who is with him, concur.\"    7/18: Patient was resting comfortably at bedside.  He was hard of hearing, but able to answer my questions comprehensively.  Family was present at bedside.   apical region, interstitial pneumonia/edema L>R      Meets Continued ICU Level Care Criteria:      Case and plan discussed with Dr. Baxter.    Electronically signed by Ras Calixto DO  Patient seen by me including key components of medical care.  Case discussed with resident physician.  Case discussed with Dr. Hazel.  Continue to assess airway for any evidence of compromise.   Italicized bold font, if present,  represents changes to the note made by me.  Time was discontiguous. Time does not include procedure. Time does include my direct assessment of the patient and coordination of care.  Time represents more than 50% of the time involved with patient care by the CC team.  Electronically signed by Justin Baxter MD.

## 2024-07-19 LAB
ANION GAP SERPL CALC-SCNC: 9 MEQ/L (ref 8–16)
BUN SERPL-MCNC: 15 MG/DL (ref 7–22)
CALCIUM SERPL-MCNC: 8.1 MG/DL (ref 8.5–10.5)
CHLORIDE SERPL-SCNC: 103 MEQ/L (ref 98–111)
CO2 SERPL-SCNC: 27 MEQ/L (ref 23–33)
CREAT SERPL-MCNC: 0.9 MG/DL (ref 0.4–1.2)
DEPRECATED RDW RBC AUTO: 47.5 FL (ref 35–45)
ERYTHROCYTE [DISTWIDTH] IN BLOOD BY AUTOMATED COUNT: 13.2 % (ref 11.5–14.5)
GFR SERPL CREATININE-BSD FRML MDRD: 82 ML/MIN/1.73M2
GLUCOSE SERPL-MCNC: 98 MG/DL (ref 70–108)
HCT VFR BLD AUTO: 36.2 % (ref 42–52)
HGB BLD-MCNC: 11.7 GM/DL (ref 14–18)
MAGNESIUM SERPL-MCNC: 2 MG/DL (ref 1.6–2.4)
MCH RBC QN AUTO: 31.9 PG (ref 26–33)
MCHC RBC AUTO-ENTMCNC: 32.3 GM/DL (ref 32.2–35.5)
MCV RBC AUTO: 98.6 FL (ref 80–94)
PLATELET # BLD AUTO: 209 THOU/MM3 (ref 130–400)
PMV BLD AUTO: 10.1 FL (ref 9.4–12.4)
POTASSIUM SERPL-SCNC: 4 MEQ/L (ref 3.5–5.2)
RBC # BLD AUTO: 3.67 MILL/MM3 (ref 4.7–6.1)
SODIUM SERPL-SCNC: 139 MEQ/L (ref 135–145)
WBC # BLD AUTO: 10.4 THOU/MM3 (ref 4.8–10.8)

## 2024-07-19 PROCEDURE — 51701 INSERT BLADDER CATHETER: CPT

## 2024-07-19 PROCEDURE — 94640 AIRWAY INHALATION TREATMENT: CPT

## 2024-07-19 PROCEDURE — 99024 POSTOP FOLLOW-UP VISIT: CPT | Performed by: REGISTERED NURSE

## 2024-07-19 PROCEDURE — 80048 BASIC METABOLIC PNL TOTAL CA: CPT

## 2024-07-19 PROCEDURE — 83735 ASSAY OF MAGNESIUM: CPT

## 2024-07-19 PROCEDURE — 94761 N-INVAS EAR/PLS OXIMETRY MLT: CPT

## 2024-07-19 PROCEDURE — 51798 US URINE CAPACITY MEASURE: CPT

## 2024-07-19 PROCEDURE — 2700000000 HC OXYGEN THERAPY PER DAY

## 2024-07-19 PROCEDURE — 2060000000 HC ICU INTERMEDIATE R&B

## 2024-07-19 PROCEDURE — 6360000002 HC RX W HCPCS: Performed by: REGISTERED NURSE

## 2024-07-19 PROCEDURE — 85027 COMPLETE CBC AUTOMATED: CPT

## 2024-07-19 PROCEDURE — 36415 COLL VENOUS BLD VENIPUNCTURE: CPT

## 2024-07-19 PROCEDURE — 6370000000 HC RX 637 (ALT 250 FOR IP)

## 2024-07-19 PROCEDURE — 99231 SBSQ HOSP IP/OBS SF/LOW 25: CPT | Performed by: INTERNAL MEDICINE

## 2024-07-19 RX ADMIN — RANOLAZINE 500 MG: 500 TABLET, EXTENDED RELEASE ORAL at 08:46

## 2024-07-19 RX ADMIN — RANOLAZINE 500 MG: 500 TABLET, EXTENDED RELEASE ORAL at 20:19

## 2024-07-19 RX ADMIN — CARVEDILOL 25 MG: 25 TABLET, FILM COATED ORAL at 17:14

## 2024-07-19 RX ADMIN — LEVOTHYROXINE SODIUM 125 MCG: 0.12 TABLET ORAL at 05:07

## 2024-07-19 RX ADMIN — ALBUTEROL SULFATE 1.25 MG: 2.5 SOLUTION RESPIRATORY (INHALATION) at 12:11

## 2024-07-19 RX ADMIN — ALBUTEROL SULFATE 1.25 MG: 2.5 SOLUTION RESPIRATORY (INHALATION) at 08:15

## 2024-07-19 RX ADMIN — ATORVASTATIN CALCIUM 40 MG: 40 TABLET, FILM COATED ORAL at 20:19

## 2024-07-19 RX ADMIN — ISOSORBIDE MONONITRATE 60 MG: 60 TABLET, EXTENDED RELEASE ORAL at 08:44

## 2024-07-19 RX ADMIN — PANTOPRAZOLE SODIUM 40 MG: 40 TABLET, DELAYED RELEASE ORAL at 05:04

## 2024-07-19 RX ADMIN — FLUTICASONE PROPIONATE 1 SPRAY: 50 SPRAY, METERED NASAL at 08:49

## 2024-07-19 RX ADMIN — CARVEDILOL 25 MG: 25 TABLET, FILM COATED ORAL at 08:46

## 2024-07-19 RX ADMIN — ALBUTEROL SULFATE 1.25 MG: 2.5 SOLUTION RESPIRATORY (INHALATION) at 15:44

## 2024-07-19 RX ADMIN — ALBUTEROL SULFATE 1.25 MG: 2.5 SOLUTION RESPIRATORY (INHALATION) at 21:11

## 2024-07-19 ASSESSMENT — PAIN SCALES - GENERAL
PAINLEVEL_OUTOF10: 0

## 2024-07-19 NOTE — CARE COORDINATION
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 Plan discharge tomorrow. Remains on HFNC per protocol, 40L, 21% FIO2, sats 94%. Afebrile. NSR. Ox4. Follows commands. ENT and Hospitalist. Telemetry, SCDs. Nebs, Prn IV hydralazine, prn IV morphine, prn roxicodone, Electrolyte replacement protocols.      PCP: Sharmila Steen MD  Readmission Risk Score: 11.7    Patient Goals/Plan/Treatment Preferences:  Home with wife. Has cane, SC, and nebs. Denies needs, declines HH.     7/19/24, 3:56 PM EDT    Patient goals/plan/ treatment preferences discussed by  and .  Patient goals/plan/ treatment preferences reviewed with patient/ family.  Patient/ family verbalize understanding of discharge plan and are in agreement with goal/plan/treatment preferences.  Understanding was demonstrated using the teach back method.  AVS provided by RN at time of discharge, which includes all necessary medical information pertaining to the patients current course of illness, treatment, post-discharge goals of care, and treatment preferences.     Services At/After Discharge: None         
Case Management Assessment Initial Evaluation    Date/Time of Evaluation: 2024 8:48 AM  Assessment Completed by: Keke Miles RN    If patient is discharged prior to next notation, then this note serves as note for discharge by case management.    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 Intensivist consulted. Placed on HFNC. Order to transfer to stepdown today; awaiting bed assignment.     Remains on HFNC, 40L, 21% FIO2, sats 99%. Afebrile. NSR. Ox4. Follows commands. ENT and Hospitalist. Telemetry, SCDs. Prn IV hydralazine, prn IV morphine, prn roxicodone, Electrolyte replacement protocols. Received IV unasyn x1, 10 mg IV decadron x1, IV hydralazine 10 mg x1, and racemic epi x2 yesterday.     Procedures:    Laryngoscopy with Arytenoidectomy, Laryngoplasty with Flap     Imagin/17 CXR: 1. Poor inflation of the lungs. Borderline heart size. Metallic sternotomy  sutures from prior surgery. Left shoulder prosthesis. Prior anterior cervical  fusion. There are few vascular clips projected over the right clavicular head.  2. Moderate soft tissue fullness right apical region, possible related to a  prominent thyroid gland or prominent tortuous brachiocephalic vessels.  3. Moderate interstitial pneumonia/edema involving both lungs relatively  diffusely, worse on the left. Question tiny bilateral pleural effusions.    Patient Goals/Plan/Treatment Preferences: Home with wife. Has cane, SC, and nebs. Denies 
Spontaneous, unlabored and symmetrical

## 2024-07-19 NOTE — PROGRESS NOTES
Department of Otolaryngology  Progress Note    Chief Complaint:  POD 2 Laryngoscopy with Arytenoidectomy, Laryngoplasty with Flap     SUBJECTIVE 7/19/24:  Patient voices doing well this morning. No acute events overnight or this morning. He states he did not have any more of the stridorous breathing overnight that he is aware of. Patient continues on HFNC and is eager to have activity advanced today. Spoke with wife over the phone and she feels they have everything they need equipment wise at home for safety/care. Wife reports unsure for discharge today due to having family out of town and ensuring his strength is improved to get into the house and do ADLs.    REVIEW OF SYSTEMS:    Pertinent positives as noted in the HPI. All other systems reviewed and negative.    OBJECTIVE      Physical  VITALS:  BP (!) 126/56   Pulse 60   Temp 98 °F (36.7 °C) (Oral)   Resp 17   Ht 1.727 m (5' 8\")   Wt 73 kg (160 lb 15 oz)   SpO2 97%   BMI 24.47 kg/m²     This is a 88 y.o. male. Patient is alert and oriented to person, place and time. Patient appears well developed, well nourished. Mood is happy with normal affect. Patient resting in hospital bed in no acute distress at time of arrival. HFNC in place and patient with breathing unlabored.  Raspy and mildly hoarse voice noted. At time of arrival no evidence of stridor. Lung sounds with mild expiratory wheezing; more prominent to right lung fields. With patient speaking and holding conversation no evidence of stridor or acute airway distress.  Anterior neck soft to palpation without crepitus, induration, or fluctuance. Oropharynx intact and clear.     Data  CBC with Differential:    Lab Results   Component Value Date/Time    WBC 10.4 07/19/2024 03:25 AM    RBC 3.67 07/19/2024 03:25 AM    RBC 4.40 11/22/2011 08:50 AM    HGB 11.7 07/19/2024 03:25 AM    HGB 10.7 12/11/2011 05:46 AM    HCT 36.2 07/19/2024 03:25 AM     07/19/2024 03:25 AM    MCV 98.6 07/19/2024 03:25 AM

## 2024-07-19 NOTE — PLAN OF CARE
Problem: Discharge Planning  Goal: Discharge to home or other facility with appropriate resources  Outcome: Progressing  Flowsheets (Taken 7/18/2024 2000)  Discharge to home or other facility with appropriate resources:   Identify barriers to discharge with patient and caregiver   Arrange for needed discharge resources and transportation as appropriate   Identify discharge learning needs (meds, wound care, etc)   Refer to discharge planning if patient needs post-hospital services based on physician order or complex needs related to functional status, cognitive ability or social support system     Problem: Safety - Adult  Goal: Free from fall injury  7/18/2024 2136 by Lamine Madera, RN  Outcome: Progressing  Flowsheets (Taken 7/18/2024 1915)  Free From Fall Injury:   Instruct family/caregiver on patient safety   Based on caregiver fall risk screen, instruct family/caregiver to ask for assistance with transferring infant if caregiver noted to have fall risk factors  7/18/2024 0910 by Kandi Verduzco RN  Outcome: Progressing  Flowsheets (Taken 7/18/2024 0910)  Free From Fall Injury: Instruct family/caregiver on patient safety     Problem: ABCDS Injury Assessment  Goal: Absence of physical injury  Outcome: Progressing  Flowsheets (Taken 7/18/2024 1915)  Absence of Physical Injury: Implement safety measures based on patient assessment     Problem: Pain  Goal: Verbalizes/displays adequate comfort level or baseline comfort level  7/18/2024 2136 by Lamine Madera, RN  Outcome: Progressing  Flowsheets (Taken 7/18/2024 2000)  Verbalizes/displays adequate comfort level or baseline comfort level:   Encourage patient to monitor pain and request assistance   Assess pain using appropriate pain scale   Administer analgesics based on type and severity of pain and evaluate response   Implement non-pharmacological measures as appropriate and evaluate response   Consider cultural and social influences on pain and pain management

## 2024-07-19 NOTE — PROGRESS NOTES
Physician Progress Note      PATIENT:               VISH BRAND  [facility] #:                  [id number]  :                       1935  ADMIT DATE:       2024 10:40 AM  DISCH DATE:  RESPONDING  PROVIDER #:        Dona Ho DO          QUERY TEXT:    Patient admitted with stenosis of larynx, noted to have + urine culture. If   possible, please document in progress notes and discharge summary if you are   evaluating and/or treating any of the following:    The medical record reflects the following:  Risk Factors: + urine culture  Clinical Indicators: urine culture done  was + gram negative bacilli,   colony count <10,000 CFU/mL  Treatment: Labs, imaging, monitoring, no ATB ordered  Options provided:  -- UTI confirmed by positive urine culture, POA  -- Positive urine culture not clinically significant  -- Other - I will add my own diagnosis  -- Disagree - Not applicable / Not valid  -- Disagree - Clinically unable to determine / Unknown  -- Refer to Clinical Documentation Reviewer    PROVIDER RESPONSE TEXT:    Positive urine culture is not clinically significant.    Query created by: Meredith Martin on 2024 9:59 AM      Electronically signed by:  Dona Ho DO 2024 11:14   AM

## 2024-07-20 VITALS
BODY MASS INDEX: 24.39 KG/M2 | TEMPERATURE: 97.7 F | HEART RATE: 66 BPM | HEIGHT: 68 IN | OXYGEN SATURATION: 95 % | RESPIRATION RATE: 20 BRPM | SYSTOLIC BLOOD PRESSURE: 135 MMHG | WEIGHT: 160.94 LBS | DIASTOLIC BLOOD PRESSURE: 55 MMHG

## 2024-07-20 LAB
ANION GAP SERPL CALC-SCNC: 12 MEQ/L (ref 8–16)
BACTERIA UR CULT: ABNORMAL
BUN SERPL-MCNC: 17 MG/DL (ref 7–22)
CALCIUM SERPL-MCNC: 8.4 MG/DL (ref 8.5–10.5)
CHLORIDE SERPL-SCNC: 104 MEQ/L (ref 98–111)
CO2 SERPL-SCNC: 25 MEQ/L (ref 23–33)
CREAT SERPL-MCNC: 0.9 MG/DL (ref 0.4–1.2)
DEPRECATED RDW RBC AUTO: 47 FL (ref 35–45)
ERYTHROCYTE [DISTWIDTH] IN BLOOD BY AUTOMATED COUNT: 13.1 % (ref 11.5–14.5)
GFR SERPL CREATININE-BSD FRML MDRD: 82 ML/MIN/1.73M2
GLUCOSE SERPL-MCNC: 97 MG/DL (ref 70–108)
HCT VFR BLD AUTO: 38.8 % (ref 42–52)
HGB BLD-MCNC: 12.3 GM/DL (ref 14–18)
MAGNESIUM SERPL-MCNC: 1.9 MG/DL (ref 1.6–2.4)
MCH RBC QN AUTO: 31.5 PG (ref 26–33)
MCHC RBC AUTO-ENTMCNC: 31.7 GM/DL (ref 32.2–35.5)
MCV RBC AUTO: 99.2 FL (ref 80–94)
ORGANISM: ABNORMAL
PLATELET # BLD AUTO: 208 THOU/MM3 (ref 130–400)
PMV BLD AUTO: 9.8 FL (ref 9.4–12.4)
POTASSIUM SERPL-SCNC: 4.1 MEQ/L (ref 3.5–5.2)
RBC # BLD AUTO: 3.91 MILL/MM3 (ref 4.7–6.1)
SODIUM SERPL-SCNC: 141 MEQ/L (ref 135–145)
WBC # BLD AUTO: 9.2 THOU/MM3 (ref 4.8–10.8)

## 2024-07-20 PROCEDURE — 6360000002 HC RX W HCPCS: Performed by: REGISTERED NURSE

## 2024-07-20 PROCEDURE — 36415 COLL VENOUS BLD VENIPUNCTURE: CPT

## 2024-07-20 PROCEDURE — 83735 ASSAY OF MAGNESIUM: CPT

## 2024-07-20 PROCEDURE — 6370000000 HC RX 637 (ALT 250 FOR IP)

## 2024-07-20 PROCEDURE — 94640 AIRWAY INHALATION TREATMENT: CPT

## 2024-07-20 PROCEDURE — 85027 COMPLETE CBC AUTOMATED: CPT

## 2024-07-20 PROCEDURE — 94761 N-INVAS EAR/PLS OXIMETRY MLT: CPT

## 2024-07-20 PROCEDURE — 99231 SBSQ HOSP IP/OBS SF/LOW 25: CPT | Performed by: INTERNAL MEDICINE

## 2024-07-20 PROCEDURE — 80048 BASIC METABOLIC PNL TOTAL CA: CPT

## 2024-07-20 PROCEDURE — 2700000000 HC OXYGEN THERAPY PER DAY

## 2024-07-20 RX ORDER — CLOPIDOGREL BISULFATE 75 MG/1
75 TABLET ORAL DAILY
Qty: 30 TABLET | Refills: 3 | Status: SHIPPED
Start: 2024-07-20

## 2024-07-20 RX ORDER — CIPROFLOXACIN 500 MG/1
500 TABLET, FILM COATED ORAL 2 TIMES DAILY
Qty: 14 TABLET | Refills: 0 | Status: SHIPPED | OUTPATIENT
Start: 2024-07-20 | End: 2024-07-27

## 2024-07-20 RX ADMIN — PANTOPRAZOLE SODIUM 40 MG: 40 TABLET, DELAYED RELEASE ORAL at 06:26

## 2024-07-20 RX ADMIN — CARVEDILOL 25 MG: 25 TABLET, FILM COATED ORAL at 09:03

## 2024-07-20 RX ADMIN — ISOSORBIDE MONONITRATE 60 MG: 60 TABLET, EXTENDED RELEASE ORAL at 09:03

## 2024-07-20 RX ADMIN — LEVOTHYROXINE SODIUM 125 MCG: 0.12 TABLET ORAL at 06:26

## 2024-07-20 RX ADMIN — ASPIRIN 81 MG: 81 TABLET, COATED ORAL at 09:03

## 2024-07-20 RX ADMIN — FLUTICASONE PROPIONATE 1 SPRAY: 50 SPRAY, METERED NASAL at 09:03

## 2024-07-20 RX ADMIN — RANOLAZINE 500 MG: 500 TABLET, EXTENDED RELEASE ORAL at 09:03

## 2024-07-20 RX ADMIN — ALBUTEROL SULFATE 1.25 MG: 2.5 SOLUTION RESPIRATORY (INHALATION) at 12:22

## 2024-07-20 RX ADMIN — ALBUTEROL SULFATE 1.25 MG: 2.5 SOLUTION RESPIRATORY (INHALATION) at 08:25

## 2024-07-20 ASSESSMENT — PAIN SCALES - GENERAL
PAINLEVEL_OUTOF10: 0
PAINLEVEL_OUTOF10: 0

## 2024-07-20 NOTE — PROGRESS NOTES
Writer went over discharge paperwork with patient as well as wife at bedside, including follow-up appointments and medication education. Patient and wife verbalized understanding.

## 2024-07-20 NOTE — DISCHARGE INSTRUCTIONS
Continue ASA daily  Hold Plavix until 1 week post operative: 7/24 and then okay to resume if no bloody spit/hemoptysis  Take oral antibiotic as prescribed  Utilize saline nebulizer as ordered  Call with any questions or concerns

## 2024-07-20 NOTE — PLAN OF CARE
Problem: Discharge Planning  Goal: Discharge to home or other facility with appropriate resources  Discharge to home or other facility with appropriate resources:   Identify barriers to discharge with patient and caregiver   Identify discharge learning needs (meds, wound care, etc)   Refer to discharge planning if patient needs post-hospital services based on physician order or complex needs related to functional status, cognitive ability or social support system     Problem: Safety - Adult  Goal: Free from fall injury  7/20/2024 1134 by Kandi Verduzco, RN  Outcome: Progressing  Flowsheets (Taken 7/20/2024 1134)  Free From Fall Injury: Instruct family/caregiver on patient safety      Problem: Pain  Goal: Verbalizes/displays adequate comfort level or baseline comfort level  7/20/2024 1134 by Kandi Verduzco, RN  Outcome: Progressing  Flowsheets (Taken 7/20/2024 1134)  Verbalizes/displays adequate comfort level or baseline comfort level:   Encourage patient to monitor pain and request assistance   Assess pain using appropriate pain scale   Administer analgesics based on type and severity of pain and evaluate response

## 2024-07-20 NOTE — PLAN OF CARE
Problem: Discharge Planning  Goal: Discharge to home or other facility with appropriate resources  Outcome: Adequate for Discharge     Problem: Safety - Adult  Goal: Free from fall injury  7/20/2024 1307 by Kandi Verduzco RN  Outcome: Adequate for Discharge  7/20/2024 1134 by Kandi Verduzco RN  Outcome: Progressing  Flowsheets (Taken 7/20/2024 1134)  Free From Fall Injury: Instruct family/caregiver on patient safety     Problem: ABCDS Injury Assessment  Goal: Absence of physical injury  Outcome: Adequate for Discharge     Problem: Pain  Goal: Verbalizes/displays adequate comfort level or baseline comfort level  7/20/2024 1307 by Kandi Verduzco RN  Outcome: Adequate for Discharge  7/20/2024 1134 by Kandi Verduzco RN  Outcome: Progressing  Flowsheets (Taken 7/20/2024 1134)  Verbalizes/displays adequate comfort level or baseline comfort level:   Encourage patient to monitor pain and request assistance   Assess pain using appropriate pain scale   Administer analgesics based on type and severity of pain and evaluate response     Problem: Skin/Tissue Integrity  Goal: Absence of new skin breakdown  Description: 1.  Monitor for areas of redness and/or skin breakdown  2.  Assess vascular access sites hourly  3.  Every 4-6 hours minimum:  Change oxygen saturation probe site  4.  Every 4-6 hours:  If on nasal continuous positive airway pressure, respiratory therapy assess nares and determine need for appliance change or resting period.  Outcome: Adequate for Discharge     Problem: Respiratory - Adult  Goal: Achieves optimal ventilation and oxygenation  Outcome: Adequate for Discharge  Flowsheets (Taken 7/20/2024 0825 by Amos Castellanos RCP)  Achieves optimal ventilation and oxygenation:   Assess for changes in respiratory status   Respiratory therapy support as indicated   Assess and instruct to report shortness of breath or any respiratory difficulty

## 2024-07-20 NOTE — DISCHARGE SUMMARY
DISCHARGE SUMMARY    Pt Name: Franki Thao  MRN: [id number]  YOB: 1935  Primary Care Physician: Sharmila Steen MD    Admit date:  7/17/2024 10:40 AM  Discharge date:  7/20/24  Disposition: Home  Admitting Diagnosis:   1. Glottic stenosis    2. Bilateral vocal cord paresis    3. 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    Pulmonary embolism (HCC) I26.99    Angina pectoris, unstable (HCC) I20.0    Atrophic kidney N26.1    Chest pain R07.9    Chronic kidney insufficiency, stage 3 (moderate) (HCC) N18.30    Primary hypertension I10    History of pulmonary embolism Z86.711    Hypothyroidism E03.9    IFG (impaired fasting glucose) R73.01    MRSA nasal colonization Z22.322    Neurogenic bladder N31.9    Pulmonary fibrosis (HCC) J84.10    Recurrent UTI N39.0    Zenker's diverticulum K22.5    ASHD (arteriosclerotic heart disease) I25.10    Stridor R06.1    Degenerative disc disease, cervical M50.30    Glottic stenosis J38.6    Bilateral vocal cord paresis J38.02    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 Duenas. He was admitted post operatively for close airway monitoring with diet and activity slowly advanced.  At time of discharge, Franki was tolerating a pureed diet, having bowel movements, ambulating on his own accord and had adequate analgesia on oral pain medications. Patient has longstanding  to Ciprofloxacin  Time Spent for discharge: 20 minutes    Electronically signed by NINFA Clarke CNP on 7/20/2024 at 10:15 AM

## 2024-07-23 NOTE — PROGRESS NOTES
Physician Progress Note      PATIENT:               VISH BRAND  [facility] #:                  [id number]  :                       1935  ADMIT DATE:       2024 10:40 AM  DISCH DATE:        2024 5:30 PM  RESPONDING  PROVIDER #:        Marleny Pritchard CNP          QUERY TEXT:    Pt admitted with stenosis of larynx.  Pt noted to have hypercapnia and be on   HFNC post extubation. If possible, please document in the progress notes and   discharge summary if you are evaluating and/or treating any of the following:    The medical record reflects the following:  Risk Factors: surgery, laryngeal stenosis  Clinical Indicators: post-operatively, extubated and placed on HFNC with   hypercapnia noted in progress notes from ENT; AB24 16:22  pH, Blood Gas: 7.18 (LL)  PCO2: 76 (HH)  pO2: 198 (H)  HCO3: 28  Base Excess (Calculated): -1.8  O2 Sat: 99  Treatment: Supplemental oxygen, surgery to fix stenosis, monitoring in ICU  Options provided:  -- Acute respiratory failure with hypercapnia, not related to surgery  -- Post-operative acute respiratory failure with hypercapnia due to surgery  -- Acute respiratory failure with hypoxia and hypercapnia not related to   surgery  -- Acute post operative respiratory failure with hypoxia and hypercapnia due   to surgery  -- Other - I will add my own diagnosis  -- Disagree - Not applicable / Not valid  -- Disagree - Clinically unable to determine / Unknown  -- Refer to Clinical Documentation Reviewer    PROVIDER RESPONSE TEXT:    This patient is in acute respiratory failure with hypoxia and hypercapnia not   related to surgery.    Query created by: Meredith Martin on 2024 10:06 AM      Electronically signed by:  Marleny Pritchard CNP 2024 11:37 AM

## 2024-07-29 ENCOUNTER — OFFICE VISIT (OUTPATIENT)
Dept: ENT CLINIC | Age: 89
End: 2024-07-29
Payer: MEDICARE

## 2024-07-29 VITALS
TEMPERATURE: 97.9 F | HEART RATE: 82 BPM | WEIGHT: 149.4 LBS | SYSTOLIC BLOOD PRESSURE: 136 MMHG | DIASTOLIC BLOOD PRESSURE: 72 MMHG | BODY MASS INDEX: 22.64 KG/M2 | OXYGEN SATURATION: 94 % | HEIGHT: 68 IN

## 2024-07-29 DIAGNOSIS — J38.02 BILATERAL VOCAL CORD PARESIS: ICD-10-CM

## 2024-07-29 DIAGNOSIS — J38.7 PRESBYLARYNGES: ICD-10-CM

## 2024-07-29 DIAGNOSIS — J38.6 GLOTTIC STENOSIS: Primary | ICD-10-CM

## 2024-07-29 DIAGNOSIS — J38.7 CRICOARYTENOID JOINT FIXATION: ICD-10-CM

## 2024-07-29 PROCEDURE — 99024 POSTOP FOLLOW-UP VISIT: CPT | Performed by: OTOLARYNGOLOGY

## 2024-07-29 PROCEDURE — 31575 DIAGNOSTIC LARYNGOSCOPY: CPT | Performed by: OTOLARYNGOLOGY

## 2024-08-16 PROBLEM — Z98.890 POST-OPERATIVE STATE: Status: RESOLVED | Noted: 2024-07-17 | Resolved: 2024-08-16

## 2024-12-05 ENCOUNTER — TELEPHONE (OUTPATIENT)
Dept: ENT CLINIC | Age: 89
End: 2024-12-05

## 2024-12-05 NOTE — TELEPHONE ENCOUNTER
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 Last procedure in July with Dr. Duenas for laryngoscopy with arytenoidectomy, laryngoplasty with flap with recommendations for 3 month follow up incase further surgical care is needed.    Will defer time frame recommendations to Dr. Duenas

## 2024-12-05 NOTE — TELEPHONE ENCOUNTER
Patient's wife called our office today to reschedule his post op appointment from 11/26/2024. This appointment was cancelled because the patient was hospitalized in [place]. Patient had a previous appointment that was scheduled for 10/16/2024 which was rescheduled by the patient because he was not feeling well. Wife state's patient's breathing is better but his coughing is not. However, in regards to the patient's breathing noises while sleeping, there has been improvement.     Please advise when this patient needs to be seen by and if he is able to seen any other provider in the office or not.

## 2024-12-06 NOTE — TELEPHONE ENCOUNTER
Please schedule for next available appointment and any [facility] schedule or my schedule, whichever soonest.  If he is not on my schedule please have pharmacy send perform a flexible endoscopy so that I can review it.  Thank you  PFC

## 2024-12-06 NOTE — TELEPHONE ENCOUNTER
Called patient to schedule and he asked if I could call back in a hour or so when his wife is back.

## 2024-12-06 NOTE — TELEPHONE ENCOUNTER
Per Dr. Henrique mac for next available [facility] or him.     Bexarotene Pregnancy And Lactation Text: This medication is Pregnancy Category X and should not be given to women who are pregnant or may become pregnant. This medication should not be used if you are breast feeding.

## 2024-12-09 NOTE — TELEPHONE ENCOUNTER
Appointment made on 1/28 at 8:40am with Marleny for possible laryngoscopy per Dr. Duenas.    Confirmed with his wife

## 2025-01-28 ENCOUNTER — OFFICE VISIT (OUTPATIENT)
Dept: ENT CLINIC | Age: 89
End: 2025-01-28
Payer: MEDICARE

## 2025-01-28 VITALS
HEART RATE: 75 BPM | RESPIRATION RATE: 18 BRPM | HEIGHT: 70 IN | OXYGEN SATURATION: 97 % | SYSTOLIC BLOOD PRESSURE: 138 MMHG | TEMPERATURE: 97.2 F | WEIGHT: 155.3 LBS | BODY MASS INDEX: 22.23 KG/M2 | DIASTOLIC BLOOD PRESSURE: 70 MMHG

## 2025-01-28 DIAGNOSIS — J38.02 BILATERAL VOCAL CORD PARESIS: Primary | ICD-10-CM

## 2025-01-28 DIAGNOSIS — J38.7 PRESBYLARYNGES: ICD-10-CM

## 2025-01-28 PROCEDURE — G8420 CALC BMI NORM PARAMETERS: HCPCS | Performed by: OTOLARYNGOLOGY

## 2025-01-28 PROCEDURE — G8427 DOCREV CUR MEDS BY ELIG CLIN: HCPCS | Performed by: OTOLARYNGOLOGY

## 2025-01-28 PROCEDURE — 99213 OFFICE O/P EST LOW 20 MIN: CPT | Performed by: OTOLARYNGOLOGY

## 2025-01-28 PROCEDURE — 1159F MED LIST DOCD IN RCRD: CPT | Performed by: OTOLARYNGOLOGY

## 2025-01-28 PROCEDURE — 31575 DIAGNOSTIC LARYNGOSCOPY: CPT | Performed by: OTOLARYNGOLOGY

## 2025-01-28 PROCEDURE — 1036F TOBACCO NON-USER: CPT | Performed by: OTOLARYNGOLOGY

## 2025-01-28 PROCEDURE — 1123F ACP DISCUSS/DSCN MKR DOCD: CPT | Performed by: OTOLARYNGOLOGY

## 2025-01-28 RX ORDER — LOSARTAN POTASSIUM 25 MG/1
25 TABLET ORAL DAILY
COMMUNITY

## 2025-02-04 NOTE — PROGRESS NOTES
[facility] [place] [facility]  [facility]SE [facility]  [address]  [address]  [facility] [address]  Dept: [phone]  Dept Fax: [phone]  Loc: [phone]    Franki Thao is a 89 y.o. male who was referred by No ref. provider found for:  Chief Complaint   Patient presents with    Other     Possible laryngoscopy.   Patient reports that he is doing pretty well.  He reports having a dry spot in his throat that tickles.   Wife reports that he is breathing better.         HPI:       History of Present Illness  Franki Thao is a 89 y.o. male who presents for a follow-up visit after throat surgery. He is accompanied by his family.    He underwent a surgical procedure on his throat in 07/2023, following a period of intensive care due to paralysis and migration of the affected area to the contralateral side. Postoperatively, he has been faring well, with an improvement in his respiratory function. He reports experiencing a dry, tickling sensation in his throat, which occasionally triggers a cough reflex. His sleep quality has also improved, with no reported disturbances. He has been breathing better since the surgery. He was noted to have stridor during his last visit. He was noted to have slept until noon one morning.    Supplemental Information  He has been taking nitroglycerin tablets 3 to 4 times a day.    MEDICATIONS  Nitroglycerin tablets.        History:     Allergies   Allergen Reactions    Gemfibrozil Other (See Comments)     Unable to walk    Other Reaction(s): Muscle Cramp/Pain, Myalgia, Unknown    Simvastatin Other (See Comments)     Unable to walk    Other Reaction(s): Muscle Cramp/Pain, Myalgia, Unknown    Vancomycin Anaphylaxis    Amoxicillin      Other Reaction(s): Rash    Reports not having any problems with this medication, not sure why listed    Flomax [Tamsulosin Hcl] Other (See Comments)     dizziness    Morphine Nausea And Vomiting     Other Reaction(s): Unknown

## 2025-05-22 NOTE — PROGRESS NOTES
1546- pt to pacu, stridor audible, Dr. Duenas at bedside, VSS. Pt placed on high flow nasal cannula, pt opens eyes to name, BiPAP and ABG order received  1558- pt placed on BiPAP  1602- racepinephrine given  1610- pressures increased on BiPAP, pt remains not responding verbally, hearing aids placed in bilateral ears, pt opens eyes to name  1615- ABGs drawn  1620- chest xray at bedside, reviewed by Dr. Duenas  1622- CO2 75.8, Dr. Duenas aware  1630- hydralazine given  1635- Dr. Duenas okay with patient moving to floor  1638- report called to Yamilet LOJA on 4B  1645- pt alert in bed, talking with this RN, pt pulling at BiPAP mask, pt educated on situations, pt covered with warm blanket  1650- pt calm in bed, pt resting in bed with eyes opened, resp easy and unlabored, VSS, pt appears in no acute distress  1700- pt remains awake, alert and stable in bed, stridor remain audible but improved, VSS, pt appears in no acute distress  1715- pt transported to floor in stable condition           22-May-2025 13:55

## (undated) DEVICE — FORCEP RAD JAW W/NEEDLE 160CM

## (undated) DEVICE — SUTURE PERMA-HAND SZ 3-0 L30IN NONABSORBABLE BLK L60MM KS 622H

## (undated) DEVICE — DISPOSABLE STANDARD BIPOLAR FORCEPS, NON-STICK,: Brand: SPETZLER-MALIS

## (undated) DEVICE — STRIP,CLOSURE,WOUND,MEDI-STRIP,1/2X4: Brand: MEDLINE

## (undated) DEVICE — TUBING IV STOPCOCK 48 CM 3 W

## (undated) DEVICE — BLADE LARYNSCP SZ 3 ENH DIR INTUB GLIDESCOPE MCGRATH MAC

## (undated) DEVICE — CATHETER ETER IV 22GA L1IN POLYUR STR RADPQ INTROCAN SFTY

## (undated) DEVICE — Z DISCONTINUED APPLICATOR SURG PREP 0.35OZ 2% CHG 70% ISO ALC W/ HI LT

## (undated) DEVICE — GOWN,SIRUS,NON REINFRCD,LARGE,SET IN SL: Brand: MEDLINE

## (undated) DEVICE — ENDO KIT: Brand: MEDLINE INDUSTRIES, INC.

## (undated) DEVICE — CARBIDE MATCH HEAD

## (undated) DEVICE — SPLINT ARMBRD W3XL10.5IN POLYFOAM DLX A LN

## (undated) DEVICE — SET LNR RED GRN W/ BASE CLEANASCOPE

## (undated) DEVICE — IV START KIT: Brand: MEDLINE INDUSTRIES, INC.

## (undated) DEVICE — CLIP LIG M TI 6 SIL HNDL FOR OPN AND ENDOSCP SGL APPL

## (undated) DEVICE — PACK-MAJOR

## (undated) DEVICE — SUTURE NRLN SZ 0 L18IN NONABSORBABLE BLK L22MM MO-7 1/2 CIR C541D

## (undated) DEVICE — GLOVE ORANGE PI 7 1/2   MSG9075

## (undated) DEVICE — Z DISCONTINUED BY MEDLINE USE 2711682 TRAY SKIN PREP DRY W/ PREM GLV

## (undated) DEVICE — GLOVE SURG SZ 85 L12IN THK75MIL DK GRN LTX FREE

## (undated) DEVICE — SPONGE GZ W4XL4IN COT 12 PLY TYP VII WVN C FLD DSGN

## (undated) DEVICE — GLOVE ORANGE PI 8   MSG9080

## (undated) DEVICE — GAUZE,SPONGE,4"X4",12PLY,STERILE,LF,2'S: Brand: MEDLINE

## (undated) DEVICE — ROYAL SILK SURGICAL GOWN, XXL: Brand: CONVERTORS

## (undated) DEVICE — SOLUTION IV IRRIG POUR BRL 0.9% SODIUM CHL 2F7124

## (undated) DEVICE — CODMAN® SURGICAL PATTIES 1/2" X 1/2" (1.27CM X 1.27CM): Brand: CODMAN®

## (undated) DEVICE — GLOVE ORANGE PI 7   MSG9070

## (undated) DEVICE — SUTURE MONOCRYL SZ 4-0 L18IN ABSRB VLT P-3 L13MM 3/8 CIR REV Y464G

## (undated) DEVICE — LARYNGOSCOPY - BRONCHOSCOPY: Brand: MEDLINE INDUSTRIES, INC.

## (undated) DEVICE — TUBING, SUCTION, 1/4" X 20', STRAIGHT: Brand: MEDLINE INDUSTRIES, INC.

## (undated) DEVICE — COVER ARMBRD W13XL28.5IN IMPERV BLU FOR OP RM

## (undated) DEVICE — SOLUTION IV 500ML 0.9% SOD CHL PH 5 INJ USP VIAFLX PLAS

## (undated) DEVICE — GLOVE SURG SZ 75 L12IN FNGR THK94MIL STD WHT ISOLEX LTX

## (undated) DEVICE — FLOSEAL MATRIX IS INDICATED IN SURGICAL PROCEDURES (OTHER THAN IN OPHTHALMIC) AS AN ADJUNCT TO HEMOSTASIS WHEN CONTROL OF BLEEDING BY LIGATURE OR CONVENTIONAL PROCEDURES IS INEFFECTIVE OR IMPRACTICAL.: Brand: FLOSEAL HEMOSTATIC MATRIX

## (undated) DEVICE — SOLUTION IV 1000ML 0.45% SOD CHL PH 5 INJ USP VIAFLX PLAS

## (undated) DEVICE — PAD,NON-ADHERENT,3X8,STERILE,LF,1/PK: Brand: MEDLINE

## (undated) DEVICE — NEEDLE SPNL L3.5IN PNK HUB S STL REG WALL FIT STYL W/ QNCKE

## (undated) DEVICE — CONMED SCOPE SAVER BITE BLOCK, 20X27 MM: Brand: SCOPE SAVER

## (undated) DEVICE — GOWN,SIRUS,NONRNF,SETINSLV,XL,20/CS: Brand: MEDLINE

## (undated) DEVICE — CONNECTOR TBNG AUX H2O JET DISP FOR OLY 160/180 SER

## (undated) DEVICE — Device

## (undated) DEVICE — SET CATH 20GA L1.5IN RAD ART POLYUR RADPQ W/ INTEGR 0.018IN

## (undated) DEVICE — GLOVE ORANGE PI 8 1/2   MSG9085

## (undated) DEVICE — UNIVERSAL MONOPOLAR LAPAROSCOPIC CABLE 10FT, 4MM PIN CONNECTOR: Brand: CONMED

## (undated) DEVICE — 4-PORT MANIFOLD: Brand: NEPTUNE 2

## (undated) DEVICE — LINER SUCT CANSTR 1500CC SEMI RIG W/ POR HYDROPHOBIC SHUT

## (undated) DEVICE — SUTURE VCRL SZ 3-0 L18IN ABSRB VLT L26MM SH 1/2 CIR J774D

## (undated) DEVICE — TUBING PRSS 36 M F

## (undated) DEVICE — SET ADMIN 25ML L117IN PMP MOD CK VLV RLER CLMP 2 SMRTSITE

## (undated) DEVICE — SUTURE VCRL SZ 0 L36IN ABSRB VLT L36MM CT-1 1/2 CIR J346H

## (undated) DEVICE — AGENT HEMSTAT W2XL4IN OXIDIZED REGENERATED CELOS ABSRB SFT

## (undated) DEVICE — SUTURE VCRL SZ 1 L18IN ABSRB VLT CTX L48MM 1/2 CIR J765D

## (undated) DEVICE — SPONGE,PEANUT,XRAY,ST,SM,3/8",5/CARD: Brand: MEDLINE INDUSTRIES, INC.

## (undated) DEVICE — 3M™ STERI-STRIP™ COMPOUND BENZOIN TINCTURE 40 BAGS/CARTON 4 CARTONS/CASE C1544: Brand: 3M™ STERI-STRIP™

## (undated) DEVICE — TOTAL TRAY, DB, 100% SILI FOLEY, 16FR 10: Brand: MEDLINE

## (undated) DEVICE — 1010 S-DRAPE TOWEL DRAPE 10/BX: Brand: STERI-DRAPE™

## (undated) DEVICE — 3M™ BAIR HUGGER® MULTI ACCESS BLANKET, PEDIATRIC, FULL BODY, 10 PER CASE 31000: Brand: BAIR HUGGER™

## (undated) DEVICE — E-Z CLEAN, NON-STICK, PTFE COATED, ELECTROSURGICAL BLADE ELECTRODE, MODIFIED EXTENDED INSULATION, 2.5 INCH (6.35 CM): Brand: MEGADYNE

## (undated) DEVICE — SET CATH 20GA L1.75IN RAD ART POLYUR RADPQ W/ INTEGR

## (undated) DEVICE — AEGIS 1" DISK 4MM HOLE, PEEL OPEN: Brand: MEDLINE

## (undated) DEVICE — GLOVE SURG SZ 65 THK91MIL LTX FREE SYN POLYISOPRENE

## (undated) DEVICE — INTENDED FOR TISSUE SEPARATION, AND OTHER PROCEDURES THAT REQUIRE A SHARP SURGICAL BLADE TO PUNCTURE OR CUT.: Brand: BARD-PARKER ® CARBON RIB-BACK BLADES

## (undated) DEVICE — GLOVE SURG SZ 7.5 L11.73IN FNGR THK9.8MIL STRW LTX POLYMER

## (undated) DEVICE — PRESSURE MONITORING SET: Brand: TRUWAVE

## (undated) DEVICE — CODMAN® SURGICAL PATTIES 1/2" X1 1/2" (1.27CM X 3.81CM): Brand: CODMAN®

## (undated) DEVICE — YANKAUER,BULB TIP,W/O VENT,RIGID,STERILE: Brand: MEDLINE

## (undated) DEVICE — TAPE,CLOTH/SILK,CURAD,3"X10YD,LF,40/CS: Brand: CURAD